# Patient Record
Sex: MALE | Race: WHITE | Employment: UNEMPLOYED | URBAN - METROPOLITAN AREA
[De-identification: names, ages, dates, MRNs, and addresses within clinical notes are randomized per-mention and may not be internally consistent; named-entity substitution may affect disease eponyms.]

---

## 2018-09-07 ENCOUNTER — OFFICE VISIT (OUTPATIENT)
Dept: FAMILY MEDICINE CLINIC | Age: 48
End: 2018-09-07

## 2018-09-07 VITALS
BODY MASS INDEX: 28.23 KG/M2 | SYSTOLIC BLOOD PRESSURE: 132 MMHG | WEIGHT: 208.4 LBS | RESPIRATION RATE: 16 BRPM | OXYGEN SATURATION: 98 % | HEIGHT: 72 IN | HEART RATE: 75 BPM | DIASTOLIC BLOOD PRESSURE: 82 MMHG

## 2018-09-07 DIAGNOSIS — F41.9 ANXIETY: ICD-10-CM

## 2018-09-07 DIAGNOSIS — J34.1 MUCOUS RETENTION CYST OF MAXILLARY SINUS: ICD-10-CM

## 2018-09-07 DIAGNOSIS — J32.0 CHRONIC MAXILLARY SINUSITIS: ICD-10-CM

## 2018-09-07 DIAGNOSIS — I10 ESSENTIAL HYPERTENSION: Primary | ICD-10-CM

## 2018-09-07 PROCEDURE — 99204 OFFICE O/P NEW MOD 45 MIN: CPT | Performed by: NURSE PRACTITIONER

## 2018-09-07 RX ORDER — LORAZEPAM 0.5 MG/1
0.5 TABLET ORAL 2 TIMES DAILY PRN
Qty: 30 TABLET | Refills: 0 | Status: SHIPPED | OUTPATIENT
Start: 2018-09-07 | End: 2018-10-03 | Stop reason: SDUPTHER

## 2018-09-07 RX ORDER — FLUTICASONE PROPIONATE 50 MCG
2 SPRAY, SUSPENSION (ML) NASAL DAILY
Qty: 1 BOTTLE | Refills: 3 | Status: SHIPPED | OUTPATIENT
Start: 2018-09-07 | End: 2019-09-09 | Stop reason: ALTCHOICE

## 2018-09-07 RX ORDER — PREDNISONE 10 MG/1
TABLET ORAL
Qty: 20 TABLET | Refills: 0 | Status: SHIPPED | OUTPATIENT
Start: 2018-09-07 | End: 2018-10-03 | Stop reason: ALTCHOICE

## 2018-09-07 RX ORDER — LISINOPRIL 20 MG/1
20 TABLET ORAL DAILY
Qty: 30 TABLET | Refills: 3 | Status: SHIPPED | OUTPATIENT
Start: 2018-09-07 | End: 2019-01-07 | Stop reason: SDUPTHER

## 2018-09-07 RX ORDER — CLARITHROMYCIN 500 MG/1
500 TABLET, COATED ORAL 2 TIMES DAILY
Qty: 28 TABLET | Refills: 0 | Status: SHIPPED | OUTPATIENT
Start: 2018-09-07 | End: 2018-09-21

## 2018-09-07 ASSESSMENT — ENCOUNTER SYMPTOMS
NAUSEA: 0
COUGH: 1
SPUTUM PRODUCTION: 1
SORE THROAT: 1
EYE PAIN: 0
SINUS PAIN: 1
BLURRED VISION: 0
WHEEZING: 0
EYE DISCHARGE: 0
ABDOMINAL PAIN: 0
SHORTNESS OF BREATH: 0
DIARRHEA: 0

## 2018-09-07 ASSESSMENT — PATIENT HEALTH QUESTIONNAIRE - PHQ9
SUM OF ALL RESPONSES TO PHQ QUESTIONS 1-9: 0
SUM OF ALL RESPONSES TO PHQ QUESTIONS 1-9: 0
1. LITTLE INTEREST OR PLEASURE IN DOING THINGS: 0
SUM OF ALL RESPONSES TO PHQ9 QUESTIONS 1 & 2: 0
2. FEELING DOWN, DEPRESSED OR HOPELESS: 0

## 2018-09-07 NOTE — PROGRESS NOTES
Xander Mancilla  56 y.o. male    1970      CC: ER follow up     Chief Complaint   Patient presents with    Follow-Up from [address] PALPITATIONS/ PANIC ATTACKS AND THIS CAUSES HIGH BP. TOLD HIM AT ER COULD NOT GIVE HIM ANYTHING AND NEEDED A PCP. HPI     HAS DEEP ROOTED SINUS ISSUES THAT \"MESSES WITH HIS HEAD\"  Has had 3 anxiety attacks this summer. Had a shot of ativan in the ER and that helped. He was not given anything to go home on except for antibiotics for the sinus infection. Had an attack in June - Had blood pressure medication and vertigo med in June. Then head started same thing, and then got 10 day antibiotic. Still has head pressure - shifted to the side. Thinks has chronic sinus infection. He hates going to the doctor. He was forced to go to the doctor with these episodes. This started with being turned down from physical for BP. That was the start of the first one in June. BP was 170/108 and was failed. Could not get out of his own head. Then had feeling like heart was coming out of his chest.  Takes 2 meds for BP - Has been taking the BP meds and does well other than when has these attacks. Was deer hunting when the last one came on. Was feeling hot and heart pounded and turned air on and that did not help. Then calmed down for a little while and still did not help. Then when he got there - relaxed and felt better. They gave shot of ativan, and calms him quickly. He is a smoker   Has given up pop  Has been limiting coffee to 1 cup daily  Cigarettes - Has been reducing some. Was 1 ppd and quit for 6 years and restart in 2014 and now 1/2 ppd. Would like to quit. Allergies - uses claritin and claritin D. Sudafed. Flonase. Review of Systems   Constitutional: Negative. HENT: Positive for congestion, sinus pain and sore throat. Eyes: Negative for blurred vision, pain and discharge.    Respiratory: reviewed. Vitals:    09/07/18 0936   BP: 132/82   Site: Right Upper Arm   Position: Sitting   Cuff Size: Large Adult   Pulse: 75   Resp: 16   SpO2: 98%   Weight: 208 lb 6.4 oz (94.5 kg)   Height: 6' (1.829 m)     Body mass index is 28.26 kg/m². Wt Readings from Last 3 Encounters:   09/07/18 208 lb 6.4 oz (94.5 kg)   09/02/18 207 lb 14.3 oz (94.3 kg)   06/13/18 211 lb 6.7 oz (95.9 kg)     BP Readings from Last 3 Encounters:   09/07/18 132/82   09/03/18 127/72   08/25/18 127/76        No results found for this visit on 09/07/18. Assessment     Diagnosis Orders   1. Essential hypertension  metoprolol tartrate (LOPRESSOR) 25 MG tablet    lisinopril (PRINIVIL) 20 MG tablet   2. Chronic maxillary sinusitis  fluticasone (FLONASE) 50 MCG/ACT nasal spray    clarithromycin (BIAXIN) 500 MG tablet   3. Anxiety  LORazepam (ATIVAN) 0.5 MG tablet   4. Mucous retention cyst of maxillary sinus           Plan    Refilled metoprolol and lisinopril for blood pressure  Given information on DASH diet  Has chronic maxillary sinusitis with a mucous retention cyst that sparingly large on CT  Given Flonase, Biaxin, and prednisone in an attempt to treat  May need ENT follow-up  Given Ativan for the short run for anxiety - this is acute anxiety  Discussed if becomes more chronic may need a controller medication. Controlled Substances Monitoring:     RX Monitoring 9/7/2018   Attestation The Prescription Monitoring Report for this patient was reviewed today. Documentation No signs of potential drug abuse or diversion identified. Return in about 3 months (around 12/7/2018) for Anxiety, Hypertension. Patient Instructions     Patient Education        DASH Diet: Care Instructions  Your Care Instructions    The DASH diet is an eating plan that can help lower your blood pressure. DASH stands for Dietary Approaches to Stop Hypertension. Hypertension is high blood pressure.   The DASH diet focuses on eating foods that are high in calcium, potassium, and magnesium. These nutrients can lower blood pressure. The foods that are highest in these nutrients are fruits, vegetables, low-fat dairy products, nuts, seeds, and legumes. But taking calcium, potassium, and magnesium supplements instead of eating foods that are high in those nutrients does not have the same effect. The DASH diet also includes whole grains, fish, and poultry. The DASH diet is one of several lifestyle changes your doctor may recommend to lower your high blood pressure. Your doctor may also want you to decrease the amount of sodium in your diet. Lowering sodium while following the DASH diet can lower blood pressure even further than just the DASH diet alone. Follow-up care is a key part of your treatment and safety. Be sure to make and go to all appointments, and call your doctor if you are having problems. It's also a good idea to know your test results and keep a list of the medicines you take. How can you care for yourself at home? Following the DASH diet  · Eat 4 to 5 servings of fruit each day. A serving is 1 medium-sized piece of fruit, ½ cup chopped or canned fruit, 1/4 cup dried fruit, or 4 ounces (½ cup) of fruit juice. Choose fruit more often than fruit juice. · Eat 4 to 5 servings of vegetables each day. A serving is 1 cup of lettuce or raw leafy vegetables, ½ cup of chopped or cooked vegetables, or 4 ounces (½ cup) of vegetable juice. Choose vegetables more often than vegetable juice. · Get 2 to 3 servings of low-fat and fat-free dairy each day. A serving is 8 ounces of milk, 1 cup of yogurt, or 1 ½ ounces of cheese. · Eat 6 to 8 servings of grains each day. A serving is 1 slice of bread, 1 ounce of dry cereal, or ½ cup of cooked rice, pasta, or cooked cereal. Try to choose whole-grain products as much as possible. · Limit lean meat, poultry, and fish to 2 servings each day. A serving is 3 ounces, about the size of a deck of cards.   · Eat 4 to 5 servings about \"DASH Diet: Care Instructions. \"     If you do not have an account, please click on the \"Sign Up Now\" link. Current as of: December 6, 2017  Content Version: 11.7  © 6120-3660 [employer]. Care instructions adapted under license by [facility] ([facility]). If you have questions about a medical condition or this instruction, always ask your healthcare professional. [address] any warranty or liability for your use of this information.

## 2018-10-03 ENCOUNTER — OFFICE VISIT (OUTPATIENT)
Dept: FAMILY MEDICINE CLINIC | Age: 48
End: 2018-10-03

## 2018-10-03 VITALS
DIASTOLIC BLOOD PRESSURE: 88 MMHG | RESPIRATION RATE: 16 BRPM | SYSTOLIC BLOOD PRESSURE: 132 MMHG | HEIGHT: 72 IN | WEIGHT: 212 LBS | BODY MASS INDEX: 28.71 KG/M2 | HEART RATE: 85 BPM | OXYGEN SATURATION: 98 %

## 2018-10-03 DIAGNOSIS — F41.9 ANXIETY: ICD-10-CM

## 2018-10-03 DIAGNOSIS — H65.23 BILATERAL CHRONIC SEROUS OTITIS MEDIA: Primary | ICD-10-CM

## 2018-10-03 DIAGNOSIS — H83.03 ACUTE LABYRINTHITIS, BILATERAL: ICD-10-CM

## 2018-10-03 PROCEDURE — 99214 OFFICE O/P EST MOD 30 MIN: CPT | Performed by: NURSE PRACTITIONER

## 2018-10-03 RX ORDER — PREDNISONE 10 MG/1
TABLET ORAL
Qty: 34 TABLET | Refills: 0 | Status: SHIPPED | OUTPATIENT
Start: 2018-10-03 | End: 2019-02-20 | Stop reason: ALTCHOICE

## 2018-10-03 RX ORDER — MECLIZINE HYDROCHLORIDE 25 MG/1
25 TABLET ORAL 3 TIMES DAILY PRN
Qty: 30 TABLET | Refills: 1 | Status: SHIPPED | OUTPATIENT
Start: 2018-10-03 | End: 2018-10-13

## 2018-10-03 RX ORDER — LORAZEPAM 0.5 MG/1
0.5 TABLET ORAL 2 TIMES DAILY PRN
Qty: 30 TABLET | Refills: 1 | Status: SHIPPED | OUTPATIENT
Start: 2018-10-03 | End: 2018-11-02

## 2018-10-03 NOTE — PROGRESS NOTES
(DELTASONE) 10 MG tablet    meclizine (ANTIVERT) 25 MG tablet    Aleks Mckay MD   3. Anxiety  LORazepam (ATIVAN) 0.5 MG tablet         Plan    High dose pred taper to try and treat  antivert to treat  Epley maneuvers given  Refer to Dr Christy Campbell if not improving. Ativan for anxiety. Refilled    Return if symptoms worsen or fail to improve. There are no Patient Instructions on file for this visit. Controlled Substances Monitoring:     RX Monitoring 10/3/2018   Attestation The Prescription Monitoring Report for this patient was reviewed today. Documentation No signs of potential drug abuse or diversion identified.

## 2019-01-07 DIAGNOSIS — I10 ESSENTIAL HYPERTENSION: ICD-10-CM

## 2019-01-07 RX ORDER — LISINOPRIL 20 MG/1
TABLET ORAL
Qty: 30 TABLET | Refills: 0 | Status: SHIPPED | OUTPATIENT
Start: 2019-01-07 | End: 2019-03-13 | Stop reason: SDUPTHER

## 2019-02-20 ENCOUNTER — OFFICE VISIT (OUTPATIENT)
Dept: FAMILY MEDICINE CLINIC | Age: 49
End: 2019-02-20

## 2019-02-20 VITALS
HEIGHT: 72 IN | SYSTOLIC BLOOD PRESSURE: 132 MMHG | TEMPERATURE: 98.8 F | OXYGEN SATURATION: 98 % | BODY MASS INDEX: 29.42 KG/M2 | HEART RATE: 82 BPM | DIASTOLIC BLOOD PRESSURE: 66 MMHG | WEIGHT: 217.2 LBS

## 2019-02-20 DIAGNOSIS — J32.9 CHRONIC SINUSITIS, UNSPECIFIED LOCATION: Primary | ICD-10-CM

## 2019-02-20 DIAGNOSIS — R42 DIZZINESS: ICD-10-CM

## 2019-02-20 PROCEDURE — 99213 OFFICE O/P EST LOW 20 MIN: CPT | Performed by: FAMILY MEDICINE

## 2019-02-20 RX ORDER — CEFDINIR 300 MG/1
300 CAPSULE ORAL 2 TIMES DAILY
Qty: 20 CAPSULE | Refills: 0 | Status: SHIPPED | OUTPATIENT
Start: 2019-02-20 | End: 2019-03-02

## 2019-02-20 RX ORDER — PREDNISONE 20 MG/1
TABLET ORAL
Qty: 24 TABLET | Refills: 0 | Status: SHIPPED | OUTPATIENT
Start: 2019-02-20 | End: 2019-03-04 | Stop reason: ALTCHOICE

## 2019-02-20 ASSESSMENT — ENCOUNTER SYMPTOMS
EYE REDNESS: 0
COUGH: 0
SINUS PRESSURE: 1
ABDOMINAL PAIN: 0
RHINORRHEA: 0
DIARRHEA: 0
SORE THROAT: 0
SHORTNESS OF BREATH: 0
EYE PAIN: 0
VOMITING: 0
WHEEZING: 0

## 2019-03-04 ENCOUNTER — OFFICE VISIT (OUTPATIENT)
Dept: ENT CLINIC | Age: 49
End: 2019-03-04

## 2019-03-04 VITALS — HEART RATE: 69 BPM | OXYGEN SATURATION: 98 % | SYSTOLIC BLOOD PRESSURE: 132 MMHG | DIASTOLIC BLOOD PRESSURE: 80 MMHG

## 2019-03-04 DIAGNOSIS — R42 DIZZY: Primary | ICD-10-CM

## 2019-03-04 PROCEDURE — 99203 OFFICE O/P NEW LOW 30 MIN: CPT | Performed by: OTOLARYNGOLOGY

## 2019-03-04 ASSESSMENT — ENCOUNTER SYMPTOMS
SINUS PAIN: 0
FACIAL SWELLING: 0
TROUBLE SWALLOWING: 0
RHINORRHEA: 0
ALLERGIC/IMMUNOLOGIC NEGATIVE: 1
SORE THROAT: 0
SINUS PRESSURE: 0
RESPIRATORY NEGATIVE: 1
EYES NEGATIVE: 1
VOICE CHANGE: 0

## 2019-03-13 DIAGNOSIS — I10 ESSENTIAL HYPERTENSION: ICD-10-CM

## 2019-03-13 RX ORDER — LISINOPRIL 20 MG/1
20 TABLET ORAL DAILY
Qty: 30 TABLET | Refills: 2 | Status: SHIPPED | OUTPATIENT
Start: 2019-03-13 | End: 2019-06-10 | Stop reason: DRUGHIGH

## 2019-03-19 ENCOUNTER — PROCEDURE VISIT (OUTPATIENT)
Dept: AUDIOLOGY | Age: 49
End: 2019-03-19

## 2019-03-19 DIAGNOSIS — H83.2X3 VESTIBULAR DYSFUNCTION, BILATERAL: Primary | ICD-10-CM

## 2019-03-19 DIAGNOSIS — H93.13 TINNITUS OF BOTH EARS: Primary | ICD-10-CM

## 2019-03-19 PROCEDURE — 92550 TYMPANOMETRY & REFLEX THRESH: CPT | Performed by: AUDIOLOGIST

## 2019-03-19 PROCEDURE — 92537 CALORIC VSTBLR TEST W/REC: CPT | Performed by: AUDIOLOGIST

## 2019-03-19 PROCEDURE — 92540 BASIC VESTIBULAR EVALUATION: CPT | Performed by: AUDIOLOGIST

## 2019-03-19 PROCEDURE — 92557 COMPREHENSIVE HEARING TEST: CPT | Performed by: AUDIOLOGIST

## 2019-03-22 ENCOUNTER — TELEPHONE (OUTPATIENT)
Dept: ENT CLINIC | Age: 49
End: 2019-03-22

## 2019-03-22 DIAGNOSIS — N28.9 KIDNEY DISEASE: ICD-10-CM

## 2019-03-22 DIAGNOSIS — H90.5 HEARING LOSS, SENSORINEURAL, UNILATERAL: Primary | ICD-10-CM

## 2019-03-25 ENCOUNTER — TELEPHONE (OUTPATIENT)
Dept: FAMILY MEDICINE CLINIC | Age: 49
End: 2019-03-25

## 2019-03-25 DIAGNOSIS — F41.9 ANXIETY: ICD-10-CM

## 2019-03-25 NOTE — TELEPHONE ENCOUNTER
LAST VISIT 10/03/2018 WITH JOSEFINA, NEXT VISIT TOMORROW WITH DR Jodee Rojas. DR ROPER IS OUT OF THE OFFICE. OARRS IN MEDIA.   [facility]

## 2019-03-25 NOTE — TELEPHONE ENCOUNTER
Pt is calling to get a refill on his Lorazapam .5 mg he had #30. Myna Pellet is who filled it at that time but he now sees Dr Malena Corado. Pt is out of his meds.

## 2019-03-26 ENCOUNTER — OFFICE VISIT (OUTPATIENT)
Dept: FAMILY MEDICINE CLINIC | Age: 49
End: 2019-03-26

## 2019-03-26 VITALS
HEIGHT: 72 IN | RESPIRATION RATE: 20 BRPM | HEART RATE: 95 BPM | OXYGEN SATURATION: 97 % | WEIGHT: 224.2 LBS | DIASTOLIC BLOOD PRESSURE: 84 MMHG | SYSTOLIC BLOOD PRESSURE: 146 MMHG | BODY MASS INDEX: 30.37 KG/M2

## 2019-03-26 DIAGNOSIS — Z72.0 TOBACCO ABUSE: ICD-10-CM

## 2019-03-26 DIAGNOSIS — F41.9 ANXIETY: Primary | ICD-10-CM

## 2019-03-26 DIAGNOSIS — I10 ESSENTIAL HYPERTENSION: Chronic | ICD-10-CM

## 2019-03-26 LAB
AMPHETAMINE SCREEN, URINE: NEGATIVE
BARBITURATE SCREEN, URINE: NEGATIVE
BENZODIAZEPINE SCREEN, URINE: NEGATIVE
BUPRENORPHINE URINE: NEGATIVE
COCAINE METABOLITE SCREEN URINE: NEGATIVE
GABAPENTIN SCREEN, URINE: NEGATIVE
MDMA URINE: NEGATIVE
METHADONE SCREEN, URINE: NEGATIVE
METHAMPHETAMINE, URINE: NEGATIVE
OPIATE SCREEN URINE: NEGATIVE
OXYCODONE SCREEN URINE: NEGATIVE
PHENCYCLIDINE SCREEN URINE: NEGATIVE
PROPOXYPHENE SCREEN, URINE: NEGATIVE
THC SCREEN, URINE: NEGATIVE
TRICYCLIC ANTIDEPRESSANTS, UR: NEGATIVE

## 2019-03-26 PROCEDURE — 99212 OFFICE O/P EST SF 10 MIN: CPT | Performed by: FAMILY MEDICINE

## 2019-03-26 PROCEDURE — 80305 DRUG TEST PRSMV DIR OPT OBS: CPT | Performed by: FAMILY MEDICINE

## 2019-03-26 RX ORDER — LORAZEPAM 0.5 MG/1
0.5 TABLET ORAL 2 TIMES DAILY PRN
Qty: 30 TABLET | Refills: 0 | OUTPATIENT
Start: 2019-03-26 | End: 2019-04-25

## 2019-03-26 RX ORDER — LORAZEPAM 0.5 MG/1
0.5 TABLET ORAL 2 TIMES DAILY PRN
COMMUNITY
End: 2019-03-26 | Stop reason: SDUPTHER

## 2019-03-26 RX ORDER — LORAZEPAM 0.5 MG/1
0.5 TABLET ORAL 2 TIMES DAILY PRN
Qty: 30 TABLET | Refills: 2 | Status: SHIPPED | OUTPATIENT
Start: 2019-03-26 | End: 2019-06-23 | Stop reason: SDUPTHER

## 2019-03-26 RX ORDER — CITALOPRAM 10 MG/1
10 TABLET ORAL DAILY
Qty: 90 TABLET | Refills: 1 | Status: SHIPPED | OUTPATIENT
Start: 2019-03-26 | End: 2019-09-09 | Stop reason: ALTCHOICE

## 2019-03-26 SDOH — ECONOMIC STABILITY: TRANSPORTATION INSECURITY
IN THE PAST 12 MONTHS, HAS LACK OF TRANSPORTATION KEPT YOU FROM MEETINGS, WORK, OR FROM GETTING THINGS NEEDED FOR DAILY LIVING?: NO

## 2019-03-26 SDOH — ECONOMIC STABILITY: TRANSPORTATION INSECURITY
IN THE PAST 12 MONTHS, HAS THE LACK OF TRANSPORTATION KEPT YOU FROM MEDICAL APPOINTMENTS OR FROM GETTING MEDICATIONS?: NO

## 2019-03-26 ASSESSMENT — PATIENT HEALTH QUESTIONNAIRE - PHQ9
1. LITTLE INTEREST OR PLEASURE IN DOING THINGS: 0
SUM OF ALL RESPONSES TO PHQ9 QUESTIONS 1 & 2: 0
2. FEELING DOWN, DEPRESSED OR HOPELESS: 0
SUM OF ALL RESPONSES TO PHQ QUESTIONS 1-9: 0
SUM OF ALL RESPONSES TO PHQ QUESTIONS 1-9: 0

## 2019-03-26 NOTE — PATIENT INSTRUCTIONS
Dante Maddox was seen today for anxiety. Diagnoses and all orders for this visit:    Anxiety  -     POCT Rapid Drug Screen  -     LORazepam (ATIVAN) 0.5 MG tablet; Take 1 tablet by mouth 2 times daily as needed for Anxiety for up to 90 days. -     citalopram (CELEXA) 10 MG tablet; Take 1 tablet by mouth daily  Ask mother if having pauses in breathing. Essential hypertension  -     Good control.  -     Continue meds and lifestyle control.      Tobacco abuse  Taper     Light headed

## 2019-03-26 NOTE — PROGRESS NOTES
Subjective:      Patient ID: Rose Hummel is a 53 y.o. male. Chief Complaint   Patient presents with    Anxiety     PT HERE FOR ROUTINE FOLLOW UP ON ANXIETY AND NEEDS REFILLS ON ATIVAN. LAST DOSE OF ATIVAN WAS 3/25/19. PT FEELS LIKE THE DOSE OF MEDICATION IS NOT WORKING FOR HIM, AND HE WANTS TO TALK ABOUT POSSIBLE DOSAGE CHANGE. OARRS IN MEDIA, [facility] AND UDS DUE TODAY   224  HPI     Anxiety  Thinks due to light headedness. Feels something in his head. Started in 6/2018. Had anxiety attack and given vertigo med. Had 2 2 large cyst in frontal sinus cavities. Had 3 tx with antibiotics x 3 and steroids x 2(back to back). Was fine mid Oct to Dec. Then seen in Feb. Seen by ENT. Tried a tea. He does snore. HTN  BP up at times. He is not taking his BP though has a home cuff. Is not adding salt. Is not eating salty snacks. TOBACCO ABUSE  1 PPD. 3 rx of #30 (9/7/18)    Current Outpatient Medications   Medication Sig Dispense Refill    LORazepam (ATIVAN) 0.5 MG tablet Take 0.5 mg by mouth 2 times daily as needed for Anxiety.  lisinopril (PRINIVIL;ZESTRIL) 20 MG tablet Take 1 tablet by mouth daily 30 tablet 2    aspirin 81 MG tablet Take 81 mg by mouth daily      metoprolol tartrate (LOPRESSOR) 25 MG tablet TAKE 1 TABLET BY MOUTH TWICE DAILY 180 tablet 0    fluticasone (FLONASE) 50 MCG/ACT nasal spray 2 sprays by Each Nare route daily 1 Bottle 3     No current facility-administered medications for this visit. Review of Systems    Objective:   Physical Exam   Constitutional: He appears well-developed. No distress. Eyes: Conjunctivae are normal. Right eye exhibits no discharge. Left eye exhibits no discharge. No scleral icterus. Neck: Trachea normal and phonation normal. Neck supple. No JVD present. No tracheal tenderness present. Carotid bruit is not present. No tracheal deviation present. No thyroid mass and no thyromegaly present.    Cardiovascular: Normal rate, regular rhythm, S1 normal, S2

## 2019-04-03 ENCOUNTER — TELEPHONE (OUTPATIENT)
Dept: ENT CLINIC | Age: 49
End: 2019-04-03

## 2019-04-03 ENCOUNTER — HOSPITAL ENCOUNTER (OUTPATIENT)
Dept: MRI IMAGING | Age: 49
Discharge: HOME | End: 2019-04-03
Payer: MEDICARE

## 2019-04-03 DIAGNOSIS — H90.5 HEARING LOSS, SENSORINEURAL, UNILATERAL: ICD-10-CM

## 2019-04-03 DIAGNOSIS — H65.01 RIGHT ACUTE SEROUS OTITIS MEDIA, RECURRENCE NOT SPECIFIED: Primary | ICD-10-CM

## 2019-04-03 LAB
ALBUMIN SERPL-MCNC: 4.8 G/DL (ref 3.4–5)
ANION GAP SERPL CALCULATED.3IONS-SCNC: 12 MMOL/L (ref 3–16)
BUN BLDV-MCNC: 16 MG/DL (ref 7–20)
CALCIUM SERPL-MCNC: 9.6 MG/DL (ref 8.3–10.6)
CHLORIDE BLD-SCNC: 104 MMOL/L (ref 99–110)
CO2: 24 MMOL/L (ref 21–32)
CREAT SERPL-MCNC: 0.9 MG/DL (ref 0.9–1.3)
GFR AFRICAN AMERICAN: >60
GFR NON-AFRICAN AMERICAN: >60
GLUCOSE BLD-MCNC: 128 MG/DL (ref 70–99)
PHOSPHORUS: 2.8 MG/DL (ref 2.5–4.9)
POTASSIUM SERPL-SCNC: 4.2 MMOL/L (ref 3.5–5.1)
SODIUM BLD-SCNC: 140 MMOL/L (ref 136–145)

## 2019-04-03 PROCEDURE — 36415 COLL VENOUS BLD VENIPUNCTURE: CPT

## 2019-04-03 PROCEDURE — A9579 GAD-BASE MR CONTRAST NOS,1ML: HCPCS | Performed by: OTOLARYNGOLOGY

## 2019-04-03 PROCEDURE — 70553 MRI BRAIN STEM W/O & W/DYE: CPT

## 2019-04-03 PROCEDURE — 6360000004 HC RX CONTRAST MEDICATION: Performed by: OTOLARYNGOLOGY

## 2019-04-03 PROCEDURE — 2580000003 HC RX 258: Performed by: OTOLARYNGOLOGY

## 2019-04-03 PROCEDURE — 80069 RENAL FUNCTION PANEL: CPT

## 2019-04-03 RX ORDER — SODIUM CHLORIDE 0.9 % (FLUSH) 0.9 %
10 SYRINGE (ML) INJECTION ONCE
Status: COMPLETED | OUTPATIENT
Start: 2019-04-03 | End: 2019-04-03

## 2019-04-03 RX ORDER — PREDNISONE 10 MG/1
TABLET ORAL
Qty: 25 TABLET | Refills: 0 | Status: SHIPPED | OUTPATIENT
Start: 2019-04-03 | End: 2019-06-10

## 2019-04-03 RX ADMIN — Medication 10 ML: at 09:48

## 2019-04-03 RX ADMIN — GADOTERIDOL 10 MMOL: 279.3 INJECTION, SOLUTION INTRAVENOUS at 09:45

## 2019-04-03 NOTE — TELEPHONE ENCOUNTER
MRI scan is negative for eighth nerve tumor but there does appear to be some fluid in the mastoid possibly related to some serous otitis.   Will start him on some medication and reevaluate him in the office

## 2019-04-08 ENCOUNTER — TELEPHONE (OUTPATIENT)
Dept: ENT CLINIC | Age: 49
End: 2019-04-08

## 2019-04-08 NOTE — TELEPHONE ENCOUNTER
Pt had MRI, he got an rx for prednisone, he says the rx directions doesn't make any sense, he will be on them for 21 days , is that correct?

## 2019-04-16 DIAGNOSIS — F41.9 ANXIETY: ICD-10-CM

## 2019-04-16 RX ORDER — LORAZEPAM 0.5 MG/1
TABLET ORAL
Qty: 30 TABLET | OUTPATIENT
Start: 2019-04-16

## 2019-04-16 NOTE — TELEPHONE ENCOUNTER
LAST VISIT 03/26/2019 WITH DR Raghav Longoria, NEXT VISIT NONE. OARRS IN MEDIA.  [address] [facility]

## 2019-04-17 ENCOUNTER — TELEPHONE (OUTPATIENT)
Dept: ENT CLINIC | Age: 49
End: 2019-04-17

## 2019-04-17 NOTE — TELEPHONE ENCOUNTER
Patient called, today is the last day of taking  His  medication , and he says that he is slightly better, but his dizziness is still there?  Please advise

## 2019-04-24 ENCOUNTER — OFFICE VISIT (OUTPATIENT)
Dept: ENT CLINIC | Age: 49
End: 2019-04-24
Payer: MEDICARE

## 2019-04-24 VITALS — DIASTOLIC BLOOD PRESSURE: 70 MMHG | OXYGEN SATURATION: 99 % | SYSTOLIC BLOOD PRESSURE: 122 MMHG | HEART RATE: 91 BPM

## 2019-04-24 DIAGNOSIS — H81.02 MENIERE'S DISEASE OF LEFT EAR: Primary | ICD-10-CM

## 2019-04-24 PROCEDURE — 4004F PT TOBACCO SCREEN RCVD TLK: CPT | Performed by: OTOLARYNGOLOGY

## 2019-04-24 PROCEDURE — 99213 OFFICE O/P EST LOW 20 MIN: CPT | Performed by: OTOLARYNGOLOGY

## 2019-04-24 PROCEDURE — G8427 DOCREV CUR MEDS BY ELIG CLIN: HCPCS | Performed by: OTOLARYNGOLOGY

## 2019-04-24 PROCEDURE — G8417 CALC BMI ABV UP PARAM F/U: HCPCS | Performed by: OTOLARYNGOLOGY

## 2019-04-24 RX ORDER — TRIAMTERENE AND HYDROCHLOROTHIAZIDE 37.5; 25 MG/1; MG/1
1 CAPSULE ORAL EVERY MORNING
Qty: 30 CAPSULE | Refills: 1 | Status: SHIPPED | OUTPATIENT
Start: 2019-04-24 | End: 2019-06-25 | Stop reason: SDUPTHER

## 2019-04-24 RX ORDER — PREDNISONE 10 MG/1
TABLET ORAL
Qty: 25 TABLET | Refills: 0 | Status: SHIPPED | OUTPATIENT
Start: 2019-04-24 | End: 2019-06-10

## 2019-05-02 ENCOUNTER — TELEPHONE (OUTPATIENT)
Dept: ENT CLINIC | Age: 49
End: 2019-05-02

## 2019-05-02 DIAGNOSIS — J32.9 RECURRENT SINUSITIS: Primary | ICD-10-CM

## 2019-05-02 RX ORDER — DOXYCYCLINE 100 MG/1
100 CAPSULE ORAL 2 TIMES DAILY
Qty: 20 CAPSULE | Refills: 0 | Status: SHIPPED | OUTPATIENT
Start: 2019-05-02 | End: 2019-06-10

## 2019-05-02 NOTE — TELEPHONE ENCOUNTER
Pt called with update, is doing better but he can still feel like he has the infection, has improved some.    Is still taking meds you gave him

## 2019-05-02 NOTE — TELEPHONE ENCOUNTER
[phone] ([place])   Call to PT, new medication at pharmacy. PT verbalized understanding of new med, will cont.  Current meds and will f/u should meds be ineffective

## 2019-05-12 DIAGNOSIS — I10 ESSENTIAL HYPERTENSION: ICD-10-CM

## 2019-06-10 ENCOUNTER — TELEPHONE (OUTPATIENT)
Dept: FAMILY MEDICINE CLINIC | Age: 49
End: 2019-06-10

## 2019-06-10 ENCOUNTER — OFFICE VISIT (OUTPATIENT)
Dept: FAMILY MEDICINE CLINIC | Age: 49
End: 2019-06-10
Payer: MEDICARE

## 2019-06-10 ENCOUNTER — HOSPITAL ENCOUNTER (INPATIENT)
Age: 49
LOS: 3 days | Discharge: HOME | DRG: 241 | End: 2019-06-13
Attending: INTERNAL MEDICINE | Admitting: INTERNAL MEDICINE
Payer: MEDICARE

## 2019-06-10 VITALS
BODY MASS INDEX: 29.12 KG/M2 | HEART RATE: 100 BPM | DIASTOLIC BLOOD PRESSURE: 58 MMHG | SYSTOLIC BLOOD PRESSURE: 94 MMHG | RESPIRATION RATE: 16 BRPM | HEIGHT: 72 IN | OXYGEN SATURATION: 97 % | WEIGHT: 215 LBS

## 2019-06-10 DIAGNOSIS — N17.9 AKI (ACUTE KIDNEY INJURY) (HCC): ICD-10-CM

## 2019-06-10 DIAGNOSIS — K27.9 PEPTIC ULCER DISEASE: Primary | ICD-10-CM

## 2019-06-10 DIAGNOSIS — R53.83 FATIGUE, UNSPECIFIED TYPE: ICD-10-CM

## 2019-06-10 DIAGNOSIS — K92.2 UPPER GI BLEED: Primary | ICD-10-CM

## 2019-06-10 DIAGNOSIS — D64.9 ANEMIA, UNSPECIFIED TYPE: ICD-10-CM

## 2019-06-10 DIAGNOSIS — K92.1 MELENA: ICD-10-CM

## 2019-06-10 DIAGNOSIS — I10 ESSENTIAL HYPERTENSION: ICD-10-CM

## 2019-06-10 LAB
A/G RATIO: 1.6 (ref 1.1–2.2)
ABO/RH: NORMAL
ALBUMIN SERPL-MCNC: 3.9 G/DL (ref 3.4–5)
ALP BLD-CCNC: 62 U/L (ref 40–129)
ALT SERPL-CCNC: 17 U/L (ref 10–40)
ANION GAP SERPL CALCULATED.3IONS-SCNC: 13 MMOL/L (ref 3–16)
ANTIBODY SCREEN: NORMAL
AST SERPL-CCNC: 12 U/L (ref 15–37)
BASOPHILS ABSOLUTE: 0.1 K/UL (ref 0–0.2)
BASOPHILS ABSOLUTE: 0.1 K/UL (ref 0–0.2)
BASOPHILS RELATIVE PERCENT: 0.4 %
BASOPHILS RELATIVE PERCENT: 0.6 %
BILIRUB SERPL-MCNC: <0.2 MG/DL (ref 0–1)
BUN BLDV-MCNC: 31 MG/DL (ref 7–20)
CALCIUM SERPL-MCNC: 8.6 MG/DL (ref 8.3–10.6)
CHLORIDE BLD-SCNC: 99 MMOL/L (ref 99–110)
CO2: 24 MMOL/L (ref 21–32)
CREAT SERPL-MCNC: 2.1 MG/DL (ref 0.9–1.3)
EOSINOPHILS ABSOLUTE: 0.2 K/UL (ref 0–0.6)
EOSINOPHILS ABSOLUTE: 0.2 K/UL (ref 0–0.6)
EOSINOPHILS RELATIVE PERCENT: 1.2 %
EOSINOPHILS RELATIVE PERCENT: 1.3 %
FERRITIN: 10.4 NG/ML (ref 30–400)
GFR AFRICAN AMERICAN: 41
GFR NON-AFRICAN AMERICAN: 34
GLOBULIN: 2.4 G/DL
GLUCOSE BLD-MCNC: 121 MG/DL (ref 70–99)
HCT VFR BLD CALC: 18.7 % (ref 40.5–52.5)
HCT VFR BLD CALC: 21.4 % (ref 40.5–52.5)
HEMOGLOBIN: 6 G/DL (ref 13.5–17.5)
HEMOGLOBIN: 6.8 G/DL (ref 13.5–17.5)
IRON SATURATION: 9 % (ref 20–50)
IRON: 32 UG/DL (ref 59–158)
LYMPHOCYTES ABSOLUTE: 2.4 K/UL (ref 1–5.1)
LYMPHOCYTES ABSOLUTE: 3.3 K/UL (ref 1–5.1)
LYMPHOCYTES RELATIVE PERCENT: 15.3 %
LYMPHOCYTES RELATIVE PERCENT: 18.7 %
MCH RBC QN AUTO: 26.6 PG (ref 26–34)
MCH RBC QN AUTO: 26.6 PG (ref 26–34)
MCHC RBC AUTO-ENTMCNC: 31.8 G/DL (ref 31–36)
MCHC RBC AUTO-ENTMCNC: 31.9 G/DL (ref 31–36)
MCV RBC AUTO: 83.5 FL (ref 80–100)
MCV RBC AUTO: 83.7 FL (ref 80–100)
MONOCYTES ABSOLUTE: 0.8 K/UL (ref 0–1.3)
MONOCYTES ABSOLUTE: 1 K/UL (ref 0–1.3)
MONOCYTES RELATIVE PERCENT: 5.4 %
MONOCYTES RELATIVE PERCENT: 5.8 %
NEUTROPHILS ABSOLUTE: 12 K/UL (ref 1.7–7.7)
NEUTROPHILS ABSOLUTE: 13.2 K/UL (ref 1.7–7.7)
NEUTROPHILS RELATIVE PERCENT: 73.9 %
NEUTROPHILS RELATIVE PERCENT: 77.4 %
OCCULT BLOOD DIAGNOSTIC: ABNORMAL
PDW BLD-RTO: 16.4 % (ref 12.4–15.4)
PDW BLD-RTO: 16.5 % (ref 12.4–15.4)
PLATELET # BLD: 411 K/UL (ref 135–450)
PLATELET # BLD: 428 K/UL (ref 135–450)
PMV BLD AUTO: 8.4 FL (ref 5–10.5)
PMV BLD AUTO: 8.9 FL (ref 5–10.5)
POTASSIUM SERPL-SCNC: 4 MMOL/L (ref 3.5–5.1)
RBC # BLD: 2.25 M/UL (ref 4.2–5.9)
RBC # BLD: 2.56 M/UL (ref 4.2–5.9)
SODIUM BLD-SCNC: 136 MMOL/L (ref 136–145)
TOTAL IRON BINDING CAPACITY: 337 UG/DL (ref 260–445)
TOTAL PROTEIN: 6.3 G/DL (ref 6.4–8.2)
WBC # BLD: 15.4 K/UL (ref 4–11)
WBC # BLD: 17.8 K/UL (ref 4–11)

## 2019-06-10 PROCEDURE — 80053 COMPREHEN METABOLIC PANEL: CPT

## 2019-06-10 PROCEDURE — 86901 BLOOD TYPING SEROLOGIC RH(D): CPT

## 2019-06-10 PROCEDURE — 4004F PT TOBACCO SCREEN RCVD TLK: CPT | Performed by: FAMILY MEDICINE

## 2019-06-10 PROCEDURE — 85025 COMPLETE CBC W/AUTO DIFF WBC: CPT

## 2019-06-10 PROCEDURE — 36415 COLL VENOUS BLD VENIPUNCTURE: CPT | Performed by: FAMILY MEDICINE

## 2019-06-10 PROCEDURE — 86900 BLOOD TYPING SEROLOGIC ABO: CPT

## 2019-06-10 PROCEDURE — 99285 EMERGENCY DEPT VISIT HI MDM: CPT

## 2019-06-10 PROCEDURE — G8417 CALC BMI ABV UP PARAM F/U: HCPCS | Performed by: FAMILY MEDICINE

## 2019-06-10 PROCEDURE — 99214 OFFICE O/P EST MOD 30 MIN: CPT | Performed by: FAMILY MEDICINE

## 2019-06-10 PROCEDURE — 2060000000 HC ICU INTERMEDIATE R&B

## 2019-06-10 PROCEDURE — 36430 TRANSFUSION BLD/BLD COMPNT: CPT

## 2019-06-10 PROCEDURE — 86923 COMPATIBILITY TEST ELECTRIC: CPT

## 2019-06-10 PROCEDURE — G8427 DOCREV CUR MEDS BY ELIG CLIN: HCPCS | Performed by: FAMILY MEDICINE

## 2019-06-10 PROCEDURE — G0328 FECAL BLOOD SCRN IMMUNOASSAY: HCPCS

## 2019-06-10 PROCEDURE — 86850 RBC ANTIBODY SCREEN: CPT

## 2019-06-10 RX ORDER — LISINOPRIL 20 MG/1
10 TABLET ORAL DAILY
Qty: 30 TABLET | Refills: 2 | Status: SHIPPED | OUTPATIENT
Start: 2019-06-10 | End: 2019-10-30 | Stop reason: SDUPTHER

## 2019-06-10 RX ORDER — SUCRALFATE ORAL 1 G/10ML
1 SUSPENSION ORAL 4 TIMES DAILY
Qty: 1200 ML | Refills: 0 | Status: SHIPPED | OUTPATIENT
Start: 2019-06-10 | End: 2019-06-10

## 2019-06-10 RX ORDER — 0.9 % SODIUM CHLORIDE 0.9 %
1000 INTRAVENOUS SOLUTION INTRAVENOUS ONCE
Status: COMPLETED | OUTPATIENT
Start: 2019-06-10 | End: 2019-06-11

## 2019-06-10 RX ORDER — LISINOPRIL 20 MG/1
10 TABLET ORAL DAILY
Qty: 30 TABLET | Refills: 2 | Status: SHIPPED
Start: 2019-06-10 | End: 2019-06-10 | Stop reason: SDUPTHER

## 2019-06-10 RX ORDER — 0.9 % SODIUM CHLORIDE 0.9 %
250 INTRAVENOUS SOLUTION INTRAVENOUS ONCE
Status: COMPLETED | OUTPATIENT
Start: 2019-06-10 | End: 2019-06-11

## 2019-06-10 RX ORDER — OMEPRAZOLE 40 MG/1
40 CAPSULE, DELAYED RELEASE ORAL
Qty: 30 CAPSULE | Refills: 0 | Status: ON HOLD | OUTPATIENT
Start: 2019-06-10 | End: 2019-06-13 | Stop reason: HOSPADM

## 2019-06-10 RX ORDER — SUCRALFATE 1 G/1
1 TABLET ORAL 4 TIMES DAILY
Qty: 120 TABLET | Refills: 3 | Status: SHIPPED | OUTPATIENT
Start: 2019-06-10 | End: 2019-09-20

## 2019-06-10 ASSESSMENT — ENCOUNTER SYMPTOMS
ABDOMINAL PAIN: 1
WHEEZING: 0
NAUSEA: 1
VOMITING: 0
BLOOD IN STOOL: 1
COUGH: 0
SHORTNESS OF BREATH: 0
COLOR CHANGE: 1

## 2019-06-10 ASSESSMENT — PAIN SCALES - GENERAL
PAINLEVEL_OUTOF10: 0
PAINLEVEL_OUTOF10: 0

## 2019-06-10 NOTE — PATIENT INSTRUCTIONS
Patient Education        Peptic Ulcer Disease: Care Instructions  Your Care Instructions    Peptic ulcers are sores on the inside of the stomach or the small intestine (such as a duodenal ulcer). They are most often caused by an infection with bacteria or from use of nonsteroidal anti-inflammatory drugs (NSAIDs). NSAIDs include aspirin, ibuprofen (Advil), and naproxen (Aleve). Your doctor may have prescribed medicine to reduce stomach acid. You also may need to take antibiotics if your peptic ulcers are caused by an infection. You can help yourself heal and keep ulcers from coming back by making some changes in your lifestyle. Quit smoking. Limit alcohol. Follow-up care is a key part of your treatment and safety. Be sure to make and go to all appointments, and call your doctor if you are having problems. It's also a good idea to know your test results and keep a list of the medicines you take. How can you care for yourself at home? · Be safe with medicines. Take your medicines exactly as prescribed. Call your doctor if you think you are having a problem with your medicine. · Do not take aspirin or other NSAIDs such as ibuprofen (Advil or Motrin) or naproxen (Aleve). Ask your doctor what you can take for pain. · Do not smoke. Smoking can make ulcers worse. If you need help quitting, talk to your doctor about stop-smoking programs and medicines. These can increase your chances of quitting for good. · Drink in moderation or avoid drinking alcohol. · Eat a balanced diet of small, frequent meals. See a dietitian if you need help planning your meals. When should you call for help? [id number] anytime you think you may need emergency care.  For example, call if:    · You vomit blood or what looks like coffee grounds.     · You pass maroon or very bloody stools.    Call your doctor now or seek immediate medical care if:    · You have new or worse belly pain.     · Your stools are black and look like tar, or they have yourself at home? · Be safe with medicines. Take your medicines exactly as prescribed. Call your doctor if you think you are having a problem with your medicine. You will get more details on the specific medicines your doctor prescribes. · Do not take aspirin or other anti-inflammatory medicines, such as naproxen (Aleve) or ibuprofen (Advil, Motrin), without talking to your doctor first. Ask your doctor if it is okay to use acetaminophen (Tylenol). · Do not drink alcohol. · The bleeding may make you lose iron. So it's important to eat foods that have a lot of iron. These include red meat, shellfish, poultry, and eggs. They also include beans, raisins, whole-grain breads, and leafy green vegetables. If you want help planning meals, you can meet with a dietitian. When should you call for help? Call 911 anytime you think you may need emergency care. For example, call if:    · You have sudden, severe belly pain.     · You vomit blood or what looks like coffee grounds.     · You passed out (lost consciousness).     · Your stools are maroon or very bloody.    Call your doctor now or seek immediate medical care if:    · You are dizzy or lightheaded, or you feel like you may faint.     · Your stools are black and look like tar.     · You have belly pain.     · You vomit or have nausea.     · You have trouble swallowing, or it hurts when you swallow.    Watch closely for changes in your health, and be sure to contact your doctor if you do not get better as expected. Where can you learn more? Go to https://[email or web address].Teamly. org and sign in to your [employer] account. Enter G682 in the WineNice box to learn more about \"Upper Gastrointestinal Bleeding: Care Instructions. \"     If you do not have an account, please click on the \"Sign Up Now\" link. Current as of: September 23, 2018  Content Version: 12.0  © 4264-0091 [employer], Incorporated. Care instructions adapted under license by [facility] ([facility]).

## 2019-06-10 NOTE — TELEPHONE ENCOUNTER
Pt is calling back to day his ins will not cover his Sucralfate 10 MLS  ? It will cost him 1200.00. There is a pill form that pt can afford known as Carafate. Can we change this? He is also having problem getting his Ativan 0.05 mg. Filled. Pt only has one left. He can take up to two a day.     Julio Cesar Fitzgerald

## 2019-06-10 NOTE — TELEPHONE ENCOUNTER
Changed Carafate from liquid to pills  New referral to GI - call to schedule Dr. Andrew Adan [phone]  I have not filled the Ativan for him before, Dr. Chantal Noonan has done the last couple, will forward for review. I am happy to refer him to psychiatry if he is interested in this option as well.

## 2019-06-11 ENCOUNTER — ANESTHESIA (OUTPATIENT)
Dept: ENDOSCOPY | Age: 49
DRG: 241 | End: 2019-06-11
Payer: MEDICARE

## 2019-06-11 ENCOUNTER — ANESTHESIA EVENT (OUTPATIENT)
Dept: ENDOSCOPY | Age: 49
DRG: 241 | End: 2019-06-11
Payer: MEDICARE

## 2019-06-11 VITALS
SYSTOLIC BLOOD PRESSURE: 110 MMHG | DIASTOLIC BLOOD PRESSURE: 61 MMHG | OXYGEN SATURATION: 98 % | RESPIRATION RATE: 16 BRPM

## 2019-06-11 LAB
BASOPHILS ABSOLUTE: 0.1 K/UL (ref 0–0.2)
BASOPHILS RELATIVE PERCENT: 0.6 %
BLOOD BANK DISPENSE STATUS: NORMAL
BLOOD BANK DISPENSE STATUS: NORMAL
BLOOD BANK PRODUCT CODE: NORMAL
BLOOD BANK PRODUCT CODE: NORMAL
BPU ID: NORMAL
BPU ID: NORMAL
DESCRIPTION BLOOD BANK: NORMAL
DESCRIPTION BLOOD BANK: NORMAL
EOSINOPHILS ABSOLUTE: 0.1 K/UL (ref 0–0.6)
EOSINOPHILS RELATIVE PERCENT: 1 %
ESTIMATED AVERAGE GLUCOSE: 88.2 MG/DL
HBA1C MFR BLD: 4.7 %
HCT VFR BLD CALC: 23.2 % (ref 40.5–52.5)
HEMOGLOBIN: 7.5 G/DL (ref 13.5–17.5)
LYMPHOCYTES ABSOLUTE: 2.3 K/UL (ref 1–5.1)
LYMPHOCYTES RELATIVE PERCENT: 17.7 %
MCH RBC QN AUTO: 27.4 PG (ref 26–34)
MCHC RBC AUTO-ENTMCNC: 32.2 G/DL (ref 31–36)
MCV RBC AUTO: 84.9 FL (ref 80–100)
MONOCYTES ABSOLUTE: 0.8 K/UL (ref 0–1.3)
MONOCYTES RELATIVE PERCENT: 5.8 %
NEUTROPHILS ABSOLUTE: 9.9 K/UL (ref 1.7–7.7)
NEUTROPHILS RELATIVE PERCENT: 74.9 %
PDW BLD-RTO: 16 % (ref 12.4–15.4)
PLATELET # BLD: 341 K/UL (ref 135–450)
PMV BLD AUTO: 8.7 FL (ref 5–10.5)
RBC # BLD: 2.74 M/UL (ref 4.2–5.9)
WBC # BLD: 13.2 K/UL (ref 4–11)

## 2019-06-11 PROCEDURE — P9016 RBC LEUKOCYTES REDUCED: HCPCS

## 2019-06-11 PROCEDURE — 94760 N-INVAS EAR/PLS OXIMETRY 1: CPT

## 2019-06-11 PROCEDURE — 3609012800 HC EGD DIAGNOSTIC ONLY: Performed by: INTERNAL MEDICINE

## 2019-06-11 PROCEDURE — 2580000003 HC RX 258: Performed by: INTERNAL MEDICINE

## 2019-06-11 PROCEDURE — 6370000000 HC RX 637 (ALT 250 FOR IP): Performed by: INTERNAL MEDICINE

## 2019-06-11 PROCEDURE — 2709999900 HC NON-CHARGEABLE SUPPLY: Performed by: INTERNAL MEDICINE

## 2019-06-11 PROCEDURE — 7100000001 HC PACU RECOVERY - ADDTL 15 MIN: Performed by: INTERNAL MEDICINE

## 2019-06-11 PROCEDURE — 3700000000 HC ANESTHESIA ATTENDED CARE: Performed by: INTERNAL MEDICINE

## 2019-06-11 PROCEDURE — 3700000001 HC ADD 15 MINUTES (ANESTHESIA): Performed by: INTERNAL MEDICINE

## 2019-06-11 PROCEDURE — 6360000002 HC RX W HCPCS: Performed by: INTERNAL MEDICINE

## 2019-06-11 PROCEDURE — 85025 COMPLETE CBC W/AUTO DIFF WBC: CPT

## 2019-06-11 PROCEDURE — 2580000003 HC RX 258: Performed by: NURSE ANESTHETIST, CERTIFIED REGISTERED

## 2019-06-11 PROCEDURE — 6360000002 HC RX W HCPCS: Performed by: PHYSICIAN ASSISTANT

## 2019-06-11 PROCEDURE — 82941 ASSAY OF GASTRIN: CPT

## 2019-06-11 PROCEDURE — 0DJ08ZZ INSPECTION OF UPPER INTESTINAL TRACT, VIA NATURAL OR ARTIFICIAL OPENING ENDOSCOPIC: ICD-10-PCS | Performed by: INTERNAL MEDICINE

## 2019-06-11 PROCEDURE — C9113 INJ PANTOPRAZOLE SODIUM, VIA: HCPCS | Performed by: INTERNAL MEDICINE

## 2019-06-11 PROCEDURE — C9113 INJ PANTOPRAZOLE SODIUM, VIA: HCPCS | Performed by: PHYSICIAN ASSISTANT

## 2019-06-11 PROCEDURE — 1200000000 HC SEMI PRIVATE

## 2019-06-11 PROCEDURE — 2580000003 HC RX 258: Performed by: PHYSICIAN ASSISTANT

## 2019-06-11 PROCEDURE — 7100000000 HC PACU RECOVERY - FIRST 15 MIN: Performed by: INTERNAL MEDICINE

## 2019-06-11 PROCEDURE — 36415 COLL VENOUS BLD VENIPUNCTURE: CPT

## 2019-06-11 PROCEDURE — 2500000003 HC RX 250 WO HCPCS: Performed by: NURSE ANESTHETIST, CERTIFIED REGISTERED

## 2019-06-11 PROCEDURE — 96365 THER/PROPH/DIAG IV INF INIT: CPT

## 2019-06-11 PROCEDURE — 6360000002 HC RX W HCPCS: Performed by: NURSE ANESTHETIST, CERTIFIED REGISTERED

## 2019-06-11 RX ORDER — PROPOFOL 10 MG/ML
INJECTION, EMULSION INTRAVENOUS PRN
Status: DISCONTINUED | OUTPATIENT
Start: 2019-06-11 | End: 2019-06-11 | Stop reason: SDUPTHER

## 2019-06-11 RX ORDER — SODIUM CHLORIDE 0.9 % (FLUSH) 0.9 %
10 SYRINGE (ML) INJECTION EVERY 12 HOURS SCHEDULED
Status: DISCONTINUED | OUTPATIENT
Start: 2019-06-11 | End: 2019-06-13 | Stop reason: HOSPADM

## 2019-06-11 RX ORDER — LIDOCAINE HYDROCHLORIDE 20 MG/ML
INJECTION, SOLUTION EPIDURAL; INFILTRATION; INTRACAUDAL; PERINEURAL PRN
Status: DISCONTINUED | OUTPATIENT
Start: 2019-06-11 | End: 2019-06-11 | Stop reason: SDUPTHER

## 2019-06-11 RX ORDER — SUCRALFATE 1 G/1
1 TABLET ORAL EVERY 6 HOURS SCHEDULED
Status: DISCONTINUED | OUTPATIENT
Start: 2019-06-11 | End: 2019-06-13 | Stop reason: HOSPADM

## 2019-06-11 RX ORDER — SODIUM CHLORIDE 9 MG/ML
INJECTION, SOLUTION INTRAVENOUS CONTINUOUS
Status: DISCONTINUED | OUTPATIENT
Start: 2019-06-11 | End: 2019-06-13 | Stop reason: HOSPADM

## 2019-06-11 RX ORDER — CITALOPRAM 10 MG/1
10 TABLET ORAL DAILY
Status: DISCONTINUED | OUTPATIENT
Start: 2019-06-11 | End: 2019-06-13 | Stop reason: HOSPADM

## 2019-06-11 RX ORDER — SODIUM CHLORIDE 9 MG/ML
INJECTION, SOLUTION INTRAVENOUS CONTINUOUS PRN
Status: DISCONTINUED | OUTPATIENT
Start: 2019-06-11 | End: 2019-06-11 | Stop reason: SDUPTHER

## 2019-06-11 RX ORDER — SODIUM CHLORIDE 0.9 % (FLUSH) 0.9 %
10 SYRINGE (ML) INJECTION PRN
Status: DISCONTINUED | OUTPATIENT
Start: 2019-06-11 | End: 2019-06-13 | Stop reason: HOSPADM

## 2019-06-11 RX ORDER — PROPOFOL 10 MG/ML
INJECTION, EMULSION INTRAVENOUS CONTINUOUS PRN
Status: DISCONTINUED | OUTPATIENT
Start: 2019-06-11 | End: 2019-06-11 | Stop reason: SDUPTHER

## 2019-06-11 RX ORDER — ONDANSETRON 2 MG/ML
4 INJECTION INTRAMUSCULAR; INTRAVENOUS EVERY 6 HOURS PRN
Status: DISCONTINUED | OUTPATIENT
Start: 2019-06-11 | End: 2019-06-13 | Stop reason: HOSPADM

## 2019-06-11 RX ORDER — SUCRALFATE 1 G/1
1 TABLET ORAL 4 TIMES DAILY
Status: DISCONTINUED | OUTPATIENT
Start: 2019-06-11 | End: 2019-06-11

## 2019-06-11 RX ORDER — ACETAMINOPHEN 325 MG/1
650 TABLET ORAL EVERY 4 HOURS PRN
Status: DISCONTINUED | OUTPATIENT
Start: 2019-06-11 | End: 2019-06-13 | Stop reason: HOSPADM

## 2019-06-11 RX ORDER — MIDAZOLAM HYDROCHLORIDE 1 MG/ML
INJECTION INTRAMUSCULAR; INTRAVENOUS PRN
Status: DISCONTINUED | OUTPATIENT
Start: 2019-06-11 | End: 2019-06-11 | Stop reason: SDUPTHER

## 2019-06-11 RX ADMIN — PANTOPRAZOLE SODIUM 8 MG/HR: 40 INJECTION, POWDER, FOR SOLUTION INTRAVENOUS at 11:34

## 2019-06-11 RX ADMIN — PANTOPRAZOLE SODIUM 8 MG/HR: 40 INJECTION, POWDER, FOR SOLUTION INTRAVENOUS at 02:38

## 2019-06-11 RX ADMIN — SODIUM CHLORIDE 1000 ML: 9 INJECTION, SOLUTION INTRAVENOUS at 00:09

## 2019-06-11 RX ADMIN — SODIUM CHLORIDE: 9 INJECTION, SOLUTION INTRAVENOUS at 05:55

## 2019-06-11 RX ADMIN — MIDAZOLAM 2 MG: 1 INJECTION INTRAMUSCULAR; INTRAVENOUS at 13:48

## 2019-06-11 RX ADMIN — LIDOCAINE HYDROCHLORIDE 50 MG: 20 INJECTION, SOLUTION EPIDURAL; INFILTRATION; INTRACAUDAL; PERINEURAL at 13:48

## 2019-06-11 RX ADMIN — SODIUM CHLORIDE 250 ML: 9 INJECTION, SOLUTION INTRAVENOUS at 00:35

## 2019-06-11 RX ADMIN — PROPOFOL 100 MG: 10 INJECTION, EMULSION INTRAVENOUS at 13:45

## 2019-06-11 RX ADMIN — SUCRALFATE 1 G: 1 TABLET ORAL at 09:15

## 2019-06-11 RX ADMIN — PROPOFOL 140 MCG/KG/MIN: 10 INJECTION, EMULSION INTRAVENOUS at 13:44

## 2019-06-11 RX ADMIN — SODIUM CHLORIDE 80 MG: 9 INJECTION, SOLUTION INTRAVENOUS at 00:09

## 2019-06-11 RX ADMIN — SODIUM CHLORIDE: 9 INJECTION, SOLUTION INTRAVENOUS at 15:07

## 2019-06-11 RX ADMIN — SUCRALFATE 1 G: 1 TABLET ORAL at 16:02

## 2019-06-11 RX ADMIN — CITALOPRAM HYDROBROMIDE 10 MG: 10 TABLET ORAL at 09:15

## 2019-06-11 RX ADMIN — PANTOPRAZOLE SODIUM 8 MG/HR: 40 INJECTION, POWDER, FOR SOLUTION INTRAVENOUS at 20:43

## 2019-06-11 RX ADMIN — SODIUM CHLORIDE 8 MG/HR: 9 INJECTION, SOLUTION INTRAVENOUS at 00:48

## 2019-06-11 RX ADMIN — LIDOCAINE HYDROCHLORIDE 80 MG: 20 INJECTION, SOLUTION EPIDURAL; INFILTRATION; INTRACAUDAL; PERINEURAL at 13:45

## 2019-06-11 RX ADMIN — SUCRALFATE 1 G: 1 TABLET ORAL at 19:10

## 2019-06-11 RX ADMIN — SODIUM CHLORIDE: 9 INJECTION, SOLUTION INTRAVENOUS at 13:23

## 2019-06-11 ASSESSMENT — PAIN - FUNCTIONAL ASSESSMENT: PAIN_FUNCTIONAL_ASSESSMENT: 0-10

## 2019-06-11 ASSESSMENT — ENCOUNTER SYMPTOMS
NAUSEA: 0
COLOR CHANGE: 0
VOMITING: 0
ABDOMINAL PAIN: 0
SHORTNESS OF BREATH: 0
BLOOD IN STOOL: 1

## 2019-06-11 ASSESSMENT — PULMONARY FUNCTION TESTS
PIF_VALUE: 0
PIF_VALUE: 1
PIF_VALUE: 0

## 2019-06-11 ASSESSMENT — PAIN SCALES - GENERAL
PAINLEVEL_OUTOF10: 0

## 2019-06-11 ASSESSMENT — PAIN SCALES - WONG BAKER
WONGBAKER_NUMERICALRESPONSE: 0

## 2019-06-11 ASSESSMENT — LIFESTYLE VARIABLES: SMOKING_STATUS: 1

## 2019-06-11 NOTE — PROGRESS NOTES
4 Eyes Skin Assessment     The patient is being assess for  Admission    I agree that 2 RN's have performed a thorough Head to Toe Skin Assessment on the patient. ALL assessment sites listed below have been assessed. Areas assessed by both nurses:   Nadya Magaña  [x]   Head, Face, and Ears   [x]   Shoulders, Back, and Chest  [x]   Arms, Elbows, and Hands   [x]   Coccyx, Sacrum, and IschIum  [x]   Legs, Feet, and Heels        Does the Patient have Skin Breakdown?   No         Adryan Prevention initiated:  No   Wound Care Orders initiated:  No      [facility] nurse consulted for Pressure Injury (Stage 3,4, Unstageable, DTI, NWPT, and Complex wounds), New and Established Ostomies:  No      Nurse 1 eSignature: Electronically signed by Nadya Magaña RN on 6/11/19 at 2:27 AM    **SHARE this note so that the co-signing nurse is able to place an eSignature**    Nurse 2 eSignature: {Esignature:[id number]}

## 2019-06-11 NOTE — CARE COORDINATION
Sw met with patient, introduced self and SW role. Patient lives at home with his mother. He is independent with his care. He has PCP. He is able to afford all medications. He drives, his mother will take him home from hospital. He is not active with any services at home. He expressed no needs at this time.      Ana María Finley, [address] [facility]  [phone]  6/11/19 at 9:43 AM

## 2019-06-11 NOTE — PROGRESS NOTES
Pt returned from EGD. Pt is awake and alert and oriented able to make needs known. Pt is asking to eat. Dr. Bernard gave orders for a diet. Pt aware.

## 2019-06-11 NOTE — CONSULTS
GASTROENTEROLOGY INPATIENT CONSULTATION      IDENTIFYING DATA/REASON FOR CONSULTATION   PATIENT:  Inge Hatch  MRN:  [id number]  ADMIT DATE: 6/10/2019  TIME OF EVALUATION: 6/11/2019 12:02 PM  HOSPITAL STAY:   LOS: 1 day     REASON FOR CONSULTATION:  GI bleed    HISTORY OF PRESENT ILLNESS   Inge Hatch is a 53 y.o. male with a PMH of HTN, peptic ulcer and tobacco use who presented on 6/10/2019 with black tarry stools, fatigue and lightheadedness. He initially saw his PCP yesterday and blood work showed anemia so he was sent to the hospital for further work up/managment. Blood work here showed hgb of 6.0. This past Sept his hgb was 15. Renal panel showed BUN 31, creatinine 2.1. Pt reports he had a ~1 week bout of passing black stools in early May and again this past weekend. He denies abdominal pain, nausea or vomiting. He does suffer from frequent heartburn and takes TUMs as needed. He had a prior EGD and colonoscopy in 2015 that showed gastric and esophageal ulcers and 1 colon polyp. He denies NSAID use. He usually smokes a pack a cigarettes per day but cut back recently. He denies any unintentional weight loss.   No dysphagia           Prior Endoscopic Evaluations:    EGD/colonoscopy 2015    PAST MEDICAL, SURGICAL, FAMILY, and SOCIAL HISTORY     Past Medical History:   Diagnosis Date    Anxiety     Fracture of finger of right hand     And knuckle    History of blood transfusion     new this admission 6/10/19    Hypertension     Peptic ulcer 2015    Tobacco abuse 10/30/1988     Past Surgical History:   Procedure Laterality Date    COLONOSCOPY  03/2015    1 benign polyp    ENDOSCOPY, COLON, DIAGNOSTIC      UPPER GASTROINTESTINAL ENDOSCOPY  3/2915    3 ulcers - peptic and esophageal    WISDOM TOOTH EXTRACTION       Family History   Problem Relation Age of Onset    Other Mother         gall bladder    Heart Attack Father 52        x3-4    Other Brother         congenital calcium deposits n bones    Cancer Maternal Aunt         brain    Cancer Maternal Grandmother     Cancer Maternal Grandfather     Asthma Paternal Grandfather     Other Maternal Aunt         lung issues    Hearing Loss Brother         congenital     Social History     Socioeconomic History    Marital status: Single     Spouse name: None    Number of children: None    Years of education: None    Highest education level: None   Occupational History    Occupation: FACTORY IN PAST   Social Needs    Financial resource strain: None    Food insecurity:     Worry: None     Inability: None    Transportation needs:     Medical: No     Non-medical: No   Tobacco Use    Smoking status: Current Every Day Smoker     Packs/day: 1.00     Years: 30.00     Pack years: 30.00     Types: Cigarettes     Start date: 1/1/1988    Smokeless tobacco: Never Used    Tobacco comment: 1 PPD (QUIT 8020-3173)   Substance and Sexual Activity    Alcohol use: No    Drug use: Yes     Types: Marijuana    Sexual activity: Yes   Lifestyle    Physical activity:     Days per week: None     Minutes per session: None    Stress: None   Relationships    Social connections:     Talks on phone: None     Gets together: None     Attends Restorationist service: None     Active member of club or organization: None     Attends meetings of clubs or organizations: None     Relationship status: None    Intimate partner violence:     Fear of current or ex partner: None     Emotionally abused: None     Physically abused: None     Forced sexual activity: None   Other Topics Concern    None   Social History Narrative    Exercise - too light headed.         MEDICATIONS   SCHEDULED:    citalopram 10 mg Daily   sucralfate 1 g 4x Daily   sodium chloride flush 10 mL 2 times per day     FLUIDS/DRIPS:     sodium chloride 100 mL/hr at 06/11/19 0555    pantoprozole (PROTONIX) infusion 8 mg/hr (06/11/19 1134)     PRNs:   sodium chloride flush 10 mL PRN   acetaminophen 650 mg Q4H PRN   ondansetron 4 mg Q6H PRN     ALLERGIES:  He No Known Allergies    REVIEW OF SYSTEMS   Pertinent ROS noted in HPI    PHYSICAL EXAM   [unfilled]   I/O last 3 completed shifts: In: 511.7 [Blood:511.7]  Out: -       Physical Exam:  General appearance: alert, cooperative, no distress, appears stated age  Eyes: Anicteric  Head: Normocephalic, without obvious abnormality  Lungs: clear to auscultation bilaterally, Normal Effort  Heart: regular rate and rhythm, normal S1 and S2, no murmurs or rubs  Abdomen: soft, non-distended, non-tender. Bowel sounds normal. No masses,  no organomegaly. Extremities: atraumatic, no cyanosis or edema  Skin: warm and dry, no jaundice  Neuro: Grossly intact, A&OX3      LABS AND IMAGING   Laboratory   Recent Labs     06/10/19  1040 06/10/19  2215 06/11/19  0935   WBC 15.4* 17.8* 13.2*   HGB 6.8* 6.0* 7.5*   HCT 21.4* 18.7* 23.2*   MCV 83.7 83.5 84.9    411 341     Recent Labs     06/10/19  2215      K 4.0   CL 99   CO2 24   BUN 31*   CREATININE 2.1*     Recent Labs     06/10/19  2215   AST 12*   ALT 17   BILITOT <0.2   ALKPHOS 62     No results for input(s): LIPASE, AMYLASE in the last 72 hours. No results for input(s): PROTIME, INR in the last 72 hours. Imaging  No orders to display         ASSESSMENT AND RECOMMENDATIONS   53 y.o. male with a PMH of HTN, peptic ulcer and tobacco use who presented on 6/10/2019 with black tarry stools, fatigue and lightheadedness. Found to have a hgb of 6.0 (from 15 in Sept 2018). Prior EGD 2015 with ulcers. No NSAID use.  +tobacco use. No weight loss. IMPRESSION:  1. Acute blood loss anemia with melena  2. Hx of peptic ulcer disease      RECOMMENDATIONS:    Reviewed  Case with Dr. Obdulio Ovalles. Will proceed with EGD today. Keep NPO.   Continue PPI gtt       If you have any questions or need any further information, please feel free to contact our consult team.  Thank you for allowing us to participate in the care of Lowell Celestin Tenzin Bauer.     Neno Birmingham PA-C

## 2019-06-11 NOTE — PROGRESS NOTES
Pt getting upset because he wants to eat. He said \"I cannot sit around here waiting for them to make up their minds. \" nurse explained that even if the doctor decides not to do a scope he still will need to NPO to rest his stomach to prevent acid so if he has an ulcer it can heal. Pt then said \"stress causes acid too and I am stressed because I haven't had anything to eat. \"  Nurse called [facility] office to clarify if they are planning any procedures. Waiting for return call.

## 2019-06-11 NOTE — PROGRESS NOTES
Alina Pardo from [address] returned call and nurse updated her on the confusion of who should be seeing the pt. Explained that Dr. Dima Uriostegui had called earlier and said the pt had been seen by Dr. Sanjay Khalil in the past and that Dr. Dima Uriostegui was going to do some investigating in the chart and call nurse back if needed. This nurse has not heard back from Dr. Dima Uriostegui. Alina Pardo said she would look into it and call nurse back in about 5 minutes. Nurse is waiting on return call from Alina Pardo.

## 2019-06-11 NOTE — ANESTHESIA PRE PROCEDURE
19 0111  Max: 24   Max taken time: 19 0121  BP  Min: 80/64   Min taken time: 06/10/19 1002  Max: 152/71   Max taken time: 19 0207  SpO2  Av.5 %  Min: 97 %   Min taken time: 06/10/19 1002  Max: 100 %   Max taken time: 19 1319  BP Readings from Last 3 Encounters:   19 (!) 142/79   06/10/19 (!) 94/58   19 122/70       BMI  Body mass index is 29.45 kg/m². Estimated body mass index is 29.45 kg/m² as calculated from the following:    Height as of this encounter: 6' (1.829 m). Weight as of this encounter: 217 lb 2.5 oz (98.5 kg).     CBC   Lab Results   Component Value Date    WBC 13.2 2019    RBC 2.74 2019    HGB 7.5 2019    HCT 23.2 2019    MCV 84.9 2019    RDW 16.0 2019     2019     CMP    Lab Results   Component Value Date     06/10/2019    K 4.0 06/10/2019    K 4.2 2018    CL 99 06/10/2019    CO2 24 06/10/2019    BUN 31 06/10/2019    CREATININE 2.1 06/10/2019    GFRAA 41 06/10/2019    AGRATIO 1.6 06/10/2019    LABGLOM 34 06/10/2019    GLUCOSE 121 06/10/2019    PROT 6.3 06/10/2019    CALCIUM 8.6 06/10/2019    BILITOT <0.2 06/10/2019    ALKPHOS 62 06/10/2019    AST 12 06/10/2019    ALT 17 06/10/2019     BMP    Lab Results   Component Value Date     06/10/2019    K 4.0 06/10/2019    K 4.2 2018    CL 99 06/10/2019    CO2 24 06/10/2019    BUN 31 06/10/2019    CREATININE 2.1 06/10/2019    CALCIUM 8.6 06/10/2019    GFRAA 41 06/10/2019    LABGLOM 34 06/10/2019    GLUCOSE 121 06/10/2019     POCGlucose  Recent Labs     06/10/19  2215   GLUCOSE 121*      Coags    Lab Results   Component Value Date    PROTIME 11.1 2015    INR 1.03 2015    APTT 29.4      HCG (If Applicable) No results found for: PREGTESTUR, PREGSERUM, HCG, HCGQUANT   ABGs No results found for: PHART, PO2ART, BRA8LNS, CFI6IVG, BEART, H4QHUDRK   Type & Screen (If Applicable)  No results found for: Marina Dillard BMI: Wt Readings from Last 3 Encounters:       NPO Status:   Date of last liquid consumption: 06/11/19   Time of last liquid consumption: 0900(to take medications)   Date of last solid food consumption: 06/10/19      Time of last solid consumption: 1700       Anesthesia Evaluation  Patient summary reviewed no history of anesthetic complications:   Airway: Mallampati: II  TM distance: >3 FB     Mouth opening: > = 3 FB Dental:          Pulmonary:   (+) wheezes,  current smoker    (-) COPD and asthma                           Cardiovascular:  Exercise tolerance: good (>4 METS),   (+) hypertension:,     (-) past MI and CABG/stent      Rhythm: regular  Rate: normal                    Neuro/Psych:      (-) seizures, TIA and CVA           GI/Hepatic/Renal:   (+) GERD:, PUD,      (-) liver disease, no renal disease and no morbid obesity       Endo/Other:    (+) blood dyscrasia: anemia:., .    (-) diabetes mellitus, hypothyroidism               Abdominal:   (+) obese,         Vascular:     - DVT and PE. Anesthesia Plan      MAC and TIVA     ASA 3       Induction: intravenous. MIPS: Postoperative opioids intended and Prophylactic antiemetics administered. Anesthetic plan and risks discussed with patient. Plan discussed with CRNA. This pre-anesthesia assessment may be used as a history and physical.    DOS STAFF ADDENDUM:    Pt seen and examined, chart reviewed (including anesthesia, drug and allergy history). No interval changes to history and physical examination. Anesthetic plan, risks, benefits, alternatives, and personnel involved discussed with patient. Patient verbalized an understanding and agrees to proceed.       Serina Holter, MD  June 11, 2019  1:29 PM

## 2019-06-11 NOTE — PROGRESS NOTES
Dr. Tan Juárez called to get information on the consult. Updated him on pt's admitting reason. Dr. Tan Juárez was looking at the chart as nurse was talking and said that the pt has been seen by Dr. Lyndsey Martin in the past. Nurse explained that she was not aware of that. Dr. Tan Juárez said he was going to do some more investigating in the chart and call nurse back if needed.

## 2019-06-11 NOTE — ED NOTES
Report called to [facility]. Handoff of pts pain score.       Della Lozada RN  06/11/19 9525

## 2019-06-11 NOTE — PLAN OF CARE
Problem: Falls - Risk of:  Goal: Will remain free from falls  Description  Will remain free from falls  Outcome: Ongoing  Note:   Pt will remain free from falls. Problem: Bowel Function - Altered:  Goal: Bowel elimination is within specified parameters  Description  Bowel elimination is within specified parameters  Outcome: Ongoing  Note:   Pt will notify RN of abnormal BM's. BM appearance will improve.

## 2019-06-11 NOTE — PROGRESS NOTES
Upon initial assessment nurse noted that pt is not on a heart monitor. Nurse did not see any specific orders for monitor or to not have monitor. Since pt is a PCU status pt telemetry monitor placed on pt.  Box #37 verified with CMU, pt is in SR.

## 2019-06-11 NOTE — PROGRESS NOTES
Pt to Rm#[id number] via ED stretcher with all personal belongings. Oriented to room and role as RN. No telemonitor ordered. Pt transfered to bed, alert and oriented x4. Assessment of pt in progress. POC and education initiated per protocol. Call light within reach. Bed in lowest position and wheels locked. Room is free of clutter. Personal belongings within reach. No current complaints at this time. Pt denies pain, SOB, vomiting, nausea, diarrhea. Will continue to monitor.

## 2019-06-11 NOTE — PROGRESS NOTES
Second unit of blood finished infusing. Pt's vitals WNL and shows no sign of transfusion reaction. Pt now receiving NS at 100mL/hr and protonix at 10mL/hr. Lab came by while blood was still infusing round 0530. Lab will return about 0700 to redraw H/H. Will continue to monitor.      Electronically signed by James Gauthier RN on 6/11/2019 at 6:06 AM

## 2019-06-11 NOTE — ANESTHESIA POSTPROCEDURE EVALUATION
Department of Anesthesiology  Postprocedure Note    Patient: Cristóbal Jasso  MRN: [id number]  YOB: 1970  Date of evaluation: 6/11/2019  Time:  3:04 PM     Procedure Summary     Date:  06/11/19 Room / Location:  [place] [address] / [facility] ENDOSCOPY    Anesthesia Start:  1343 Anesthesia Stop:  2566    Procedure:  EGD DIAGNOSTIC ONLY (N/A ) Diagnosis:  (anemia/melana)    Surgeon:  Guillermo Brewer MD Responsible Provider:  Mita Christensen MD    Anesthesia Type:  MAC, TIVA ASA Status:  3          Anesthesia Type: MAC, TIVA    May Phase I: May Score: 10    May Phase II:      Last vitals: Reviewed and per EMR flowsheets.        Anesthesia Post Evaluation    Patient location during evaluation: PACU  Patient participation: complete - patient participated  Level of consciousness: awake and alert  Pain score: 2  Airway patency: patent  Nausea & Vomiting: no nausea and no vomiting  Complications: no  Cardiovascular status: blood pressure returned to baseline  Respiratory status: acceptable  Hydration status: euvolemic

## 2019-06-11 NOTE — ED PROVIDER NOTES
[facility]      Pt Name: Marco Etienne  MRN: [id number]  [place] 1970  Date of evaluation: 6/10/2019  Provider: MARVIN Irwin    This patient was not seen and evaluated by the attending physician Bridget Mary MD.    [address] [place] [address]       Chief Complaint   Patient presents with    Anemia     hgb = 6.8 at MD's office, GI bleed       CRITICAL CARE TIME   Total Critical Care time was 31 minutes, excluding separately reportable procedures. There was a high probability of clinically significant/life threatening deterioration in the patient's condition which required my urgent intervention. HISTORY OF PRESENT ILLNESS  (Location/Symptom, Timing/Onset, Context/Setting, Quality, Duration, Modifying Factors, Severity.)   Marco Etienne is a 53 y.o. male who presents to the emergency department after being instructed to come by his primary care doctor. Patient states that at the beginning of May he completed 2 rounds of steroids for \"infection. \"  After completing this he started having some upper abdominal pain and had dark stools for about a week. This resolved and then Friday he started having black stools again. Today he started to feel very weak and fatigued and lightheaded like he might pass out. He saw his primary care doctor and was told his hemoglobin was 6.8. He has a history of peptic ulcers in 2015. Takes a baby aspirin last month not currently. No other blood thinners. He denies bright red blood stools. He denies abdominal pain or vomiting or fevers currently. He states he really has not had much in appetite or been eating or drinking much until today when he forced himself to eat and drink. Nursing Notes were reviewed and I agree. REVIEW OF SYSTEMS    (2-9 systems for level 4, 10 or more for level 5)     Review of Systems   Constitutional: Positive for fatigue. Negative for fever.    Respiratory: Negative for shortness of breath. Cardiovascular: Negative for chest pain. Gastrointestinal: Positive for blood in stool (black stool). Negative for abdominal pain, nausea and vomiting. Skin: Positive for pallor. Negative for color change and wound. Neurological: Positive for weakness. Psychiatric/Behavioral: Negative for agitation, behavioral problems and confusion. Except as noted above the remainder of the review of systems was reviewed and negative. PAST MEDICAL HISTORY         Diagnosis Date    Anxiety     Fracture of finger of right hand     And knuckle    Hypertension     Peptic ulcer 2015    Tobacco abuse 10/30/1988       SURGICAL HISTORY           Procedure Laterality Date    COLONOSCOPY  03/2015    1 benign polyp    UPPER GASTROINTESTINAL ENDOSCOPY  3/2915    3 ulcers - peptic and esophageal    WISDOM TOOTH EXTRACTION         CURRENT MEDICATIONS       Previous Medications    CITALOPRAM (CELEXA) 10 MG TABLET    Take 1 tablet by mouth daily    FLUTICASONE (FLONASE) 50 MCG/ACT NASAL SPRAY    2 sprays by Each Nare route daily    LISINOPRIL (PRINIVIL;ZESTRIL) 20 MG TABLET    Take 0.5 tablets by mouth daily    LORAZEPAM (ATIVAN) 0.5 MG TABLET    Take 1 tablet by mouth 2 times daily as needed for Anxiety for up to 90 days. METOPROLOL TARTRATE (LOPRESSOR) 25 MG TABLET    TAKE 1 TABLET BY MOUTH TWICE DAILY    OMEPRAZOLE (PRILOSEC) 40 MG DELAYED RELEASE CAPSULE    Take 1 capsule by mouth every morning (before breakfast)    SUCRALFATE (CARAFATE) 1 GM TABLET    Take 1 tablet by mouth 4 times daily    TRIAMTERENE-HYDROCHLOROTHIAZIDE (DYAZIDE) 37.5-25 MG PER CAPSULE    Take 1 capsule by mouth every morning       ALLERGIES     Patient has no known allergies.     FAMILY HISTORY           Problem Relation Age of Onset    Other Mother         gall bladder    Heart Attack Father 52        x3-4    Other Brother         congenital calcium deposits n bones    Cancer Maternal Aunt RDW 16.5 (*)     Neutrophils # 13.2 (*)     All other components within normal limits    Narrative:     Brad Gutierrez tel. [phone],  Hematology results called to and read back by Tato Huitron, 06/10/2019 22:37,  by [facility]  Performed at:  [facility]  [address] [address] [address]   Phone [phone]   COMPREHENSIVE METABOLIC PANEL - Abnormal; Notable for the following components:    Glucose 121 (*)     BUN 31 (*)     CREATININE 2.1 (*)     GFR Non- 34 (*)     GFR  41 (*)     Total Protein 6.3 (*)     AST 12 (*)     All other components within normal limits    Narrative:     Performed at:  [facility]  [address] [facility] [address] [address]   Phone [phone]   BLOOD OCCULT STOOL DIAGNOSTIC - Abnormal; Notable for the following components:    Occult Blood Diagnostic   (*)     Value: Result: POSITIVE  Normal range: Negative      All other components within normal limits    Narrative:     ORDER#: [id number]                          ORDERED BY: VIKASH ALARCON  SOURCE: Stool Loose                        COLLECTED:  06/10/19 22:40  ANTIBIOTICS AT RAKESH.:                      RECEIVED :  06/10/19 22:42  Performed at:  [facility]  [address] [facility] [address] [address]   Phone [phone]   TYPE AND SCREEN    Narrative:     Performed at:  [facility]  [address] [facility] [address] [address]   Phone [phone]   PREPARE RBC (CROSSMATCH)       All other labs were within normal range or not returned as of this dictation.     EMERGENCY DEPARTMENT COURSE and DIFFERENTIAL DIAGNOSIS/MDM:   Vitals:    Vitals:    06/10/19 2354 06/10/19 2356 06/10/19 2359 06/11/19 0002   BP:    (!) 101/58   Pulse: 107 104 106 108   Resp: 18 19 17 13   Temp:    98.5 °F (36.9 °C)   TempSrc:    Oral   SpO2: 99% 100% 100% 100%   Weight:       Height:

## 2019-06-11 NOTE — OP NOTE
retroflexed views. A small sliding hiatal hernia was present. There was no old blood, active bleeding, or source of bleeding seen on exam of the stomach. Duodenum: Multiple 1-2 mm non-bleeding duodenal erosions were present in the duodenal bulb. The 2nd portion of the duodenum appeared normal with normal villous pattern    The scope was then withdrawn back into the stomach, it was decompressed, and the scope was completely withdrawn. The patient tolerated the procedure well and was taken to the post anesthesia care unit in good condition. Estimated Blood Loss: Minimal     Impression:   1) See post procedure diagnoses    Recommendations:   1) Clear liquid diet. 2) Continue PPI gtt x72 hours, and then PO bid thereafter. 3) Start carafate 10 ml qid  4) Will obtain an H. Pylor stool antigen, and a serum gastrin level  5) Monitor H&H, and transfuse to Hgb >7  6) Patient will need a repeat EGD in 8 weeks to ensure healing of the esophageal ulcers.     KRUNAL Moon [address] [facility]

## 2019-06-11 NOTE — H&P
Hospital Medicine History & Physical      PCP: Kyle Carreno DO    Date of Admission: 6/10/2019    Date of Service: Pt seen/examined on 6/11/2019 and Admitted to Inpatient with expected LOS greater than two midnights due to medical therapy  Chief Complaint:  Melena      History Of Present Illness:   53 y.o. male who presented to [facility] [place] with black tarry stools. This has been going on approximately 1 week. Patient saw primary care physician and had lab work done and was found to be anemic and was sent to the emergency room for care. He admits that he's had some pain underneath his lungs 4 several weeks which she thought might be a bruised rib or respiratory tract infection. He's recently received steroids and antibiotics for sinus infection. Patient denied coffee-ground emesis. He did admit to feeling lightheaded and weak. Patient does have a history of previous ulcers. Patient has been on aspirin and steroids but is not taking any NSAIDs    Past Medical History:          Diagnosis Date    Anxiety     Fracture of finger of right hand     And knuckle    Hypertension     Peptic ulcer 2015    Tobacco abuse 10/30/1988       Past Surgical History:          Procedure Laterality Date    COLONOSCOPY  03/2015    1 benign polyp    UPPER GASTROINTESTINAL ENDOSCOPY  3/2915    3 ulcers - peptic and esophageal    WISDOM TOOTH EXTRACTION         Medications Prior to Admission:      Prior to Admission medications    Medication Sig Start Date End Date Taking?  Authorizing Provider   lisinopril (PRINIVIL;ZESTRIL) 20 MG tablet Take 0.5 tablets by mouth daily 6/10/19 9/8/19 Yes Caleb Serna DO   metoprolol tartrate (LOPRESSOR) 25 MG tablet TAKE 1 TABLET BY MOUTH TWICE DAILY 6/10/19  Yes Caleb Serna DO   triamterene-hydrochlorothiazide (DYAZIDE) 37.5-25 MG per capsule Take 1 capsule by mouth every morning 4/24/19  Yes Quentin Joel MD   LORazepam (ATIVAN) 0.5 MG tablet Take 1 tablet by mouth 2 times daily as needed for Anxiety for up to 90 days. 3/26/19 6/24/19 Yes Vernestine Slocumb, DO   citalopram (CELEXA) 10 MG tablet Take 1 tablet by mouth daily 3/26/19  Yes Vernestine Slocumb, DO   omeprazole (PRILOSEC) 40 MG delayed release capsule Take 1 capsule by mouth every morning (before breakfast) 6/10/19   Christian Prom, DO   sucralfate (CARAFATE) 1 GM tablet Take 1 tablet by mouth 4 times daily 6/10/19   Christian Prom, DO   fluticasone [facility]) 50 MCG/ACT nasal spray 2 sprays by Each Nare route daily 9/7/18   ÓSCAR Lui CNP       Allergies:  Patient has no known allergies. Social History:      The patient currently lives with family    TOBACCO:   reports that he has been smoking cigarettes. He started smoking about 31 years ago. He has a 30.00 pack-year smoking history. He has never used smokeless tobacco.  ETOH:   reports that he does not drink alcohol. Family History:      Reviewed in detail and negative for DM, CAD, Cancer, CVA. Positive as follows:        Problem Relation Age of Onset    Other Mother         gall bladder    Heart Attack Father 52        x3-4    Other Brother         congenital calcium deposits n bones    Cancer Maternal Aunt         brain    Cancer Maternal Grandmother     Cancer Maternal Grandfather     Asthma Paternal Grandfather     Other Maternal Aunt         lung issues    Hearing Loss Brother         congenital       REVIEW OF SYSTEMS:   Pertinent positives as noted in the HPI. All other systems reviewed and negative. PHYSICAL EXAM PERFORMED:    /60   Pulse 100   Temp 99.1 °F (37.3 °C) (Oral)   Resp 16   Ht 6' (1.829 m)   Wt 217 lb 2.5 oz (98.5 kg)   SpO2 99%   BMI 29.45 kg/m²     General appearance:  No apparent distress, appears stated age and cooperative. HEENT:  Normal cephalic, atraumatic without obvious deformity. Pupils equal, round, and reactive to light. Extra ocular muscles intact.  Conjunctivae/corneas

## 2019-06-11 NOTE — PLAN OF CARE
Problem: Falls - Risk of:  Goal: Will remain free from falls  Description  Will remain free from falls  6/11/2019 1555 by Ra Preston RN  Outcome: Ongoing  Note:   Fall risk assessment completed every shift. All precautions in place. Pt has call light within reach at all times. Room clear of clutter. Pt aware to call for assistance when getting up. Problem: Falls - Risk of:  Goal: Absence of physical injury  Description  Absence of physical injury  Outcome: Ongoing  Note:   No signs of physical injury. Problem: Bowel Function - Altered:  Goal: Bowel elimination is within specified parameters  Description  Bowel elimination is within specified parameters  6/11/2019 1555 by Ra Preston RN  Outcome: Ongoing  Note:   Pt has not had a bowel movement today. He states \"I haven't eaten anything how can I go\" nurse explained if he was bleeding he will still produce something.

## 2019-06-12 PROCEDURE — 1200000000 HC SEMI PRIVATE

## 2019-06-12 PROCEDURE — C9113 INJ PANTOPRAZOLE SODIUM, VIA: HCPCS | Performed by: INTERNAL MEDICINE

## 2019-06-12 PROCEDURE — 2580000003 HC RX 258: Performed by: INTERNAL MEDICINE

## 2019-06-12 PROCEDURE — 6370000000 HC RX 637 (ALT 250 FOR IP): Performed by: HOSPITALIST

## 2019-06-12 PROCEDURE — 6370000000 HC RX 637 (ALT 250 FOR IP): Performed by: INTERNAL MEDICINE

## 2019-06-12 PROCEDURE — 87338 HPYLORI STOOL AG IA: CPT

## 2019-06-12 PROCEDURE — 6360000002 HC RX W HCPCS: Performed by: INTERNAL MEDICINE

## 2019-06-12 PROCEDURE — 94760 N-INVAS EAR/PLS OXIMETRY 1: CPT

## 2019-06-12 RX ORDER — HYDROCHLOROTHIAZIDE 25 MG/1
25 TABLET ORAL DAILY
Status: DISCONTINUED | OUTPATIENT
Start: 2019-06-13 | End: 2019-06-13 | Stop reason: HOSPADM

## 2019-06-12 RX ORDER — LISINOPRIL 10 MG/1
10 TABLET ORAL DAILY
Status: DISCONTINUED | OUTPATIENT
Start: 2019-06-12 | End: 2019-06-13 | Stop reason: HOSPADM

## 2019-06-12 RX ORDER — LORAZEPAM 0.5 MG/1
0.5 TABLET ORAL 2 TIMES DAILY
Status: DISCONTINUED | OUTPATIENT
Start: 2019-06-12 | End: 2019-06-13 | Stop reason: HOSPADM

## 2019-06-12 RX ORDER — CITALOPRAM 10 MG/1
10 TABLET ORAL DAILY
Status: DISCONTINUED | OUTPATIENT
Start: 2019-06-12 | End: 2019-06-12 | Stop reason: SDUPTHER

## 2019-06-12 RX ORDER — FERROUS SULFATE TAB EC 324 MG (65 MG FE EQUIVALENT) 324 (65 FE) MG
324 TABLET DELAYED RESPONSE ORAL
Status: DISCONTINUED | OUTPATIENT
Start: 2019-06-13 | End: 2019-06-13 | Stop reason: HOSPADM

## 2019-06-12 RX ADMIN — SUCRALFATE 1 G: 1 TABLET ORAL at 05:43

## 2019-06-12 RX ADMIN — ACETAMINOPHEN 650 MG: 325 TABLET ORAL at 17:11

## 2019-06-12 RX ADMIN — ACETAMINOPHEN 650 MG: 325 TABLET ORAL at 05:46

## 2019-06-12 RX ADMIN — PANTOPRAZOLE SODIUM 8 MG/HR: 40 INJECTION, POWDER, FOR SOLUTION INTRAVENOUS at 15:10

## 2019-06-12 RX ADMIN — SODIUM CHLORIDE: 9 INJECTION, SOLUTION INTRAVENOUS at 15:12

## 2019-06-12 RX ADMIN — SUCRALFATE 1 G: 1 TABLET ORAL at 23:30

## 2019-06-12 RX ADMIN — SUCRALFATE 1 G: 1 TABLET ORAL at 12:31

## 2019-06-12 RX ADMIN — PANTOPRAZOLE SODIUM 8 MG/HR: 40 INJECTION, POWDER, FOR SOLUTION INTRAVENOUS at 05:52

## 2019-06-12 RX ADMIN — LORAZEPAM 0.5 MG: 0.5 TABLET ORAL at 20:06

## 2019-06-12 RX ADMIN — SODIUM CHLORIDE: 9 INJECTION, SOLUTION INTRAVENOUS at 09:44

## 2019-06-12 RX ADMIN — SUCRALFATE 1 G: 1 TABLET ORAL at 17:13

## 2019-06-12 RX ADMIN — LORAZEPAM 0.5 MG: 0.5 TABLET ORAL at 14:31

## 2019-06-12 RX ADMIN — SODIUM CHLORIDE: 9 INJECTION, SOLUTION INTRAVENOUS at 00:36

## 2019-06-12 RX ADMIN — CITALOPRAM HYDROBROMIDE 10 MG: 10 TABLET ORAL at 08:11

## 2019-06-12 RX ADMIN — LISINOPRIL 10 MG: 10 TABLET ORAL at 14:31

## 2019-06-12 RX ADMIN — SUCRALFATE 1 G: 1 TABLET ORAL at 00:36

## 2019-06-12 ASSESSMENT — PAIN DESCRIPTION - PROGRESSION
CLINICAL_PROGRESSION: NOT CHANGED
CLINICAL_PROGRESSION: GRADUALLY WORSENING
CLINICAL_PROGRESSION: NOT CHANGED

## 2019-06-12 ASSESSMENT — PAIN DESCRIPTION - ONSET
ONSET: ON-GOING

## 2019-06-12 ASSESSMENT — PAIN SCALES - GENERAL
PAINLEVEL_OUTOF10: 0
PAINLEVEL_OUTOF10: 1
PAINLEVEL_OUTOF10: 0
PAINLEVEL_OUTOF10: 3
PAINLEVEL_OUTOF10: 4
PAINLEVEL_OUTOF10: 5

## 2019-06-12 ASSESSMENT — PAIN DESCRIPTION - DESCRIPTORS
DESCRIPTORS: ACHING

## 2019-06-12 ASSESSMENT — PAIN - FUNCTIONAL ASSESSMENT
PAIN_FUNCTIONAL_ASSESSMENT: ACTIVITIES ARE NOT PREVENTED

## 2019-06-12 ASSESSMENT — PAIN DESCRIPTION - LOCATION
LOCATION: HEAD

## 2019-06-12 ASSESSMENT — PAIN DESCRIPTION - PAIN TYPE
TYPE: ACUTE PAIN

## 2019-06-12 ASSESSMENT — PAIN DESCRIPTION - FREQUENCY
FREQUENCY: CONTINUOUS

## 2019-06-12 NOTE — PROGRESS NOTES
100.0 fL    MCH 27.4 26.0 - 34.0 pg    MCHC 32.2 31.0 - 36.0 g/dL    RDW 16.0 (H) 12.4 - 15.4 %    Platelets 534 027 - 557 K/uL    MPV 8.7 5.0 - 10.5 fL    Neutrophils % 74.9 %    Lymphocytes % 17.7 %    Monocytes % 5.8 %    Eosinophils % 1.0 %    Basophils % 0.6 %    Neutrophils # 9.9 (H) 1.7 - 7.7 K/uL    Lymphocytes # 2.3 1.0 - 5.1 K/uL    Monocytes # 0.8 0.0 - 1.3 K/uL    Eosinophils # 0.1 0.0 - 0.6 K/uL    Basophils # 0.1 0.0 - 0.2 K/uL     Other Labs    Imaging    ASSESSMENT AND RECOMMENDATIONS   Rashmi Gautam is a 53 y.o. male who has a past medical history of Anxiety, Fracture of finger of right hand, History of blood transfusion, Hypertension, Peptic ulcer, and Tobacco abuse. He presents with the followin. Upper GI bleed, 2/2 severe ulcerative esophagitis: EGD 19 showed severe ulcerations in mid and distal esophagus without active bleeding. Continue PPI gtt. At discharge, will need to remain on PPI 40 mg bid. Continue carafate 1 gm qid x14 days. H. Pylori stool Ag and serum gastrin pending. Will need to repeat EGD in 8-12 weeks to ensure healing. 2. Anemia 2/2 acute blood loss: No evidence of active bleeding. Continue to monitor H&H, and transfuse to Hgb >7.  3. HIPOLITO: Suspect pre-renal HIPOLITO.       RECOMMENDATIONS:    - Continue IV PPI today. If no ongoing bleeding, possible discharge home 19 with PPI PO bid until repeat EGD.  - Continue carafate.  - Monitor H&H, and transfuse to Hgb >7  - Follow-up gastrin and H. Pylori stool Ag  - Repeat EGD in 8-12 weeks to ensure healing    If you have any questions or need any further information, please feel free to contact anyone on our consult team.  Thank you for allowing us to participate in the care of Rashmi Gautam. Delmer Reynolds.  [address] [facility]

## 2019-06-12 NOTE — PROGRESS NOTES
Pt doing okay. Has complaints of headache. Tylenol given. No s/s of bleeding, labs scheduled for iram per MD.  Stoney Bathe remains stable. Pt continues with tachycardia 100s at rest and increases with activity. Pt does have complaints of agitation and expresses wanting to go home. Explained pt may discharge iram per Dr Linda Charles and GI. Will monitor.   Electronically signed by Letty Garsia RN on 6/12/2019 at 6:01 PM

## 2019-06-12 NOTE — PROGRESS NOTES
Pt A&O.  VSS. Pt ate breakfast well. Denies nausea. Denies ABD pain. Pt says had large BM today that started black but turned brown. Will monitor.   Electronically signed by Cathy Lunsford RN on 6/12/2019 at 8:14 AM

## 2019-06-12 NOTE — PROGRESS NOTES
Hospitalist Progress Note  6/12/2019 9:54 AM    PCP: [id number] [address]    [id number]     Date of Admission: 6/10/2019                                                                                                                     HOSPITAL COURSE    Patient demographics:  The patient  Colleen Frost is a 53 y.o. male     Significant past medical history:   Patient Active Problem List   Diagnosis    Essential hypertension    Ulnar nerve abnormality    Anxiety    Tobacco abuse    GI bleed         Presenting symptoms:        Diagnostic workup:      CONSULTS DURING ADMISSION :   IP CONSULT TO GI      Patient was diagnosed with:        Treatment while inpatient:                                Repeat EGD in 8-12 weeks to ensure healing                                                             ----------------------------------------------------------      SUBJECTIVE COMPLAINTS- follow-up for upper GI bleed    Diet: DIET GENERAL;      OBJECTIVE:   Patient Active Problem List   Diagnosis    Essential hypertension    Ulnar nerve abnormality    Anxiety    Tobacco abuse    GI bleed       Allergies  Patient has no known allergies.     Medications    Scheduled Meds:   citalopram  10 mg Oral Daily    sodium chloride flush  10 mL Intravenous 2 times per day    sucralfate  1 g Oral 4 times per day     Continuous Infusions:   sodium chloride 100 mL/hr at 06/12/19 0944    pantoprozole (PROTONIX) infusion 8 mg/hr (06/12/19 0552)     PRN Meds:  sodium chloride flush, acetaminophen, ondansetron    Vitals   Vitals /wt   Patient Vitals for the past 8 hrs:   BP Temp Temp src Pulse Resp SpO2 Weight   06/12/19 0848 -- -- -- -- -- 99 % --   06/12/19 0800 (!) 147/81 98.1 °F (36.7 °C) Oral 93 16 99 % --   06/12/19 0418 117/70 97.7 °F (36.5 °C) Oral 90 16 99 % 214 lb 15.2 oz (97.5 kg)        72HR INTAKE/OUTPUT:      Intake/Output Summary (Last 24 hours) at 6/12/2019 0954  Last data filed at 6/12/2019 0947  Gross per 24 the mid esophagus  Multiple nonbleeding duodenal erosions  Continue on proton pump inhibitors IV    Acute blood loss anemia  Iron deficiency anemia  Start on iron supplements    Hypertension  Start on home medication      Code Status: Full Code        Dispo - continue care        The patient and / or the family were informed of the results of any tests, a time was given to answer questions, a plan was proposed and they agreed with plan. Raysa Fisher MD    This note was transcribed using [id number] "[employer]. Please disregard any translational errors.

## 2019-06-12 NOTE — PLAN OF CARE
Problem: Falls - Risk of:  Goal: Will remain free from falls  Description  Will remain free from falls  Outcome: Ongoing  Note:   Fall risk assessment completed. Fall precautions in place. Call light within reach. Pt educated on calling for assistance when needed. Pt UAL and remains steady with ambulation. Walkway free of clutter. Will continue to monitor. Problem: Bowel Function - Altered:  Goal: Bowel elimination is within specified parameters  Description  Bowel elimination is within specified parameters  Outcome: Ongoing  Note:   I&O monitored. Stool assessed. Labs monitored. Medications administered as directed. GI following.

## 2019-06-13 ENCOUNTER — TELEPHONE (OUTPATIENT)
Dept: FAMILY MEDICINE CLINIC | Age: 49
End: 2019-06-13

## 2019-06-13 VITALS
BODY MASS INDEX: 29.68 KG/M2 | HEART RATE: 92 BPM | HEIGHT: 72 IN | WEIGHT: 219.14 LBS | TEMPERATURE: 98 F | DIASTOLIC BLOOD PRESSURE: 86 MMHG | RESPIRATION RATE: 12 BRPM | OXYGEN SATURATION: 97 % | SYSTOLIC BLOOD PRESSURE: 143 MMHG

## 2019-06-13 LAB
ANION GAP SERPL CALCULATED.3IONS-SCNC: 9 MMOL/L (ref 3–16)
BUN BLDV-MCNC: 10 MG/DL (ref 7–20)
CALCIUM SERPL-MCNC: 8.3 MG/DL (ref 8.3–10.6)
CHLORIDE BLD-SCNC: 106 MMOL/L (ref 99–110)
CO2: 22 MMOL/L (ref 21–32)
CREAT SERPL-MCNC: 1.1 MG/DL (ref 0.9–1.3)
GASTRIN: 13 PG/ML (ref 0–100)
GFR AFRICAN AMERICAN: >60
GFR NON-AFRICAN AMERICAN: >60
GLUCOSE BLD-MCNC: 101 MG/DL (ref 70–99)
H PYLORI ANTIGEN STOOL: NEGATIVE
HCT VFR BLD CALC: 21.8 % (ref 40.5–52.5)
HEMOGLOBIN: 7.2 G/DL (ref 13.5–17.5)
MCH RBC QN AUTO: 28.1 PG (ref 26–34)
MCHC RBC AUTO-ENTMCNC: 33 G/DL (ref 31–36)
MCV RBC AUTO: 85.2 FL (ref 80–100)
PDW BLD-RTO: 16.8 % (ref 12.4–15.4)
PLATELET # BLD: 408 K/UL (ref 135–450)
PMV BLD AUTO: 7.8 FL (ref 5–10.5)
POTASSIUM SERPL-SCNC: 3.6 MMOL/L (ref 3.5–5.1)
RBC # BLD: 2.56 M/UL (ref 4.2–5.9)
SODIUM BLD-SCNC: 137 MMOL/L (ref 136–145)
WBC # BLD: 10.1 K/UL (ref 4–11)

## 2019-06-13 PROCEDURE — 80048 BASIC METABOLIC PNL TOTAL CA: CPT

## 2019-06-13 PROCEDURE — 6370000000 HC RX 637 (ALT 250 FOR IP): Performed by: HOSPITALIST

## 2019-06-13 PROCEDURE — 2580000003 HC RX 258: Performed by: INTERNAL MEDICINE

## 2019-06-13 PROCEDURE — C9113 INJ PANTOPRAZOLE SODIUM, VIA: HCPCS | Performed by: INTERNAL MEDICINE

## 2019-06-13 PROCEDURE — 6360000002 HC RX W HCPCS: Performed by: INTERNAL MEDICINE

## 2019-06-13 PROCEDURE — 6370000000 HC RX 637 (ALT 250 FOR IP): Performed by: INTERNAL MEDICINE

## 2019-06-13 PROCEDURE — 85027 COMPLETE CBC AUTOMATED: CPT

## 2019-06-13 PROCEDURE — 94760 N-INVAS EAR/PLS OXIMETRY 1: CPT

## 2019-06-13 PROCEDURE — 36415 COLL VENOUS BLD VENIPUNCTURE: CPT

## 2019-06-13 RX ORDER — FERROUS SULFATE 325(65) MG
325 TABLET ORAL
Qty: 30 TABLET | Refills: 0 | Status: SHIPPED | OUTPATIENT
Start: 2019-06-13 | End: 2019-09-09 | Stop reason: ALTCHOICE

## 2019-06-13 RX ORDER — PANTOPRAZOLE SODIUM 40 MG/1
40 TABLET, DELAYED RELEASE ORAL
Status: DISCONTINUED | OUTPATIENT
Start: 2019-06-13 | End: 2019-06-13 | Stop reason: HOSPADM

## 2019-06-13 RX ORDER — PANTOPRAZOLE SODIUM 40 MG/1
40 TABLET, DELAYED RELEASE ORAL DAILY
Qty: 30 TABLET | Refills: 0 | Status: SHIPPED | OUTPATIENT
Start: 2019-06-13 | End: 2019-09-09 | Stop reason: DRUGHIGH

## 2019-06-13 RX ADMIN — SUCRALFATE 1 G: 1 TABLET ORAL at 06:32

## 2019-06-13 RX ADMIN — PANTOPRAZOLE SODIUM 8 MG/HR: 40 INJECTION, POWDER, FOR SOLUTION INTRAVENOUS at 04:25

## 2019-06-13 RX ADMIN — LORAZEPAM 0.5 MG: 0.5 TABLET ORAL at 08:25

## 2019-06-13 RX ADMIN — CITALOPRAM HYDROBROMIDE 10 MG: 10 TABLET ORAL at 08:25

## 2019-06-13 RX ADMIN — METOPROLOL TARTRATE 25 MG: 25 TABLET ORAL at 08:25

## 2019-06-13 RX ADMIN — SODIUM CHLORIDE: 9 INJECTION, SOLUTION INTRAVENOUS at 03:03

## 2019-06-13 RX ADMIN — LISINOPRIL 10 MG: 10 TABLET ORAL at 08:25

## 2019-06-13 RX ADMIN — SUCRALFATE 1 G: 1 TABLET ORAL at 11:23

## 2019-06-13 RX ADMIN — FERROUS SULFATE TAB EC 324 MG (65 MG FE EQUIVALENT) 324 MG: 324 (65 FE) TABLET DELAYED RESPONSE at 08:25

## 2019-06-13 RX ADMIN — HYDROCHLOROTHIAZIDE 25 MG: 25 TABLET ORAL at 08:25

## 2019-06-13 RX ADMIN — ACETAMINOPHEN 650 MG: 325 TABLET ORAL at 01:51

## 2019-06-13 ASSESSMENT — PAIN SCALES - GENERAL
PAINLEVEL_OUTOF10: 0
PAINLEVEL_OUTOF10: 0
PAINLEVEL_OUTOF10: 3
PAINLEVEL_OUTOF10: 0

## 2019-06-13 ASSESSMENT — PAIN DESCRIPTION - ORIENTATION: ORIENTATION: OTHER (COMMENT)

## 2019-06-13 ASSESSMENT — PAIN DESCRIPTION - LOCATION: LOCATION: HEAD

## 2019-06-13 ASSESSMENT — PAIN SCALES - WONG BAKER
WONGBAKER_NUMERICALRESPONSE: 0

## 2019-06-13 ASSESSMENT — PAIN DESCRIPTION - PROGRESSION
CLINICAL_PROGRESSION: NOT CHANGED

## 2019-06-13 ASSESSMENT — PAIN DESCRIPTION - PAIN TYPE: TYPE: ACUTE PAIN

## 2019-06-13 ASSESSMENT — PAIN DESCRIPTION - ONSET: ONSET: AWAKENED FROM SLEEP

## 2019-06-13 ASSESSMENT — PAIN DESCRIPTION - DESCRIPTORS: DESCRIPTORS: HEADACHE

## 2019-06-13 ASSESSMENT — PAIN - FUNCTIONAL ASSESSMENT: PAIN_FUNCTIONAL_ASSESSMENT: ACTIVITIES ARE NOT PREVENTED

## 2019-06-13 ASSESSMENT — PAIN DESCRIPTION - FREQUENCY: FREQUENCY: CONTINUOUS

## 2019-06-13 NOTE — PLAN OF CARE
Problem: Falls - Risk of:  Goal: Will remain free from falls  Description  Will remain free from falls  6/13/2019 0026 by Rodrigo Foster RN  Outcome: Ongoing   Patient assessed for fall risk; fall precautions initiated. Patient instructed about safety devices. Environment kept free of clutter and adequate lighting provided. Bed locked and in lowest position. Call light within reach. Will continue to monitor. Problem: Bowel Function - Altered:  Goal: Bowel elimination is within specified parameters  Description  Bowel elimination is within specified parameters  6/13/2019 0026 by Rodrigo Foster RN  Outcome: Ongoing  I&O charted and assessed per MD order. Pt tolerating adequate fluid intake.

## 2019-06-13 NOTE — TELEPHONE ENCOUNTER
PT WAS IN THE HOSPITAL UPPER GI BLEED    CARAFATE LIQUID 10 MLS BY MOUTH 4 X DAILY    SUCRALFATE 1 TABLE BY MOUTH 4 X DAILY    WHICH ONE SHOULD HE TAKE - [address] [place] [address]?    PT @  [phone]

## 2019-06-13 NOTE — PROGRESS NOTES
INPATIENT CONSULTATION:    IDENTIFYING DATA/REASON FOR CONSULTATION   PATIENT:  Kelby Dutton  MRN:  [id number]  ADMIT DATE: 6/10/2019  TIME OF EVALUATION: 6/13/2019 9:31 AM  HOSPITAL STAY:   LOS: 3 days   CONSULTING PHYSICIAN: Breann Caputo MD   REASON FOR CONSULTATION:    Subjective:    Patient reports he is feeling better today. He has no abdominal pain, no vomiting. He had a bowel movement this morning and stool was brown and formed. MEDICATIONS   SCHEDULED:    lisinopril 10 mg Daily   LORazepam 0.5 mg BID   hydrochlorothiazide 25 mg Daily   metoprolol tartrate 25 mg BID   ferrous sulfate 324 mg Daily with breakfast   citalopram 10 mg Daily   sodium chloride flush 10 mL 2 times per day   sucralfate 1 g 4 times per day     FLUIDS/DRIPS:     sodium chloride 100 mL/hr at 06/13/19 0303    pantoprozole (PROTONIX) infusion 8 mg/hr (06/13/19 0425)     PRNs:   sodium chloride flush 10 mL PRN   acetaminophen 650 mg Q4H PRN   ondansetron 4 mg Q6H PRN     ALLERGIES:  No Known Allergies      PHYSICAL EXAM     Vitals:    06/13/19 0004 06/13/19 0537 06/13/19 0745 06/13/19 0844   BP: 116/78 (!) 150/91 (!) 141/79    Pulse: 97 88 97    Resp: 17 12 12    Temp: 98 °F (36.7 °C) 98.1 °F (36.7 °C) 98.2 °F (36.8 °C)    TempSrc: Oral Oral Oral    SpO2: 98% 98% 99% 99%   Weight:  219 lb 2.2 oz (99.4 kg)     Height:           I/O last 3 completed shifts: In: 3416.4 [P.O.:900; I.V.:2516.4]  Out: -     Physical Exam:  General appearance: alert, cooperative, no distress, appears stated age  Eyes: Anicteric  Head: Normocephalic, without obvious abnormality  Lungs: clear to auscultation bilaterally, Normal Effort  Heart: regular rate and rhythm, normal S1 and S2, no murmurs or rubs  Abdomen: soft, non-distended, non-tender. Bowel sounds normal. No masses,  no organomegaly.    Extremities: atraumatic, no cyanosis or edema  Skin: warm and dry, no jaundice  Neuro: Grossly intact, A&OX3    LABS AND IMAGING   Laboratory   Recent Labs     06/10/19  2215 06/11/19  0935 06/13/19  0557   WBC 17.8* 13.2* 10.1   HGB 6.0* 7.5* 7.2*   HCT 18.7* 23.2* 21.8*   MCV 83.5 84.9 85.2    341 408     Recent Labs     06/10/19  2215 06/13/19  0557    137   K 4.0 3.6   CL 99 106   CO2 24 22   BUN 31* 10   CREATININE 2.1* 1.1     Recent Labs     06/10/19  2215   AST 12*   ALT 17   BILITOT <0.2   ALKPHOS 62     No results for input(s): LIPASE, AMYLASE in the last 72 hours. No results for input(s): PROTIME, INR in the last 72 hours. Imaging  No orders to display       Endoscopy  EGD 6/11/19 with Dr. Jose Henson  1. Erosive esophagitis with multiple ulcerations of the mid-esophagus. The ulcers were non-bleeding, but oozed blood with patient retching during the procedure. 2. Large 2 cm ulcer with adherent clot in the distal esophagus. The lesion was not bleeding, and was not intervened upon due to its size. 3. Multiple non-bleeding duodenal erosions. ASSESSMENT AND RECOMMENDATIONS   Denise Reed is a 53 y.o. male with a PMH of HTN, peptic ulcer and tobacco use who presented on 6/10/2019 with black tarry stools, fatigue and lightheadedness. Found to have a hgb of 6.0 (from 15 in Sept 2018). EGD completed 6/11/19- erosive esophagitis, large 2 cm distal esophageal ulcer with adherent clot, non bleeding duodenal erosions. 1. Upper GI bleed, 2/2 severe ulcerative esophagitis:  H. Pylori stool Ag and serum gastrin pending. On PPI gtt and carafate 1 gm qid x14 days. 2. Anemia 2/2 acute blood loss: hgb fairly stable post transfusion. No evidence of active bleeding. 3. HIPOLITO: Suspect pre-renal HIPOLITO.         RECOMMENDATIONS:    F/u on H pylori stool Ag and gastrin level  Will switch PPI drip to BID dosing. Will need to continue PPI BID for at discharge until repeat EGD. Continue Carafate 1 gm QId  X 14 days.   Repeat EGD in 8-12 weeks with Dr. Jose Henson to ensure healing  Ok from GI standpoint for discharge      If you have any questions or need

## 2019-06-16 NOTE — DISCHARGE SUMMARY
Hospital Medicine Discharge Summary      Patient ID: Karyna Kirby , [id number]     Patient's PCP: [id number] [address]    Admit Date: 6/10/2019     Discharge Date: 6/13/2019      Admitting Physician: Kathia Stinson MD    Discharge Physician: Natalya Harvey MD     Discharge Diagnoses: Active Hospital Problems    Diagnosis Date Noted    GI bleed [K92.2] 06/10/2019         The patient was seen and examined on the day of discharge and this discharge summary is in conjunction with any daily progress note from day of discharge. Hospital Course:        Patient demographics:  The patient  Karyna Kirby is a 53 y.o. male      Significant past medical history:       Patient Active Problem List   Diagnosis    Essential hypertension    Ulnar nerve abnormality    Anxiety    Tobacco abuse    GI bleed            Presenting symptoms:  Melena              Diagnostic workup:  MRI IAC POSTERIOR FOSSA W WO CONTRAST            CONSULTS DURING ADMISSION :   IP CONSULT TO GI        Patient was diagnosed with:  Upper GI bleed  Acute blood loss anemia  Iron deficiency anemia  Acute renal failure           Treatment while inpatient:  Patient presented to the emergency room with black tarry stools. for more than 1 week. Patient also reported dyspnea of exertion. Patient was diagnosed with acute blood loss anemia and was given blood transfusion. GI was consulted and patient underwent EGD                        Patient was diagnosed with erosive esophagitis large 2 cm this esophageal ulcer  nonbleeding duodenal erosions. Upper GI bleed is considered to be due to ulcerative esophagitis  Patient's acute renal failure most likely prerenal improved  Patient is to get another EEG in 6-8 weeks to confirm healing of the ulcers. Patient will be discharged home on twice a day dose of proton pump inhibitors.   Patient will also be given iron supplements        Discharge Condition:  stable      Discharged to:  Home    Activity:   as tolerated:     Follow Up: Follow-up up with GI in 6 weeks.                   Labs: For convenience and continuity at follow-up the following most recent labs are provided:      CBC:   Lab Results   Component Value Date    WBC 10.1 06/13/2019    HGB 7.2 06/13/2019    HCT 21.8 06/13/2019     06/13/2019       RENAL:   Lab Results   Component Value Date     06/13/2019    K 3.6 06/13/2019    K 4.2 09/03/2018     06/13/2019    CO2 22 06/13/2019    BUN 10 06/13/2019    CREATININE 1.1 06/13/2019           Discharge Medications:    Sundra Mo   Home Medication Instructions DARWIN:[id number]    Printed on:06/16/19 8155   Medication Information                      citalopram (CELEXA) 10 MG tablet  Take 1 tablet by mouth daily             ferrous sulfate 325 (65 Fe) MG tablet  Take 1 tablet by mouth daily (with breakfast)             fluticasone (FLONASE) 50 MCG/ACT nasal spray  2 sprays by Each Nare route daily             lisinopril (PRINIVIL;ZESTRIL) 20 MG tablet  Take 0.5 tablets by mouth daily             LORazepam (ATIVAN) 0.5 MG tablet  Take 1 tablet by mouth 2 times daily as needed for Anxiety for up to 90 days. metoprolol tartrate (LOPRESSOR) 25 MG tablet  TAKE 1 TABLET BY MOUTH TWICE DAILY             pantoprazole (PROTONIX) 40 MG tablet  Take 1 tablet by mouth daily             sucralfate (CARAFATE) 1 GM tablet  Take 1 tablet by mouth 4 times daily             triamterene-hydrochlorothiazide (DYAZIDE) 37.5-25 MG per capsule  Take 1 capsule by mouth every morning                    Time Spent on discharge is more than 30 min in the examination, evaluation, counseling and review of medications and discharge plan. Signed:  Juan Jose Keith MD   6/16/2019      Thank you Elvis Pompa DO for the opportunity to be involved in this patient's care. If you have any questions or concerns please feel free to contact me at [phone].     This note was transcribed using [id number] [employer]. Please disregard any translational errors.

## 2019-06-23 DIAGNOSIS — F41.9 ANXIETY: ICD-10-CM

## 2019-06-24 RX ORDER — LORAZEPAM 0.5 MG/1
TABLET ORAL
Qty: 20 TABLET | Refills: 0 | Status: SHIPPED | OUTPATIENT
Start: 2019-06-24 | End: 2019-07-01 | Stop reason: SDUPTHER

## 2019-06-25 DIAGNOSIS — H81.02 MENIERE'S DISEASE OF LEFT EAR: ICD-10-CM

## 2019-06-25 RX ORDER — TRIAMTERENE AND HYDROCHLOROTHIAZIDE 37.5; 25 MG/1; MG/1
CAPSULE ORAL
Qty: 30 CAPSULE | Refills: 1 | Status: SHIPPED | OUTPATIENT
Start: 2019-06-25 | End: 2019-09-20

## 2019-07-01 ENCOUNTER — OFFICE VISIT (OUTPATIENT)
Dept: FAMILY MEDICINE CLINIC | Age: 49
End: 2019-07-01
Payer: MEDICARE

## 2019-07-01 VITALS
DIASTOLIC BLOOD PRESSURE: 70 MMHG | SYSTOLIC BLOOD PRESSURE: 104 MMHG | RESPIRATION RATE: 16 BRPM | BODY MASS INDEX: 29.93 KG/M2 | HEIGHT: 72 IN | WEIGHT: 221 LBS | HEART RATE: 80 BPM

## 2019-07-01 DIAGNOSIS — F41.9 ANXIETY: Primary | ICD-10-CM

## 2019-07-01 DIAGNOSIS — D64.9 ANEMIA, UNSPECIFIED TYPE: ICD-10-CM

## 2019-07-01 LAB
BASOPHILS ABSOLUTE: 0 K/UL (ref 0–0.2)
BASOPHILS RELATIVE PERCENT: 0.6 %
EOSINOPHILS ABSOLUTE: 0.3 K/UL (ref 0–0.6)
EOSINOPHILS RELATIVE PERCENT: 3.4 %
HCT VFR BLD CALC: 29.8 % (ref 40.5–52.5)
HEMOGLOBIN: 9.6 G/DL (ref 13.5–17.5)
LYMPHOCYTES ABSOLUTE: 1.4 K/UL (ref 1–5.1)
LYMPHOCYTES RELATIVE PERCENT: 17.4 %
MCH RBC QN AUTO: 27.3 PG (ref 26–34)
MCHC RBC AUTO-ENTMCNC: 32.2 G/DL (ref 31–36)
MCV RBC AUTO: 84.7 FL (ref 80–100)
MONOCYTES ABSOLUTE: 0.6 K/UL (ref 0–1.3)
MONOCYTES RELATIVE PERCENT: 6.9 %
NEUTROPHILS ABSOLUTE: 5.8 K/UL (ref 1.7–7.7)
NEUTROPHILS RELATIVE PERCENT: 71.7 %
PDW BLD-RTO: 16.2 % (ref 12.4–15.4)
PLATELET # BLD: 344 K/UL (ref 135–450)
PMV BLD AUTO: 8.7 FL (ref 5–10.5)
RBC # BLD: 3.52 M/UL (ref 4.2–5.9)
WBC # BLD: 8.1 K/UL (ref 4–11)

## 2019-07-01 PROCEDURE — 1111F DSCHRG MED/CURRENT MED MERGE: CPT | Performed by: FAMILY MEDICINE

## 2019-07-01 PROCEDURE — 1036F TOBACCO NON-USER: CPT | Performed by: FAMILY MEDICINE

## 2019-07-01 PROCEDURE — G8417 CALC BMI ABV UP PARAM F/U: HCPCS | Performed by: FAMILY MEDICINE

## 2019-07-01 PROCEDURE — 99214 OFFICE O/P EST MOD 30 MIN: CPT | Performed by: FAMILY MEDICINE

## 2019-07-01 PROCEDURE — G8427 DOCREV CUR MEDS BY ELIG CLIN: HCPCS | Performed by: FAMILY MEDICINE

## 2019-07-01 PROCEDURE — 36415 COLL VENOUS BLD VENIPUNCTURE: CPT | Performed by: FAMILY MEDICINE

## 2019-07-01 RX ORDER — LORAZEPAM 0.5 MG/1
TABLET ORAL
Qty: 45 TABLET | Refills: 2 | Status: SHIPPED | OUTPATIENT
Start: 2019-07-01 | End: 2019-10-01

## 2019-07-01 ASSESSMENT — ENCOUNTER SYMPTOMS
SHORTNESS OF BREATH: 0
DIARRHEA: 0
WHEEZING: 0
CONSTIPATION: 0
COUGH: 0
ABDOMINAL PAIN: 0
VOMITING: 0

## 2019-07-01 NOTE — PROGRESS NOTES
[facility]  Clinic Note    Date: 7/1/2019                                               Subjective:     Chief Complaint   Patient presents with    Hypotension    Other     3 WEEK FOLLOW UP FOR STOMACH ULCER AND INTERNAL BLEEDING FORM ULCER IN THROAT?  Anxiety     3 MO ANXIETY ROUTINE FOLLOW UP TIRED OF JUMPING THROUGH HOOPS TO GET THIS MEDICATION PT CALLED ON 6/7/2019 TO GET REFILL AND WAS DENIED DUE TO EARLY FILL BUT PT IS ABLE TO TAKE 2 A DAY IF NEEDED , PT WAS TOLD BY ENT THAT HE HAS TO TAKE THIS EVERY DAY NO MATTER WHAT TO HELP WITH HIS ANXIETY      HPI   Hospital follow up. Admitted to hospital June 10th for upper GI bleed, hgb of 6.3 at time of office visit. Says he was told he wasn't drinking enough fluids when he was taking the steroids from ENT, says it sat there and \"burned a hole\" in his esopahgus. Has to get 2 units of blood when hgb went to 6.0. Has not had tested since hospital d/c, at which point it was 7.2 on the 13th. Has to get another EGD , scheduled July 22, feeling better overall. Still taking Carafate and Protonix. Takes Lorazepam chronically for anxiety. Some days takes twice daily if having anxious day, according to Dr. Jesus Rincon, he has been told to take it every night, agreeable to 45 tabs per month as doesn't always use the second dose. On Celexa daily for anxiety and depression as well.            Patient Active Problem List    Diagnosis Date Noted    GI bleed 06/10/2019    Anxiety     Essential hypertension 09/07/2018    Ulnar nerve abnormality 04/17/2013    Tobacco abuse 10/30/1988     Past Medical History:   Diagnosis Date    Anxiety     Former smoker 05/2019    Fracture of finger of right hand     And knuckle    History of blood transfusion     new this admission 6/10/19    Hypertension     Peptic ulcer 2015    Tobacco abuse 10/30/1988     Past Surgical History:   Procedure Laterality Date    COLONOSCOPY  03/2015    1 benign polyp    DO JUDSON  7/1/2019  10:35 AM

## 2019-09-09 ENCOUNTER — OFFICE VISIT (OUTPATIENT)
Dept: FAMILY MEDICINE CLINIC | Age: 49
End: 2019-09-09
Payer: MEDICARE

## 2019-09-09 VITALS
WEIGHT: 224 LBS | DIASTOLIC BLOOD PRESSURE: 84 MMHG | HEIGHT: 72 IN | RESPIRATION RATE: 18 BRPM | HEART RATE: 85 BPM | SYSTOLIC BLOOD PRESSURE: 126 MMHG | BODY MASS INDEX: 30.34 KG/M2 | OXYGEN SATURATION: 98 %

## 2019-09-09 DIAGNOSIS — D64.9 ANEMIA, UNSPECIFIED TYPE: Primary | ICD-10-CM

## 2019-09-09 LAB
BASOPHILS ABSOLUTE: 0.1 K/UL (ref 0–0.2)
BASOPHILS RELATIVE PERCENT: 0.6 %
EOSINOPHILS ABSOLUTE: 0.4 K/UL (ref 0–0.6)
EOSINOPHILS RELATIVE PERCENT: 3.8 %
FERRITIN: 8.4 NG/ML (ref 30–400)
HCT VFR BLD CALC: 36.1 % (ref 40.5–52.5)
HEMOGLOBIN: 11.4 G/DL (ref 13.5–17.5)
IRON SATURATION: 7 % (ref 20–50)
IRON: 25 UG/DL (ref 59–158)
LYMPHOCYTES ABSOLUTE: 1.7 K/UL (ref 1–5.1)
LYMPHOCYTES RELATIVE PERCENT: 18.7 %
MCH RBC QN AUTO: 24.2 PG (ref 26–34)
MCHC RBC AUTO-ENTMCNC: 31.6 G/DL (ref 31–36)
MCV RBC AUTO: 76.4 FL (ref 80–100)
MONOCYTES ABSOLUTE: 0.7 K/UL (ref 0–1.3)
MONOCYTES RELATIVE PERCENT: 7.3 %
NEUTROPHILS ABSOLUTE: 6.5 K/UL (ref 1.7–7.7)
NEUTROPHILS RELATIVE PERCENT: 69.6 %
PDW BLD-RTO: 15.6 % (ref 12.4–15.4)
PLATELET # BLD: 341 K/UL (ref 135–450)
PMV BLD AUTO: 9.1 FL (ref 5–10.5)
RBC # BLD: 4.73 M/UL (ref 4.2–5.9)
TOTAL IRON BINDING CAPACITY: 378 UG/DL (ref 260–445)
WBC # BLD: 9.4 K/UL (ref 4–11)

## 2019-09-09 PROCEDURE — G8417 CALC BMI ABV UP PARAM F/U: HCPCS | Performed by: FAMILY MEDICINE

## 2019-09-09 PROCEDURE — 1036F TOBACCO NON-USER: CPT | Performed by: FAMILY MEDICINE

## 2019-09-09 PROCEDURE — 99213 OFFICE O/P EST LOW 20 MIN: CPT | Performed by: FAMILY MEDICINE

## 2019-09-09 PROCEDURE — 36415 COLL VENOUS BLD VENIPUNCTURE: CPT | Performed by: FAMILY MEDICINE

## 2019-09-09 PROCEDURE — G8427 DOCREV CUR MEDS BY ELIG CLIN: HCPCS | Performed by: FAMILY MEDICINE

## 2019-09-09 RX ORDER — PANTOPRAZOLE SODIUM 40 MG/1
40 TABLET, DELAYED RELEASE ORAL 2 TIMES DAILY
Qty: 60 TABLET | Refills: 0 | Status: ON HOLD | COMMUNITY
Start: 2019-09-09 | End: 2020-01-08 | Stop reason: SDUPTHER

## 2019-09-09 RX ORDER — FERROUS SULFATE 325(65) MG
325 TABLET ORAL
Qty: 30 TABLET | Refills: 2 | Status: ON HOLD | OUTPATIENT
Start: 2019-09-09 | End: 2020-01-07

## 2019-09-09 RX ORDER — CETIRIZINE HYDROCHLORIDE 10 MG/1
10 TABLET ORAL DAILY
Qty: 30 TABLET | Refills: 0 | COMMUNITY
Start: 2019-09-09 | End: 2020-02-24

## 2019-09-09 ASSESSMENT — ENCOUNTER SYMPTOMS
BLOOD IN STOOL: 1
COUGH: 1
ABDOMINAL PAIN: 0
WHEEZING: 0
SHORTNESS OF BREATH: 1
CONSTIPATION: 0
CHOKING: 0
DIARRHEA: 0

## 2019-09-12 DIAGNOSIS — I10 ESSENTIAL HYPERTENSION: ICD-10-CM

## 2019-09-20 ENCOUNTER — APPOINTMENT (OUTPATIENT)
Dept: GENERAL RADIOLOGY | Age: 49
End: 2019-09-20
Payer: MEDICARE

## 2019-09-20 ENCOUNTER — HOSPITAL ENCOUNTER (EMERGENCY)
Age: 49
Discharge: HOME | End: 2019-09-20
Attending: EMERGENCY MEDICINE
Payer: MEDICARE

## 2019-09-20 VITALS
TEMPERATURE: 98.9 F | OXYGEN SATURATION: 95 % | WEIGHT: 220.46 LBS | HEIGHT: 72 IN | DIASTOLIC BLOOD PRESSURE: 90 MMHG | RESPIRATION RATE: 20 BRPM | BODY MASS INDEX: 29.86 KG/M2 | SYSTOLIC BLOOD PRESSURE: 154 MMHG | HEART RATE: 63 BPM

## 2019-09-20 DIAGNOSIS — J45.909 REACTIVE AIRWAY DISEASE WITHOUT COMPLICATION, UNSPECIFIED ASTHMA SEVERITY, UNSPECIFIED WHETHER PERSISTENT: ICD-10-CM

## 2019-09-20 DIAGNOSIS — J40 BRONCHITIS: Primary | ICD-10-CM

## 2019-09-20 DIAGNOSIS — R05.9 COUGH: ICD-10-CM

## 2019-09-20 LAB
EKG ATRIAL RATE: 68 BPM
EKG DIAGNOSIS: NORMAL
EKG P AXIS: 51 DEGREES
EKG P-R INTERVAL: 204 MS
EKG Q-T INTERVAL: 386 MS
EKG QRS DURATION: 102 MS
EKG QTC CALCULATION (BAZETT): 410 MS
EKG R AXIS: 71 DEGREES
EKG T AXIS: 61 DEGREES
EKG VENTRICULAR RATE: 68 BPM

## 2019-09-20 PROCEDURE — 6370000000 HC RX 637 (ALT 250 FOR IP): Performed by: EMERGENCY MEDICINE

## 2019-09-20 PROCEDURE — 93005 ELECTROCARDIOGRAM TRACING: CPT | Performed by: EMERGENCY MEDICINE

## 2019-09-20 PROCEDURE — 71046 X-RAY EXAM CHEST 2 VIEWS: CPT

## 2019-09-20 PROCEDURE — 93010 ELECTROCARDIOGRAM REPORT: CPT | Performed by: INTERNAL MEDICINE

## 2019-09-20 PROCEDURE — 99284 EMERGENCY DEPT VISIT MOD MDM: CPT

## 2019-09-20 PROCEDURE — 94640 AIRWAY INHALATION TREATMENT: CPT

## 2019-09-20 PROCEDURE — 87798 DETECT AGENT NOS DNA AMP: CPT

## 2019-09-20 RX ORDER — BENZONATATE 100 MG/1
100 CAPSULE ORAL 3 TIMES DAILY PRN
Qty: 30 CAPSULE | Refills: 0 | Status: SHIPPED | OUTPATIENT
Start: 2019-09-20 | End: 2019-09-27

## 2019-09-20 RX ORDER — BENZONATATE 100 MG/1
100 CAPSULE ORAL 3 TIMES DAILY PRN
Qty: 30 CAPSULE | Refills: 0 | Status: SHIPPED | OUTPATIENT
Start: 2019-09-20 | End: 2019-09-20 | Stop reason: ALTCHOICE

## 2019-09-20 RX ORDER — AZITHROMYCIN 250 MG/1
TABLET, FILM COATED ORAL
Qty: 1 PACKET | Refills: 0 | Status: SHIPPED | OUTPATIENT
Start: 2019-09-20 | End: 2019-10-09 | Stop reason: ALTCHOICE

## 2019-09-20 RX ORDER — ALBUTEROL SULFATE 90 UG/1
2 AEROSOL, METERED RESPIRATORY (INHALATION) EVERY 4 HOURS PRN
Qty: 1 INHALER | Refills: 0 | Status: SHIPPED | OUTPATIENT
Start: 2019-09-20 | End: 2019-10-03 | Stop reason: SDUPTHER

## 2019-09-20 RX ORDER — IPRATROPIUM BROMIDE AND ALBUTEROL SULFATE 2.5; .5 MG/3ML; MG/3ML
1 SOLUTION RESPIRATORY (INHALATION) ONCE
Status: COMPLETED | OUTPATIENT
Start: 2019-09-20 | End: 2019-09-20

## 2019-09-20 RX ADMIN — IPRATROPIUM BROMIDE AND ALBUTEROL SULFATE 1 AMPULE: .5; 3 SOLUTION RESPIRATORY (INHALATION) at 13:19

## 2019-09-20 NOTE — ED PROVIDER NOTES
History of blood transfusion     new this admission 6/10/19    Hypertension     Peptic ulcer         Surgical History:   Past Surgical History:   Procedure Laterality Date    COLONOSCOPY  2015    1 benign polyp    ENDOSCOPY, COLON, DIAGNOSTIC      UPPER GASTROINTESTINAL ENDOSCOPY  3/2915    3 ulcers - peptic and esophageal    UPPER GASTROINTESTINAL ENDOSCOPY  2019    +ulcers    UPPER GASTROINTESTINAL ENDOSCOPY N/A 2019    EGD DIAGNOSTIC ONLY performed by Matthias Cabello MD at [facility]  2019    Dr. Ortiz August, [facility], ulcer healing in esophagitis, Parra's 1    WISDOM TOOTH EXTRACTION          Family History:    Family History   Problem Relation Age of Onset    Other Mother         gall bladder    Heart Attack Father 52        x3-4    Other Brother         congenital calcium deposits n bones    Cancer Maternal Aunt         brain    Cancer Maternal Grandmother     Cancer Maternal Grandfather     Asthma Paternal Grandfather     Other Maternal Aunt         lung issues    Hearing Loss Brother         congenital       Social History     Socioeconomic History    Marital status: Single     Spouse name: Not on file    Number of children: Not on file    Years of education: Not on file    Highest education level: Not on file   Occupational History    Occupation: FACTORY IN PAST   Social Needs    Financial resource strain: Not on file    Food insecurity:     Worry: Not on file     Inability: Not on file    Transportation needs:     Medical: No     Non-medical: No   Tobacco Use    Smoking status: Former Smoker     Packs/day: 1.00     Years: 30.00     Pack years: 30.00     Types: Cigarettes     Start date: 1988     Last attempt to quit: 2019     Years since quittin.3    Smokeless tobacco: Never Used    Tobacco comment: 1 PPD (QUIT [id number])   Substance and Sexual Activity    Alcohol use: No    Drug use: Yes     Types: OTHER NON-PLAIN FILM IMAGES SUCH AS CT, ULTRASOUND AND MRI HAVE BEEN READ BY THE RADIOLOGIST. XR CHEST STANDARD (2 VW)   Final Result      No acute cardiopulmonary disease. LABS  Labs Reviewed   BORDETELLA PERTUSSIS / PARAPERTUSSIS PCR     MEDICAL DECISION MAKING     Patient reports improvement subjectively of his shortness of breath and cough with the breathing treatment given in the ER. Advised return immediately for new or worsening symptoms. I estimate there is LOW risk for ACUTE CORONARY SYNDROME, CHRONIC OBSTRUCTIVE PULMONARY DISEASE, CONGESTIVE HEART FAILURE, PERICARDIAL TAMPONADE, PNEUMONIA, PNEUMOTHORAX, PULMONARY EMBOLISM, SEPSIS, and THORACIC DISSECTION,  thus I consider the discharge disposition reasonable. Shelia Santiago and I have discussed the diagnosis and risks, and we agree with discharging home to follow-up with their primary doctor. We also discussed returning to the Emergency Department immediately if new or worsening symptoms occur. We have discussed the symptoms which are most concerning (e.g., bloody sputum, fever, worsening pain or shortness of breath, vomiting) that necessitate immediate return. CLINICAL IMPRESSION:  1. Bronchitis    2. Cough    3. Reactive airway disease without complication, unspecified asthma severity, unspecified whether persistent        BP (!) 154/90   Pulse 63   Temp 98.9 °F (37.2 °C) (Oral)   Resp 20   Ht 6' (1.829 m)   Wt 220 lb 7.4 oz (100 kg)   SpO2 95%   BMI 29.90 kg/m²         Patient was given scripts for the following medications. I counseled patient how to take these medications.   Discharge Medication List as of 9/20/2019  2:33 PM      START taking these medications    Details   azithromycin (ZITHROMAX) 250 MG tablet Per package instructions, Disp-1 packet, R-0Print      !! benzonatate (TESSALON PERLES) 100 MG capsule Take 1 capsule by mouth 3 times daily as needed for Cough, Disp-30 capsule, R-0Print      !! benzonatate

## 2019-09-20 NOTE — ED NOTES
Dry cough for last several months, worse over the last week. Cough became productive over the last week with pt reporting expelling clear mucous. Pt also states that since around August 21 he has been experiencing some shortness of breath, especially at night. Pt denies N/V/D, no fevers.       [address], RN  09/20/19 5143

## 2019-10-03 ENCOUNTER — TELEPHONE (OUTPATIENT)
Dept: FAMILY MEDICINE CLINIC | Age: 49
End: 2019-10-03

## 2019-10-03 RX ORDER — ALBUTEROL SULFATE 90 UG/1
2 AEROSOL, METERED RESPIRATORY (INHALATION) EVERY 4 HOURS PRN
Qty: 1 INHALER | Refills: 0 | Status: SHIPPED | OUTPATIENT
Start: 2019-10-03 | End: 2019-10-30 | Stop reason: SDUPTHER

## 2019-10-09 ENCOUNTER — OFFICE VISIT (OUTPATIENT)
Dept: FAMILY MEDICINE CLINIC | Age: 49
End: 2019-10-09
Payer: MEDICARE

## 2019-10-09 VITALS
TEMPERATURE: 98.6 F | HEART RATE: 78 BPM | SYSTOLIC BLOOD PRESSURE: 128 MMHG | HEIGHT: 72 IN | DIASTOLIC BLOOD PRESSURE: 78 MMHG | RESPIRATION RATE: 14 BRPM | WEIGHT: 222.2 LBS | BODY MASS INDEX: 30.1 KG/M2

## 2019-10-09 DIAGNOSIS — J45.901 MODERATE ASTHMA WITH ACUTE EXACERBATION, UNSPECIFIED WHETHER PERSISTENT: Primary | ICD-10-CM

## 2019-10-09 PROCEDURE — 99214 OFFICE O/P EST MOD 30 MIN: CPT | Performed by: FAMILY MEDICINE

## 2019-10-09 PROCEDURE — 1036F TOBACCO NON-USER: CPT | Performed by: FAMILY MEDICINE

## 2019-10-09 PROCEDURE — G8417 CALC BMI ABV UP PARAM F/U: HCPCS | Performed by: FAMILY MEDICINE

## 2019-10-09 PROCEDURE — 94640 AIRWAY INHALATION TREATMENT: CPT | Performed by: FAMILY MEDICINE

## 2019-10-09 PROCEDURE — G8427 DOCREV CUR MEDS BY ELIG CLIN: HCPCS | Performed by: FAMILY MEDICINE

## 2019-10-09 PROCEDURE — G8484 FLU IMMUNIZE NO ADMIN: HCPCS | Performed by: FAMILY MEDICINE

## 2019-10-09 RX ORDER — BUDESONIDE AND FORMOTEROL FUMARATE DIHYDRATE 160; 4.5 UG/1; UG/1
2 AEROSOL RESPIRATORY (INHALATION) 2 TIMES DAILY
Qty: 1 INHALER | Refills: 3 | Status: SHIPPED | OUTPATIENT
Start: 2019-10-09 | End: 2020-05-05 | Stop reason: SDUPTHER

## 2019-10-09 RX ORDER — IPRATROPIUM BROMIDE AND ALBUTEROL SULFATE 2.5; .5 MG/3ML; MG/3ML
1 SOLUTION RESPIRATORY (INHALATION) ONCE
Status: COMPLETED | OUTPATIENT
Start: 2019-10-09 | End: 2019-10-09

## 2019-10-09 RX ORDER — PROMETHAZINE HYDROCHLORIDE 6.25 MG/5ML
6.25 SYRUP ORAL 4 TIMES DAILY PRN
Qty: 240 ML | Refills: 0 | Status: SHIPPED | OUTPATIENT
Start: 2019-10-09 | End: 2019-10-21

## 2019-10-09 RX ADMIN — IPRATROPIUM BROMIDE AND ALBUTEROL SULFATE 1 AMPULE: 2.5; .5 SOLUTION RESPIRATORY (INHALATION) at 07:40

## 2019-10-09 ASSESSMENT — ENCOUNTER SYMPTOMS
WHEEZING: 1
SHORTNESS OF BREATH: 1
ABDOMINAL PAIN: 0
COUGH: 1
VOMITING: 0
DIARRHEA: 0

## 2019-10-15 LAB
B. PERTUSSIS/PARAPERTUSSIS SOURCE: NORMAL
BORDETELLA PARAPERTUSSIS PCR: NORMAL
BORDETELLA PERTUSSIS PCR: NORMAL

## 2019-10-30 ENCOUNTER — OFFICE VISIT (OUTPATIENT)
Dept: FAMILY MEDICINE CLINIC | Age: 49
End: 2019-10-30
Payer: MEDICARE

## 2019-10-30 VITALS
OXYGEN SATURATION: 98 % | RESPIRATION RATE: 16 BRPM | HEART RATE: 85 BPM | HEIGHT: 72 IN | WEIGHT: 228.4 LBS | SYSTOLIC BLOOD PRESSURE: 130 MMHG | DIASTOLIC BLOOD PRESSURE: 78 MMHG | BODY MASS INDEX: 30.94 KG/M2

## 2019-10-30 DIAGNOSIS — K22.10 ULCER OF ESOPHAGUS WITHOUT BLEEDING: ICD-10-CM

## 2019-10-30 DIAGNOSIS — D50.0 IRON DEFICIENCY ANEMIA DUE TO CHRONIC BLOOD LOSS: ICD-10-CM

## 2019-10-30 DIAGNOSIS — F41.9 ANXIETY: Chronic | ICD-10-CM

## 2019-10-30 DIAGNOSIS — J45.901 MODERATE ASTHMA WITH ACUTE EXACERBATION, UNSPECIFIED WHETHER PERSISTENT: ICD-10-CM

## 2019-10-30 DIAGNOSIS — R07.89 OTHER CHEST PAIN: Primary | ICD-10-CM

## 2019-10-30 PROCEDURE — G8427 DOCREV CUR MEDS BY ELIG CLIN: HCPCS | Performed by: NURSE PRACTITIONER

## 2019-10-30 PROCEDURE — G8484 FLU IMMUNIZE NO ADMIN: HCPCS | Performed by: NURSE PRACTITIONER

## 2019-10-30 PROCEDURE — G8417 CALC BMI ABV UP PARAM F/U: HCPCS | Performed by: NURSE PRACTITIONER

## 2019-10-30 PROCEDURE — 99214 OFFICE O/P EST MOD 30 MIN: CPT | Performed by: NURSE PRACTITIONER

## 2019-10-30 PROCEDURE — 93000 ELECTROCARDIOGRAM COMPLETE: CPT | Performed by: NURSE PRACTITIONER

## 2019-10-30 PROCEDURE — 1036F TOBACCO NON-USER: CPT | Performed by: NURSE PRACTITIONER

## 2019-10-30 RX ORDER — LORAZEPAM 0.5 MG/1
0.5 TABLET ORAL EVERY 6 HOURS PRN
COMMUNITY
End: 2019-10-30 | Stop reason: SDUPTHER

## 2019-10-30 RX ORDER — ALBUTEROL SULFATE 90 UG/1
2 AEROSOL, METERED RESPIRATORY (INHALATION) EVERY 4 HOURS PRN
Qty: 1 INHALER | Refills: 5 | Status: SHIPPED | OUTPATIENT
Start: 2019-10-30 | End: 2020-10-06 | Stop reason: SDUPTHER

## 2019-10-30 RX ORDER — LORAZEPAM 0.5 MG/1
0.5 TABLET ORAL EVERY 6 HOURS PRN
Qty: 45 TABLET | Refills: 2 | Status: SHIPPED | OUTPATIENT
Start: 2019-10-30 | End: 2019-11-29

## 2019-10-30 RX ORDER — SUCRALFATE 1 G/1
1 TABLET ORAL 4 TIMES DAILY
Qty: 120 TABLET | Refills: 3 | Status: SHIPPED | OUTPATIENT
Start: 2019-10-30 | End: 2020-05-05 | Stop reason: ALTCHOICE

## 2019-10-30 RX ORDER — LISINOPRIL 20 MG/1
TABLET ORAL
Refills: 2 | COMMUNITY
Start: 2019-10-10 | End: 2019-12-09 | Stop reason: SDUPTHER

## 2019-10-31 PROBLEM — D50.0 IRON DEFICIENCY ANEMIA DUE TO CHRONIC BLOOD LOSS: Status: ACTIVE | Noted: 2019-10-31

## 2019-12-09 RX ORDER — LISINOPRIL 20 MG/1
TABLET ORAL
Qty: 30 TABLET | Refills: 1 | Status: SHIPPED | OUTPATIENT
Start: 2019-12-09 | End: 2020-04-06 | Stop reason: SDUPTHER

## 2020-01-06 ENCOUNTER — HOSPITAL ENCOUNTER (INPATIENT)
Age: 50
LOS: 2 days | Discharge: HOME | DRG: 242 | End: 2020-01-08
Attending: EMERGENCY MEDICINE | Admitting: INTERNAL MEDICINE
Payer: MEDICARE

## 2020-01-06 ENCOUNTER — APPOINTMENT (OUTPATIENT)
Dept: GENERAL RADIOLOGY | Age: 50
DRG: 242 | End: 2020-01-06
Payer: MEDICARE

## 2020-01-06 LAB
A/G RATIO: 1.7 (ref 1.1–2.2)
ABO/RH: NORMAL
ALBUMIN SERPL-MCNC: 4.5 G/DL (ref 3.4–5)
ALP BLD-CCNC: 67 U/L (ref 40–129)
ALT SERPL-CCNC: 41 U/L (ref 10–40)
ANION GAP SERPL CALCULATED.3IONS-SCNC: 14 MMOL/L (ref 3–16)
ANTIBODY SCREEN: NORMAL
AST SERPL-CCNC: 19 U/L (ref 15–37)
BASOPHILS ABSOLUTE: 0.1 K/UL (ref 0–0.2)
BASOPHILS RELATIVE PERCENT: 0.3 %
BILIRUB SERPL-MCNC: <0.2 MG/DL (ref 0–1)
BUN BLDV-MCNC: 35 MG/DL (ref 7–20)
CALCIUM SERPL-MCNC: 8.9 MG/DL (ref 8.3–10.6)
CHLORIDE BLD-SCNC: 104 MMOL/L (ref 99–110)
CO2: 18 MMOL/L (ref 21–32)
CREAT SERPL-MCNC: 0.8 MG/DL (ref 0.9–1.3)
EKG ATRIAL RATE: 92 BPM
EKG DIAGNOSIS: NORMAL
EKG P AXIS: 37 DEGREES
EKG P-R INTERVAL: 158 MS
EKG Q-T INTERVAL: 332 MS
EKG QRS DURATION: 82 MS
EKG QTC CALCULATION (BAZETT): 410 MS
EKG R AXIS: 61 DEGREES
EKG T AXIS: 44 DEGREES
EKG VENTRICULAR RATE: 92 BPM
EOSINOPHILS ABSOLUTE: 0.4 K/UL (ref 0–0.6)
EOSINOPHILS RELATIVE PERCENT: 2.3 %
GFR AFRICAN AMERICAN: >60
GFR NON-AFRICAN AMERICAN: >60
GLOBULIN: 2.6 G/DL
GLUCOSE BLD-MCNC: 118 MG/DL (ref 70–99)
HCT VFR BLD CALC: 40.4 % (ref 40.5–52.5)
HEMOGLOBIN: 12.2 G/DL (ref 13.5–17.5)
HEMOGLOBIN: 13.2 G/DL (ref 13.5–17.5)
INR BLD: 1.08 (ref 0.86–1.14)
LYMPHOCYTES ABSOLUTE: 1.9 K/UL (ref 1–5.1)
LYMPHOCYTES RELATIVE PERCENT: 10.4 %
MCH RBC QN AUTO: 27.8 PG (ref 26–34)
MCHC RBC AUTO-ENTMCNC: 32.7 G/DL (ref 31–36)
MCV RBC AUTO: 85.1 FL (ref 80–100)
MONOCYTES ABSOLUTE: 0.9 K/UL (ref 0–1.3)
MONOCYTES RELATIVE PERCENT: 4.8 %
NEUTROPHILS ABSOLUTE: 15.3 K/UL (ref 1.7–7.7)
NEUTROPHILS RELATIVE PERCENT: 82.2 %
OCCULT BLOOD DIAGNOSTIC: ABNORMAL
PDW BLD-RTO: 15 % (ref 12.4–15.4)
PLATELET # BLD: 335 K/UL (ref 135–450)
PMV BLD AUTO: 8.7 FL (ref 5–10.5)
POTASSIUM SERPL-SCNC: 4.5 MMOL/L (ref 3.5–5.1)
PRO-BNP: 11 PG/ML (ref 0–124)
PROTHROMBIN TIME: 12.5 SEC (ref 10–13.2)
RBC # BLD: 4.75 M/UL (ref 4.2–5.9)
SODIUM BLD-SCNC: 136 MMOL/L (ref 136–145)
TOTAL PROTEIN: 7.1 G/DL (ref 6.4–8.2)
TROPONIN: <0.01 NG/ML
WBC # BLD: 18.7 K/UL (ref 4–11)

## 2020-01-06 PROCEDURE — 83880 ASSAY OF NATRIURETIC PEPTIDE: CPT

## 2020-01-06 PROCEDURE — 80053 COMPREHEN METABOLIC PANEL: CPT

## 2020-01-06 PROCEDURE — 2580000003 HC RX 258: Performed by: INTERNAL MEDICINE

## 2020-01-06 PROCEDURE — 96366 THER/PROPH/DIAG IV INF ADDON: CPT

## 2020-01-06 PROCEDURE — 85610 PROTHROMBIN TIME: CPT

## 2020-01-06 PROCEDURE — 6360000002 HC RX W HCPCS: Performed by: EMERGENCY MEDICINE

## 2020-01-06 PROCEDURE — C9113 INJ PANTOPRAZOLE SODIUM, VIA: HCPCS | Performed by: EMERGENCY MEDICINE

## 2020-01-06 PROCEDURE — 1200000000 HC SEMI PRIVATE

## 2020-01-06 PROCEDURE — 99153 MOD SED SAME PHYS/QHP EA: CPT | Performed by: INTERNAL MEDICINE

## 2020-01-06 PROCEDURE — 6360000002 HC RX W HCPCS: Performed by: INTERNAL MEDICINE

## 2020-01-06 PROCEDURE — 71046 X-RAY EXAM CHEST 2 VIEWS: CPT

## 2020-01-06 PROCEDURE — 96365 THER/PROPH/DIAG IV INF INIT: CPT

## 2020-01-06 PROCEDURE — 86850 RBC ANTIBODY SCREEN: CPT

## 2020-01-06 PROCEDURE — 6370000000 HC RX 637 (ALT 250 FOR IP): Performed by: FAMILY MEDICINE

## 2020-01-06 PROCEDURE — 93005 ELECTROCARDIOGRAM TRACING: CPT | Performed by: EMERGENCY MEDICINE

## 2020-01-06 PROCEDURE — 99285 EMERGENCY DEPT VISIT HI MDM: CPT

## 2020-01-06 PROCEDURE — 3609013800 HC EGD SUBMUCOSAL/BOTOX INJECTION: Performed by: INTERNAL MEDICINE

## 2020-01-06 PROCEDURE — 85018 HEMOGLOBIN: CPT

## 2020-01-06 PROCEDURE — 7100000011 HC PHASE II RECOVERY - ADDTL 15 MIN: Performed by: INTERNAL MEDICINE

## 2020-01-06 PROCEDURE — 36415 COLL VENOUS BLD VENIPUNCTURE: CPT

## 2020-01-06 PROCEDURE — 84484 ASSAY OF TROPONIN QUANT: CPT

## 2020-01-06 PROCEDURE — G0328 FECAL BLOOD SCRN IMMUNOASSAY: HCPCS

## 2020-01-06 PROCEDURE — 6370000000 HC RX 637 (ALT 250 FOR IP)

## 2020-01-06 PROCEDURE — 3E0G8GC INTRODUCTION OF OTHER THERAPEUTIC SUBSTANCE INTO UPPER GI, VIA NATURAL OR ARTIFICIAL OPENING ENDOSCOPIC: ICD-10-PCS | Performed by: INTERNAL MEDICINE

## 2020-01-06 PROCEDURE — 85025 COMPLETE CBC W/AUTO DIFF WBC: CPT

## 2020-01-06 PROCEDURE — 93010 ELECTROCARDIOGRAM REPORT: CPT | Performed by: INTERNAL MEDICINE

## 2020-01-06 PROCEDURE — 86900 BLOOD TYPING SEROLOGIC ABO: CPT

## 2020-01-06 PROCEDURE — 86901 BLOOD TYPING SEROLOGIC RH(D): CPT

## 2020-01-06 PROCEDURE — 99152 MOD SED SAME PHYS/QHP 5/>YRS: CPT | Performed by: INTERNAL MEDICINE

## 2020-01-06 PROCEDURE — 3609016200 HC ESOPHAGOSCOPY CONTROL HEMORRHAGE: Performed by: INTERNAL MEDICINE

## 2020-01-06 PROCEDURE — 6370000000 HC RX 637 (ALT 250 FOR IP): Performed by: INTERNAL MEDICINE

## 2020-01-06 PROCEDURE — 7100000010 HC PHASE II RECOVERY - FIRST 15 MIN: Performed by: INTERNAL MEDICINE

## 2020-01-06 PROCEDURE — 2580000003 HC RX 258: Performed by: EMERGENCY MEDICINE

## 2020-01-06 PROCEDURE — 2709999900 HC NON-CHARGEABLE SUPPLY: Performed by: INTERNAL MEDICINE

## 2020-01-06 RX ORDER — FENTANYL CITRATE 50 UG/ML
INJECTION, SOLUTION INTRAMUSCULAR; INTRAVENOUS
Status: COMPLETED | OUTPATIENT
Start: 2020-01-06 | End: 2020-01-06

## 2020-01-06 RX ORDER — MIDAZOLAM HYDROCHLORIDE 1 MG/ML
INJECTION INTRAMUSCULAR; INTRAVENOUS
Status: COMPLETED | OUTPATIENT
Start: 2020-01-06 | End: 2020-01-06

## 2020-01-06 RX ORDER — ACETAMINOPHEN 325 MG/1
650 TABLET ORAL EVERY 4 HOURS PRN
Status: DISCONTINUED | OUTPATIENT
Start: 2020-01-06 | End: 2020-01-08 | Stop reason: HOSPADM

## 2020-01-06 RX ORDER — SODIUM CHLORIDE 9 MG/ML
INJECTION, SOLUTION INTRAVENOUS CONTINUOUS
Status: DISCONTINUED | OUTPATIENT
Start: 2020-01-06 | End: 2020-01-07

## 2020-01-06 RX ORDER — SUCRALFATE 1 G/1
1 TABLET ORAL
Status: DISCONTINUED | OUTPATIENT
Start: 2020-01-06 | End: 2020-01-08 | Stop reason: HOSPADM

## 2020-01-06 RX ORDER — ACETAMINOPHEN 325 MG/1
TABLET ORAL
Status: COMPLETED
Start: 2020-01-06 | End: 2020-01-06

## 2020-01-06 RX ORDER — ONDANSETRON 2 MG/ML
4 INJECTION INTRAMUSCULAR; INTRAVENOUS EVERY 6 HOURS PRN
Status: DISCONTINUED | OUTPATIENT
Start: 2020-01-06 | End: 2020-01-08 | Stop reason: HOSPADM

## 2020-01-06 RX ORDER — ALBUTEROL SULFATE 90 UG/1
2 AEROSOL, METERED RESPIRATORY (INHALATION) EVERY 4 HOURS PRN
Status: DISCONTINUED | OUTPATIENT
Start: 2020-01-06 | End: 2020-01-08 | Stop reason: HOSPADM

## 2020-01-06 RX ORDER — SODIUM CHLORIDE 0.9 % (FLUSH) 0.9 %
10 SYRINGE (ML) INJECTION EVERY 12 HOURS SCHEDULED
Status: DISCONTINUED | OUTPATIENT
Start: 2020-01-06 | End: 2020-01-08 | Stop reason: HOSPADM

## 2020-01-06 RX ORDER — SODIUM CHLORIDE 0.9 % (FLUSH) 0.9 %
10 SYRINGE (ML) INJECTION PRN
Status: DISCONTINUED | OUTPATIENT
Start: 2020-01-06 | End: 2020-01-08 | Stop reason: HOSPADM

## 2020-01-06 RX ORDER — DIPHENHYDRAMINE HYDROCHLORIDE 50 MG/ML
INJECTION INTRAMUSCULAR; INTRAVENOUS
Status: COMPLETED | OUTPATIENT
Start: 2020-01-06 | End: 2020-01-06

## 2020-01-06 RX ORDER — HYDROCODONE BITARTRATE AND ACETAMINOPHEN 5; 325 MG/1; MG/1
1 TABLET ORAL EVERY 6 HOURS PRN
Status: DISCONTINUED | OUTPATIENT
Start: 2020-01-06 | End: 2020-01-08 | Stop reason: HOSPADM

## 2020-01-06 RX ADMIN — ACETAMINOPHEN 650 MG: 325 TABLET ORAL at 16:11

## 2020-01-06 RX ADMIN — SODIUM CHLORIDE: 9 INJECTION, SOLUTION INTRAVENOUS at 20:40

## 2020-01-06 RX ADMIN — SUCRALFATE 1 G: 1 TABLET ORAL at 20:40

## 2020-01-06 RX ADMIN — Medication 10 ML: at 20:40

## 2020-01-06 RX ADMIN — SODIUM CHLORIDE 8 MG/HR: 9 INJECTION, SOLUTION INTRAVENOUS at 18:44

## 2020-01-06 RX ADMIN — HYDROCODONE BITARTRATE AND ACETAMINOPHEN 1 TABLET: 5; 325 TABLET ORAL at 19:40

## 2020-01-06 RX ADMIN — METOPROLOL TARTRATE 25 MG: 25 TABLET ORAL at 20:40

## 2020-01-06 RX ADMIN — SODIUM CHLORIDE 8 MG/HR: 9 INJECTION, SOLUTION INTRAVENOUS at 09:15

## 2020-01-06 RX ADMIN — SODIUM CHLORIDE 80 MG: 9 INJECTION, SOLUTION INTRAVENOUS at 08:26

## 2020-01-06 ASSESSMENT — PAIN DESCRIPTION - PAIN TYPE
TYPE: ACUTE PAIN

## 2020-01-06 ASSESSMENT — PAIN DESCRIPTION - PROGRESSION
CLINICAL_PROGRESSION: GRADUALLY WORSENING
CLINICAL_PROGRESSION: GRADUALLY WORSENING
CLINICAL_PROGRESSION: NOT CHANGED
CLINICAL_PROGRESSION: GRADUALLY IMPROVING
CLINICAL_PROGRESSION: GRADUALLY WORSENING
CLINICAL_PROGRESSION: NOT CHANGED
CLINICAL_PROGRESSION: GRADUALLY WORSENING
CLINICAL_PROGRESSION: GRADUALLY WORSENING

## 2020-01-06 ASSESSMENT — PAIN DESCRIPTION - LOCATION
LOCATION: HEAD
LOCATION: THROAT
LOCATION: HEAD
LOCATION: THROAT
LOCATION: HEAD

## 2020-01-06 ASSESSMENT — PAIN SCALES - GENERAL
PAINLEVEL_OUTOF10: 10
PAINLEVEL_OUTOF10: 0
PAINLEVEL_OUTOF10: 5
PAINLEVEL_OUTOF10: 8
PAINLEVEL_OUTOF10: 3
PAINLEVEL_OUTOF10: 2
PAINLEVEL_OUTOF10: 0
PAINLEVEL_OUTOF10: 2
PAINLEVEL_OUTOF10: 4
PAINLEVEL_OUTOF10: 8
PAINLEVEL_OUTOF10: 5

## 2020-01-06 ASSESSMENT — PAIN DESCRIPTION - DESCRIPTORS
DESCRIPTORS: ACHING
DESCRIPTORS: DISCOMFORT
DESCRIPTORS: ACHING
DESCRIPTORS: DISCOMFORT
DESCRIPTORS: ACHING

## 2020-01-06 ASSESSMENT — PAIN DESCRIPTION - ORIENTATION
ORIENTATION: ANTERIOR

## 2020-01-06 ASSESSMENT — PAIN DESCRIPTION - FREQUENCY
FREQUENCY: CONTINUOUS

## 2020-01-06 ASSESSMENT — PAIN - FUNCTIONAL ASSESSMENT
PAIN_FUNCTIONAL_ASSESSMENT: 0-10
PAIN_FUNCTIONAL_ASSESSMENT: ACTIVITIES ARE NOT PREVENTED
PAIN_FUNCTIONAL_ASSESSMENT: ACTIVITIES ARE NOT PREVENTED
PAIN_FUNCTIONAL_ASSESSMENT: 0-10
PAIN_FUNCTIONAL_ASSESSMENT: PREVENTS OR INTERFERES SOME ACTIVE ACTIVITIES AND ADLS
PAIN_FUNCTIONAL_ASSESSMENT: ACTIVITIES ARE NOT PREVENTED

## 2020-01-06 ASSESSMENT — PAIN DESCRIPTION - ONSET
ONSET: ON-GOING

## 2020-01-06 NOTE — CONSULTS
[address] [facility]  GI Consult Note    Patient: Diandra Desai  : 1970  Acct#:      Date:  2020    Subjective:       History of Present Illness  Patient is a 51 y.o. male admitted with GI bleed [K92.2] who is seen in consult for melena. 18-year-old with a history of hypertension, smoking, anxiety who presents with melena. Dr. Barton had done an upper endoscopy and a colonoscopy in  that showed esophagitis, duodenal bulb ulcers, negative biopsies for H. pylori, 5 cm of Parar's esophagus, [place] grade D erosive esophagitis, 6 colon polyps with none larger than 5 mm, hemorrhoids. He presented with melena and acute blood loss anemia 2019 in Dr. Dipti Wilkinson performed upper endoscopy that showed erosive esophagitis with multiple ulcerations in the midesophagus. There was a large 2 cm ulcer with adherent clot in the distal esophagus. There are multiple nonbleeding duodenal erosions. Repeat upper endoscopy 2019 showed a 6 cm sliding hiatal hernia, 8 cm of Parra's esophagus, a healing ulcer in the distal esophagus, and a 1 cm distal esophageal ulcer biopsied. Repeat endoscopy recommended in 12 weeks. Proton pump inhibitor was increased to twice daily. He stopped his bid PPI 2 weeks ago. Threw it out instead of refilling it. He is not sure why. Has been taking bert seltzer. Over the past 2 weeks as developed worsening heartburn, reflux, and burning discomfort in the upper abdomen. He does get some free reflux of coffee-ground material.  Last night he developed melena with 3 episodes of black tarry stool. No unexplained weight loss. Denies alcohol. No aspirin or NSAIDs. No dysphagia.       Past Medical History:   Diagnosis Date    Anxiety     Former smoker 2019    started age 17, quit age 53    Fracture of finger of right hand     And knuckle    History of blood transfusion     new this admission 6/10/19    Hypertension     Iron deficiency anemia due to chronic since quittin.6    Smokeless tobacco: Never Used    Tobacco comment: 1 PPD (QUIT [id number])   Substance Use Topics    Alcohol use: No        Family History   Problem Relation Age of Onset    Other Mother         gall bladder    Heart Attack Father 52        x3-4    Other Brother         congenital calcium deposits n bones    Cancer Maternal Aunt         brain    Cancer Maternal Grandmother     Cancer Maternal Grandfather     Asthma Paternal Grandfather     Other Maternal Aunt         lung issues    Hearing Loss Brother         congenital          Review of systems: +melena and heartburn and dyspesia and coffee-ground emesis  Denies fever, weight loss, rash, jaundice, chest pain, palpitations, shortness of breath, cough, diarrhea, constipation, hematochezia, dysuria, hematuria, lower extremity edema. Physical Exam  Blood pressure (!) 139/90, pulse 99, temperature 99 °F (37.2 °C), temperature source Oral, resp. rate 20, weight 233 lb 4 oz (105.8 kg), SpO2 98 %. General appearance: alert, cooperative, no distress  Anicteric, No Jaundice  Head: Normocephalic, without obvious abnormality  Lungs: clear to auscultation bilaterally, Normal Effort  Heart: regular rate and rhythm, no murmurs   Abdomen: soft, non-tender. Bowel sounds normal, non-distended  Extremities: No edema  Skin: warm and dry  Neuro: No tremor  Psych: A&OX3      Data Review:    Recent Labs     20  0755   WBC 18.7*   HGB 13.2*   HCT 40.4*   MCV 85.1        Recent Labs     20  0755      K 4.5      CO2 18*   BUN 35*   CREATININE 0.8*     Recent Labs     20  0755   AST 19   ALT 41*   BILITOT <0.2   ALKPHOS 67     No results for input(s): LIPASE, AMYLASE in the last 72 hours. Recent Labs     20  0755   PROTIME 12.5   INR 1.08     No results for input(s): PTT in the last 72 hours. No results for input(s): OCCULTBLD in the last 72 hours.

## 2020-01-06 NOTE — PROGRESS NOTES
Patient brought from   Endoscopy post procedure to await bed being cleaned. Alert. Denies discomfort. Rosa clear liquids well.

## 2020-01-06 NOTE — ED PROVIDER NOTES
[facility]  eMERGENCY dEPARTMENT eNCOUnter      Pt Name: Gautam Bonilla  MRN: [id number]  [place] 1970  Date of evaluation: 1/6/2020  Provider: Mary Tay MD    [address]       Chief Complaint   Patient presents with    Melena     thinks esphageal ulcer may be bleeding again. Also has 5 stomach ulcers. started this morning    Hemoptysis     started this morning          CRITICAL CARE TIME   Total Critical Care time was 0 minutes, excluding separately reportable procedures. There was a high probability of clinically significant/life threatening deterioration in the patient's condition which required my urgent intervention. HISTORY OF PRESENT ILLNESS  (Location/Symptom, Timing/Onset, Context/Setting, Quality, Duration, Modifying Factors, Severity.)   Gautam Bonilla is a 51 y.o. male who presents to the emergency department complaining of onset of black stool x3 this morning. No abdominal pain. He has a history of esophageal varices and stomach ulcers. He also states around the same time at 3 AM he coughed up some bright red blood. No dizziness. No passing out. He is not on iron supplement. Nursing Notes were reviewed and I agree. REVIEW OF SYSTEMS    (2-9 systems for level 4, 10 or more for level 5)     General: No fever or chills. ENT: No nasal congestion sore throat or earache. No nosebleed. Cardiovascular: No chest pain. Pulmonary: No shortness of breath. He states he coughed up some bright red blood this morning. GI: No abdominal pain. 3 black stools since 3 AM.  No blood in his stool. Neuro: No dizziness or passing out. Except as noted above the remainder of the review of systems was reviewed and negative.        PAST MEDICAL HISTORY     Past Medical History:   Diagnosis Date    Anxiety     Former smoker 05/2019    started age 17, quit age 53    Fracture of finger of right hand     And knuckle    History of Asthma Paternal Grandfather     Other Maternal Aunt         lung issues    Hearing Loss Brother         congenital          SOCIAL HISTORY       Social History     Socioeconomic History    Marital status: Single     Spouse name: None    Number of children: None    Years of education: None    Highest education level: None   Occupational History    Occupation: FACTORY IN PAST   Social Needs    Financial resource strain: None    Food insecurity:     Worry: None     Inability: None    Transportation needs:     Medical: No     Non-medical: No   Tobacco Use    Smoking status: Former Smoker     Packs/day: 1.00     Years: 30.00     Pack years: 30.00     Types: Cigarettes     Start date: 1988     Last attempt to quit: 2019     Years since quittin.6    Smokeless tobacco: Never Used    Tobacco comment: 1 PPD (QUIT [id number])   Substance and Sexual Activity    Alcohol use: No    Drug use: Yes     Types: Marijuana    Sexual activity: Yes   Lifestyle    Physical activity:     Days per week: None     Minutes per session: None    Stress: None   Relationships    Social connections:     Talks on phone: None     Gets together: None     Attends Jehovah's witness service: None     Active member of club or organization: None     Attends meetings of clubs or organizations: None     Relationship status: None    Intimate partner violence:     Fear of current or ex partner: None     Emotionally abused: None     Physically abused: None     Forced sexual activity: None   Other Topics Concern    None   Social History Narrative    Exercise - too light headed. PHYSICAL EXAM    (up to 7 for level 4, 8 or more for level 5)     ED Triage Vitals [20 0735]   BP Temp Temp Source Pulse Resp SpO2 Height Weight   (!) 145/85 99 °F (37.2 °C) Oral 89 16 97 % -- 233 lb 4 oz (105.8 kg)       General: Alert white male in no acute distress. Head: Atraumatic and normocephalic. Eyes: No conjunctival injection.   Pupils METABOLIC PANEL - Abnormal; Notable for the following components:    CO2 18 (*)     Glucose 118 (*)     BUN 35 (*)     CREATININE 0.8 (*)     ALT 41 (*)     All other components within normal limits    Narrative:     Performed at:  [facility] [address]   Phone [phone]   BLOOD OCCULT STOOL DIAGNOSTIC - Abnormal; Notable for the following components:    Occult Blood Diagnostic   (*)     Value: Result: POSITIVE  Normal range: Negative      All other components within normal limits    Narrative:     ORDER#: [id number]                          ORDERED BY: Yohana Figueroa  SOURCE: Stool                              COLLECTED:  01/06/20 07:55  ANTIBIOTICS AT RAKESH.:                      RECEIVED :  01/06/20 08:08  Performed at:  [address] [address]   Phone [phone]   PROTIME-INR    Narrative:     Performed at:  [facility]  [address] [place] [address] [facility] [address]   Phone [phone]   BRAIN NATRIURETIC PEPTIDE    Narrative:     Performed at:  [facility]  [address] [place] [address] [facility] [address]   Phone [phone]   TROPONIN    Narrative:     Performed at:  [address] [address]   Phone [phone]   TYPE AND SCREEN    Narrative:     Performed at:  [address] [facility] [address]   Phone [phone]       All other labs were within normal range or not returned as of this dictation. EMERGENCY DEPARTMENT COURSE and DIFFERENTIAL DIAGNOSIS/MDM:   Vitals:    Vitals:    01/06/20 0806 01/06/20 0823 01/06/20 0835 01/06/20 0906   BP: (!) 153/89 (!) 144/79  (!) 139/90   Pulse: 91 95 96 99   Resp: 15 15 15 20   Temp:       TempSrc:       SpO2: 98% 98% 95% 98%   Weight:            Old records reviewed. EGD note from 6/11/2019 shows evidence of erosive esophagitis with a 2 cm ulcer in the distal esophagus with a clot. No active bleeding. They do not mention any esophageal varices as reported by the patient. This patient's had 3 episodes of black stool. He has no abdominal pain. His abdomen is benign on exam.  His hemoglobin is stable at 13.2. His BUN is elevated at 35. His stool is Hemoccult positive. He has a previous history of bleeding from an esophageal ulcer in June of last year. He was bolused with Protonix. GI was consulted, I spoke with Dr. Hernan Leija. He will see the patient. The hospitalist was consulted for admission. The test results, diagnosis, and treatment plan were discussed with the patient. Patient understands the treatment plan and need for admission and is agreeable. CONSULTS:  IP CONSULT TO GI  IP CONSULT TO HOSPITALIST    PROCEDURES:  None    FINAL IMPRESSION      1. UGI bleed          DISPOSITION/PLAN   DISPOSITION        PATIENT REFERRED TO:  No follow-up provider specified.     DISCHARGE MEDICATIONS:  New Prescriptions    No medications on file       (Please note that portions of this note were completed with a voice recognition program.  Efforts were made to edit the dictations but occasionally words are mis-transcribed.)    Rhea Alfonso MD  Attending Emergency Physician        Xavier Hawley MD  01/06/20 8559

## 2020-01-06 NOTE — OP NOTE
Recommendations:   1. Clear liquid diet  2. Continue pantoprazole drip. 3.  Will follow.     Jake Carter MD  [address] [facility]

## 2020-01-06 NOTE — H&P
Hospital Medicine History & Physical      PCP: Bo Mendoza, APRN - CNP    Date of Admission: 1/6/2020    Date of Service: Pt seen/examined on 1/6/2020    Chief Complaint:      Chief Complaint   Patient presents with    Melena     thinks esphageal ulcer may be bleeding again. Also has 5 stomach ulcers. started this morning    Hemoptysis     started this morning        History Of Present Illness: The patient is a 51 y.o. male with a past medical history of hypertension, tobacco use who presents to [facility] with tarry stool. Pt has been having some chest discomfort/ burning for 10 days, had bloating and fullness for 1 day . Ran out of Protonix for > 2 weeks, did not refill it  . Since am had 1 episode of ? hemoptysis with 3large black tarry stool. Patient has had similar episodes in the past and work-up revealed erosive esophagitis with esophageal ulcers. Patient was discharged on PPI and sucralfate. Past Medical History:        Diagnosis Date    Anxiety     Former smoker 05/2019    started age 17, quit age 53    Fracture of finger of right hand     And knuckle    History of blood transfusion     new this admission 6/10/19    Hypertension     Iron deficiency anemia due to chronic blood loss 10/31/2019    Peptic ulcer 2015       Past Surgical History:        Procedure Laterality Date    COLONOSCOPY  03/2015    1 benign polyp    ENDOSCOPY, COLON, DIAGNOSTIC      UPPER GASTROINTESTINAL ENDOSCOPY  3/2915    3 ulcers - peptic and esophageal    UPPER GASTROINTESTINAL ENDOSCOPY  06/2019    +ulcers    UPPER GASTROINTESTINAL ENDOSCOPY N/A 6/11/2019    EGD DIAGNOSTIC ONLY performed by Karen Blair MD at [address] [place] [address],[address] Floor  08/19/2019    Dr. Leida Escobedo, ulcer healing in esophagitis, Parra's 1    WISDOM TOOTH EXTRACTION         Medications Prior to Admission:    Prior to Admission medications    Medication Sig Start Date End Date Taking?  Authorizing Provider   lisinopril (PRINIVIL;ZESTRIL) 20 MG tablet TAKE 1 2 (ONE HALF) TABLET BY MOUTH ONCE DAILY 12/9/19   ÓSCAR Price CNP   albuterol sulfate HFA (PROVENTIL HFA) 108 (90 Base) MCG/ACT inhaler Inhale 2 puffs into the lungs every 4 hours as needed for Wheezing or Shortness of Breath Please provide pt c spacer as well 10/30/19 10/29/20  ÓSCAR Price CNP   sucralfate (CARAFATE) 1 GM tablet Take 1 tablet by mouth 4 times daily 10/30/19   ÓSCAR Price CNP   budesonide-formoterol [facility]) 160-4.5 MCG/ACT AERO Inhale 2 puffs into the lungs 2 times daily 10/9/19   Gricelda Askew,    metoprolol tartrate (LOPRESSOR) 25 MG tablet TAKE 1 TABLET BY MOUTH TWICE DAILY 9/12/19   Gricelda Askew, DO   pantoprazole (PROTONIX) 40 MG tablet Take 1 tablet by mouth 2 times daily 9/9/19   Gricelda Askew, DO   ferrous sulfate 325 (65 Fe) MG tablet Take 1 tablet by mouth daily (with breakfast) 9/9/19   Gricelda Askew, DO   cetirizine (ZYRTEC) 10 MG tablet Take 1 tablet by mouth daily 9/9/19   Gricelda Askew, DO       Allergies:  Patient has no known allergies. Social History:       reports that he quit smoking about 8 months ago. His smoking use included cigarettes. He started smoking about 32 years ago. He has a 30.00 pack-year smoking history. He has never used smokeless tobacco. He reports current drug use. Drug: Marijuana. He reports that he does not drink alcohol. Family History:  Reviewed in detail and negative for DM, Early CAD, Cancer, CVA.  Positive as follows:        Problem Relation Age of Onset    Other Mother         gall bladder    Heart Attack Father 52        x3-4    Other Brother         congenital calcium deposits n bones    Cancer Maternal Aunt         brain    Cancer Maternal Grandmother     Cancer Maternal Grandfather     Asthma Paternal Grandfather     Other Maternal Aunt         lung issues    Hearing Loss Brother congenital       REVIEW OF SYSTEMS:   Positive review  noted in the HPI. All other systems reviewed and negative.     PHYSICAL EXAM:    BP (!) 139/90   Pulse 99   Temp 99 °F (37.2 °C) (Oral)   Resp 20   Wt 233 lb 4 oz (105.8 kg)   SpO2 98%   BMI 31.63 kg/m²   General Appearance: alert and oriented to person, place and time, well developed and well- nourished, in no acute distress  Skin: warm and dry, no rash or erythema  Head: normocephalic and atraumatic  Eyes: pupils equal, round, and reactive to light, extraocular eye movements intact, conjunctivae normal  ENT: tympanic membrane, external ear and ear canal normal bilaterally, nose without deformity, nasal mucosa and turbinates normal without polyps  Neck: supple and non-tender without mass, no thyromegaly or thyroid nodules, no cervical lymphadenopathy  Pulmonary/Chest: clear to auscultation bilaterally- no wheezes, rales or rhonchi, normal air movement, no respiratory distress  Cardiovascular: normal rate, regular rhythm, normal S1 and S2, no murmurs, rubs, clicks, or gallops, Peripheral pulses good, Cap refill <3 sec, no carotid bruits  Abdomen: soft, non-tender, non-distended, normal bowel sounds, no masses or organomegaly  Extremities: no cyanosis, clubbing or edema  Musculoskeletal: normal range of motion, no joint swelling, deformity or tenderness  Neurologic: reflexes normal and symmetric, no cranial nerve deficit, gait, coordination and speech normal      LABS:        CBC   Recent Labs     01/06/20  0755   WBC 18.7*   HGB 13.2*   HCT 40.4*         RENAL  Recent Labs     01/06/20  0755      K 4.5      CO2 18*   BUN 35*   CREATININE 0.8*     LFT'S  Recent Labs     01/06/20  0755   AST 19   ALT 41*   BILITOT <0.2   ALKPHOS 67     COAG  Recent Labs     01/06/20  0755   INR 1.08     CARDIAC ENZYMES  Recent Labs     01/06/20  0755   TROPONINI <0.01       U/A:    Lab Results   Component Value Date    COLORU YELLOW 05/14/2015    WBCUA 12 05/14/2015    RBCUA 6 05/14/2015    CLARITYU Clear 05/14/2015    SPECGRAV 1.022 05/14/2015    LEUKOCYTESUR SMALL 05/14/2015    BLOODU Negative 05/14/2015    GLUCOSEU Negative 05/14/2015       ABG  No results found for: AET1LUP, BEART, G8KIBHXO, PHART, THGBART, FAQ3UMQ, PO2ART, ZUL4RYF    UA:No results for input(s): NITRITE, COLORU, PHUR, LABCAST, WBCUA, RBCUA, MUCUS, TRICHOMONAS, YEAST, BACTERIA, CLARITYU, SPECGRAV, LEUKOCYTESUR, UROBILINOGEN, BILIRUBINUR, BLOODU, GLUCOSEU, KETUA, AMORPHOUS in the last 72 hours. Microbiology:  No results for input(s): LABGRAM, LABANAE, ORG, CXSURG in the last 72 hours. Nasal Culture: No results for input(s): ORG, MRSAPCR in the last 72 hours. Blood Culture: No results for input(s): BC, BLOODCULT2, ORG in the last 72 hours. Fungal Culture:   No results for input(s): FUNGSM in the last 72 hours. No results for input(s): FUNCXBLD in the last 72 hours. CSF Culture:  No results for input(s): COLORCSF, APPEARCSF, CFTUBE, CLOTCSF, WBCCSF, RBCCSF, NEUTCSF, NUMCELLSCSF, LYMPHSCSF, MONOCSF, GLUCCSF, VOLCSF in the last 72 hours. Respiratory Culture:  No results for input(s): Autumn [place] in the last 72 hours. AFB:No results for input(s): AFBSMEAR in the last 72 hours. Urine Culture  No results for input(s): LABURIN in the last 72 hours. RADIOLOGY:    XR CHEST STANDARD (2 VW)   Final Result   No acute cardiopulmonary pathology. Previous medical records personally reviewed and analyzed         PHYSICIAN CERTIFICATION    I certify that Henrik Perea is expected to be hospitalized for > 2  midnights based on the following assessment and plan:    ASSESSMENT/PLAN:  Active Hospital Problems    Diagnosis Date Noted    GI bleed [K92.2] 06/10/2019     Upper GI bleed with melena  -Patient has a history of esophageal ulcers.  -Continue Protonix drip  -GI has been consult  -Anticipated blood loss as patient is hemoconcentrated.   Continue IV fluids and monitor H&H every 6 hours  -Continue sucralfate    HTN  - ct BB  - hold lisinopril      DVT Prophylaxis: scd  Diet: No diet orders on file  Code Status: Prior      Murray Uriostegui MD  The note was completed using EMR. Every effort was made to ensure accuracy; however, inadvertent computerized transcription errors may be present. Thank you ÓSCAR Grigsby CNP for the opportunity to be involved in this patient's care. If you have any questions or concerns please feel free to contact me at [phone].

## 2020-01-06 NOTE — PROGRESS NOTES
Awaiting room assignment. Patient disgruntled and verbally abusive to staff. Does not believe no room is ready. Wishes to sign out AMA. Risks explained to patient regarding potential for further bleeding. Patient decided not to leave. Noel Cho RN , Charge RN working the entire time of this patient's stay while awaiting a bed. Patient c/o HA. Had been medicated per order near 1600 with stated relief. Now states needs to eat, clear liquids have been given and unhappy with this. Reason for diet explained. Patient demanding med for HA. Call in to hospitalist. Awaiting call back.

## 2020-01-07 LAB
ANION GAP SERPL CALCULATED.3IONS-SCNC: 13 MMOL/L (ref 3–16)
BUN BLDV-MCNC: 20 MG/DL (ref 7–20)
CALCIUM SERPL-MCNC: 8.3 MG/DL (ref 8.3–10.6)
CHLORIDE BLD-SCNC: 107 MMOL/L (ref 99–110)
CO2: 19 MMOL/L (ref 21–32)
CREAT SERPL-MCNC: 0.9 MG/DL (ref 0.9–1.3)
GFR AFRICAN AMERICAN: >60
GFR NON-AFRICAN AMERICAN: >60
GLUCOSE BLD-MCNC: 97 MG/DL (ref 70–99)
HCT VFR BLD CALC: 33.6 % (ref 40.5–52.5)
HEMOGLOBIN: 11.2 G/DL (ref 13.5–17.5)
HEMOGLOBIN: 11.5 G/DL (ref 13.5–17.5)
MCH RBC QN AUTO: 28 PG (ref 26–34)
MCHC RBC AUTO-ENTMCNC: 33.2 G/DL (ref 31–36)
MCV RBC AUTO: 84.2 FL (ref 80–100)
PDW BLD-RTO: 14.7 % (ref 12.4–15.4)
PLATELET # BLD: 268 K/UL (ref 135–450)
PMV BLD AUTO: 8.5 FL (ref 5–10.5)
POTASSIUM REFLEX MAGNESIUM: 3.9 MMOL/L (ref 3.5–5.1)
RBC # BLD: 4 M/UL (ref 4.2–5.9)
SODIUM BLD-SCNC: 139 MMOL/L (ref 136–145)
WBC # BLD: 9.3 K/UL (ref 4–11)

## 2020-01-07 PROCEDURE — 2580000003 HC RX 258: Performed by: INTERNAL MEDICINE

## 2020-01-07 PROCEDURE — 6370000000 HC RX 637 (ALT 250 FOR IP): Performed by: INTERNAL MEDICINE

## 2020-01-07 PROCEDURE — 85027 COMPLETE CBC AUTOMATED: CPT

## 2020-01-07 PROCEDURE — 94640 AIRWAY INHALATION TREATMENT: CPT

## 2020-01-07 PROCEDURE — 85018 HEMOGLOBIN: CPT

## 2020-01-07 PROCEDURE — 36415 COLL VENOUS BLD VENIPUNCTURE: CPT

## 2020-01-07 PROCEDURE — 80048 BASIC METABOLIC PNL TOTAL CA: CPT

## 2020-01-07 PROCEDURE — C9113 INJ PANTOPRAZOLE SODIUM, VIA: HCPCS | Performed by: INTERNAL MEDICINE

## 2020-01-07 PROCEDURE — 1200000000 HC SEMI PRIVATE

## 2020-01-07 PROCEDURE — 6360000002 HC RX W HCPCS: Performed by: INTERNAL MEDICINE

## 2020-01-07 PROCEDURE — 6370000000 HC RX 637 (ALT 250 FOR IP): Performed by: FAMILY MEDICINE

## 2020-01-07 PROCEDURE — 94760 N-INVAS EAR/PLS OXIMETRY 1: CPT

## 2020-01-07 RX ORDER — LORAZEPAM 0.5 MG/1
0.5 TABLET ORAL DAILY
COMMUNITY
End: 2020-04-27 | Stop reason: SDUPTHER

## 2020-01-07 RX ORDER — M-VIT,TX,IRON,MINS/CALC/FOLIC 27MG-0.4MG
1 TABLET ORAL DAILY
COMMUNITY

## 2020-01-07 RX ORDER — LORAZEPAM 0.5 MG/1
0.5 TABLET ORAL NIGHTLY
Status: DISCONTINUED | OUTPATIENT
Start: 2020-01-07 | End: 2020-01-08 | Stop reason: HOSPADM

## 2020-01-07 RX ADMIN — SODIUM CHLORIDE 8 MG/HR: 9 INJECTION, SOLUTION INTRAVENOUS at 14:10

## 2020-01-07 RX ADMIN — METOPROLOL TARTRATE 25 MG: 25 TABLET ORAL at 20:21

## 2020-01-07 RX ADMIN — ALBUTEROL SULFATE 2 PUFF: 90 AEROSOL, METERED RESPIRATORY (INHALATION) at 09:48

## 2020-01-07 RX ADMIN — SUCRALFATE 1 G: 1 TABLET ORAL at 11:51

## 2020-01-07 RX ADMIN — MOMETASONE FUROATE AND FORMOTEROL FUMARATE DIHYDRATE 2 PUFF: 100; 5 AEROSOL RESPIRATORY (INHALATION) at 21:21

## 2020-01-07 RX ADMIN — SODIUM CHLORIDE 8 MG/HR: 9 INJECTION, SOLUTION INTRAVENOUS at 03:38

## 2020-01-07 RX ADMIN — METOPROLOL TARTRATE 25 MG: 25 TABLET ORAL at 08:33

## 2020-01-07 RX ADMIN — SODIUM CHLORIDE: 9 INJECTION, SOLUTION INTRAVENOUS at 10:21

## 2020-01-07 RX ADMIN — SUCRALFATE 1 G: 1 TABLET ORAL at 16:18

## 2020-01-07 RX ADMIN — Medication 10 ML: at 20:21

## 2020-01-07 RX ADMIN — HYDROCODONE BITARTRATE AND ACETAMINOPHEN 1 TABLET: 5; 325 TABLET ORAL at 04:59

## 2020-01-07 RX ADMIN — SODIUM CHLORIDE: 9 INJECTION, SOLUTION INTRAVENOUS at 03:25

## 2020-01-07 RX ADMIN — SUCRALFATE 1 G: 1 TABLET ORAL at 20:21

## 2020-01-07 RX ADMIN — LORAZEPAM 0.5 MG: 0.5 TABLET ORAL at 20:21

## 2020-01-07 RX ADMIN — HYDROCODONE BITARTRATE AND ACETAMINOPHEN 1 TABLET: 5; 325 TABLET ORAL at 16:18

## 2020-01-07 RX ADMIN — SUCRALFATE 1 G: 1 TABLET ORAL at 06:52

## 2020-01-07 RX ADMIN — MOMETASONE FUROATE AND FORMOTEROL FUMARATE DIHYDRATE 2 PUFF: 100; 5 AEROSOL RESPIRATORY (INHALATION) at 09:48

## 2020-01-07 ASSESSMENT — PAIN SCALES - GENERAL
PAINLEVEL_OUTOF10: 6
PAINLEVEL_OUTOF10: 4
PAINLEVEL_OUTOF10: 0
PAINLEVEL_OUTOF10: 3
PAINLEVEL_OUTOF10: 0

## 2020-01-07 ASSESSMENT — PAIN DESCRIPTION - ORIENTATION: ORIENTATION: MID

## 2020-01-07 ASSESSMENT — PAIN - FUNCTIONAL ASSESSMENT
PAIN_FUNCTIONAL_ASSESSMENT: ACTIVITIES ARE NOT PREVENTED
PAIN_FUNCTIONAL_ASSESSMENT: ACTIVITIES ARE NOT PREVENTED

## 2020-01-07 ASSESSMENT — PAIN DESCRIPTION - LOCATION
LOCATION: HEAD
LOCATION: HEAD

## 2020-01-07 ASSESSMENT — PAIN DESCRIPTION - DESCRIPTORS
DESCRIPTORS: ACHING
DESCRIPTORS: ACHING;DISCOMFORT;CRUSHING

## 2020-01-07 ASSESSMENT — PAIN DESCRIPTION - PROGRESSION: CLINICAL_PROGRESSION: GRADUALLY WORSENING

## 2020-01-07 ASSESSMENT — PAIN DESCRIPTION - PAIN TYPE: TYPE: ACUTE PAIN

## 2020-01-07 ASSESSMENT — PAIN DESCRIPTION - FREQUENCY: FREQUENCY: CONTINUOUS

## 2020-01-07 ASSESSMENT — PAIN DESCRIPTION - ONSET: ONSET: ON-GOING

## 2020-01-07 NOTE — PROGRESS NOTES
Med given for c/o HA. See MAR. Explained will be able to have full liquids and is satisfied with this. Transported to room 4269 per stretcher.

## 2020-01-07 NOTE — PROGRESS NOTES
Pharmacy Medication Reconciliation Note     List of medications patient is currently taking is complete. Allergies:  Patient has no known allergies. Source of information:   1. Conversation with patient  2. Epic  3. Dispensing records    Notes regarding home medications:   1. Added Ativan 0.5 mg that patient states he has been taking daily. 2. No longer taking ferrous sulfate.      Denies taking any other OTC or herbal medications    Claudean Rumpf, Pharmacy Intern  1/7/2020  1:11 PM

## 2020-01-07 NOTE — PROGRESS NOTES
INPATIENT CONSULTATION:    IDENTIFYING DATA/REASON FOR CONSULTATION   PATIENT:  Lio Lira  MRN:  [id number]  ADMIT DATE: 1/6/2020  TIME OF EVALUATION: 1/7/2020 8:06 AM  HOSPITAL STAY:   LOS: 1 day   CONSULTING PHYSICIAN: Ashwini Boogie MD   REASON FOR CONSULTATION: melena    Subjective:    Patient reports no further bowel movements or melena. He denies abdominal pain. MEDICATIONS   SCHEDULED:  mometasone-formoterol, 2 puff, BID  metoprolol tartrate, 25 mg, BID  sucralfate, 1 g, 4x Daily AC & HS  sodium chloride flush, 10 mL, 2 times per day      FLUIDS/DRIPS:     pantoprozole (PROTONIX) infusion 8 mg/hr (01/07/20 0338)    sodium chloride 150 mL/hr at 01/07/20 0325     PRNs: albuterol sulfate HFA, 2 puff, Q4H PRN  sodium chloride flush, 10 mL, PRN  acetaminophen, 650 mg, Q4H PRN  ondansetron, 4 mg, Q6H PRN  HYDROcodone 5 mg - acetaminophen, 1 tablet, Q6H PRN      ALLERGIES:  No Known Allergies      PHYSICAL EXAM     Vitals:    01/06/20 1857 01/06/20 1951 01/07/20 0018 01/07/20 0534   BP: 136/72 (!) 143/81 125/74 128/72   Pulse: 84 85 80 82   Resp: 14 18 16 16   Temp:  97.5 °F (36.4 °C) 97.4 °F (36.3 °C) 97.8 °F (36.6 °C)   TempSrc:  Oral Oral Oral   SpO2: 98% 97% 95%    Weight:    233 lb 7.5 oz (105.9 kg)   Height:    6' (1.829 m)       I/O last 3 completed shifts: In: 4629 [I.V.:1650]  Out: -     Physical Exam:  General appearance: alert, cooperative, no distress, appears stated age  Eyes: Anicteric  Head: Normocephalic, without obvious abnormality  Lungs: clear to auscultation bilaterally, Normal Effort  Heart: regular rate and rhythm, normal S1 and S2, no murmurs or rubs  Abdomen: soft, non-distended, non-tender. Bowel sounds normal. No masses,  no organomegaly.    Extremities: atraumatic, no cyanosis or edema  Skin: warm and dry, no jaundice  Neuro: Grossly intact, A&OX3    LABS AND IMAGING   Laboratory   Recent Labs     01/06/20  0755 01/06/20  1641 01/07/20  0003   WBC 18.7*  --   --    HGB

## 2020-01-07 NOTE — PROGRESS NOTES
Admission from ER to Room 4269 per [place] bed accompanied by transport technician. Patient is alert and oriented x 4, up ad nicholas. Patient assisted in bed, kept comfortable, side rails secured, bed in lowest position, bed locked on. Patient oriented to room and unit setting. Call bell instructed and put in reach. All needs attended. Safety ensured.  Electronically signed by Letty Hoffmann RN on 1/6/2020 at 8:04 PM'

## 2020-01-07 NOTE — PROGRESS NOTES
extremities. CN 2-12 tested, no defect noted. Psych: Oriented x 3. No anxiety. Awake. Alert. Intact judgement and insight. Data    LABS  CBC:   Recent Labs     01/06/20  0755 01/06/20  1641 01/07/20  0003   WBC 18.7*  --   --    HGB 13.2* 12.2* 11.5*   HCT 40.4*  --   --    MCV 85.1  --   --      --   --      BMP:   Recent Labs     01/06/20  0755      K 4.5      CO2 18*   BUN 35*   CREATININE 0.8*   GLUCOSE 118*     POC GLUCOSE:  No results for input(s): POCGLU in the last 72 hours. LIVER PROFILE:   Recent Labs     01/06/20  0755   AST 19   ALT 41*   LABALBU 4.5   BILITOT <0.2   ALKPHOS 67     PT/INR:   Recent Labs     01/06/20  0755   PROTIME 12.5   INR 1.08     APTT: No results for input(s): APTT in the last 72 hours. UA:No results for input(s): NITRITE, COLORU, PHUR, LABCAST, WBCUA, RBCUA, MUCUS, TRICHOMONAS, YEAST, BACTERIA, CLARITYU, SPECGRAV, LEUKOCYTESUR, UROBILINOGEN, BILIRUBINUR, BLOODU, GLUCOSEU, KETUA, AMORPHOUS in the last 72 hours. Microbiology:  Wound Culture: No results for input(s): WNDABS, ORG in the last 72 hours. Invalid input(s):  LABGRAM  Nasal Culture: No results for input(s): ORG, MRSAPCR in the last 72 hours. Blood Culture: No results for input(s): BC, BLOODCULT2 in the last 72 hours. Fungal Culture:   No results for input(s): FUNGSM in the last 72 hours. No results for input(s): FUNCXBLD in the last 72 hours. CSF Culture:  No results for input(s): COLORCSF, APPEARCSF, CFTUBE, CLOTCSF, WBCCSF, RBCCSF, NEUTCSF, NUMCELLSCSF, LYMPHSCSF, MONOCSF, GLUCCSF, VOLCSF in the last 72 hours. Respiratory Culture:  No results for input(s): Lowell Beery in the last 72 hours. AFB:No results for input(s): AFBSMEAR in the last 72 hours. Urine Culture  No results for input(s): LABURIN in the last 72 hours. RADIOLOGY:    XR CHEST STANDARD (2 VW)   Final Result   No acute cardiopulmonary pathology.              CONSULTS:    IP CONSULT TO GI  IP CONSULT TO

## 2020-01-07 NOTE — PROGRESS NOTES
4 Eyes Skin Assessment     The patient is being assess for  Admission    I agree that 2 RN's have performed a thorough Head to Toe Skin Assessment on the patient. ALL assessment sites listed below have been assessed. Areas assessed by both nurses: yes  [x]   Head, Face, and Ears   [x]   Shoulders, Back, and Chest  [x]   Arms, Elbows, and Hands   [x]   Coccyx, Sacrum, and IschIum  [x]   Legs, Feet, and Heels        Does the Patient have Skin Breakdown?   No         Adryan Prevention initiated:  Yes   Wound Care Orders initiated:  NA      [facility] nurse consulted for Pressure Injury (Stage 3,4, Unstageable, DTI, NWPT, and Complex wounds), New and Established Ostomies:  NA      Nurse 1 eSignature: Electronically signed by Didi Luz RN on 1/6/2020 at 8:05 PM      **SHARE this note so that the co-signing nurse is able to place an eSignature**    Nurse 2 eSignature: Electronically signed by Viral Mejía RN on 1/6/20 at 10:55 PM

## 2020-01-08 VITALS
SYSTOLIC BLOOD PRESSURE: 159 MMHG | WEIGHT: 233.91 LBS | BODY MASS INDEX: 31.68 KG/M2 | RESPIRATION RATE: 18 BRPM | OXYGEN SATURATION: 96 % | DIASTOLIC BLOOD PRESSURE: 97 MMHG | HEIGHT: 72 IN | TEMPERATURE: 98.3 F | HEART RATE: 92 BPM

## 2020-01-08 LAB
ANION GAP SERPL CALCULATED.3IONS-SCNC: 14 MMOL/L (ref 3–16)
BUN BLDV-MCNC: 15 MG/DL (ref 7–20)
CALCIUM SERPL-MCNC: 9.2 MG/DL (ref 8.3–10.6)
CHLORIDE BLD-SCNC: 103 MMOL/L (ref 99–110)
CO2: 21 MMOL/L (ref 21–32)
CREAT SERPL-MCNC: 0.9 MG/DL (ref 0.9–1.3)
GFR AFRICAN AMERICAN: >60
GFR NON-AFRICAN AMERICAN: >60
GLUCOSE BLD-MCNC: 105 MG/DL (ref 70–99)
HCT VFR BLD CALC: 34.8 % (ref 40.5–52.5)
HEMOGLOBIN: 11.6 G/DL (ref 13.5–17.5)
MCH RBC QN AUTO: 28.1 PG (ref 26–34)
MCHC RBC AUTO-ENTMCNC: 33.3 G/DL (ref 31–36)
MCV RBC AUTO: 84.4 FL (ref 80–100)
PDW BLD-RTO: 14.8 % (ref 12.4–15.4)
PLATELET # BLD: 304 K/UL (ref 135–450)
PMV BLD AUTO: 8.2 FL (ref 5–10.5)
POTASSIUM SERPL-SCNC: 4.1 MMOL/L (ref 3.5–5.1)
RBC # BLD: 4.13 M/UL (ref 4.2–5.9)
SODIUM BLD-SCNC: 138 MMOL/L (ref 136–145)
WBC # BLD: 9 K/UL (ref 4–11)

## 2020-01-08 PROCEDURE — 6370000000 HC RX 637 (ALT 250 FOR IP): Performed by: INTERNAL MEDICINE

## 2020-01-08 PROCEDURE — 6360000002 HC RX W HCPCS: Performed by: INTERNAL MEDICINE

## 2020-01-08 PROCEDURE — 80048 BASIC METABOLIC PNL TOTAL CA: CPT

## 2020-01-08 PROCEDURE — C9113 INJ PANTOPRAZOLE SODIUM, VIA: HCPCS | Performed by: INTERNAL MEDICINE

## 2020-01-08 PROCEDURE — 36415 COLL VENOUS BLD VENIPUNCTURE: CPT

## 2020-01-08 PROCEDURE — 85027 COMPLETE CBC AUTOMATED: CPT

## 2020-01-08 PROCEDURE — 94760 N-INVAS EAR/PLS OXIMETRY 1: CPT

## 2020-01-08 PROCEDURE — 94640 AIRWAY INHALATION TREATMENT: CPT

## 2020-01-08 PROCEDURE — 2580000003 HC RX 258: Performed by: INTERNAL MEDICINE

## 2020-01-08 RX ORDER — PANTOPRAZOLE SODIUM 40 MG/1
40 TABLET, DELAYED RELEASE ORAL
Status: DISCONTINUED | OUTPATIENT
Start: 2020-01-08 | End: 2020-01-08 | Stop reason: HOSPADM

## 2020-01-08 RX ORDER — PANTOPRAZOLE SODIUM 40 MG/1
40 TABLET, DELAYED RELEASE ORAL 2 TIMES DAILY
Qty: 60 TABLET | Refills: 2 | Status: SHIPPED | OUTPATIENT
Start: 2020-01-08 | End: 2020-05-05 | Stop reason: SDUPTHER

## 2020-01-08 RX ADMIN — MOMETASONE FUROATE AND FORMOTEROL FUMARATE DIHYDRATE 2 PUFF: 100; 5 AEROSOL RESPIRATORY (INHALATION) at 08:26

## 2020-01-08 RX ADMIN — SODIUM CHLORIDE 8 MG/HR: 9 INJECTION, SOLUTION INTRAVENOUS at 01:48

## 2020-01-08 RX ADMIN — SUCRALFATE 1 G: 1 TABLET ORAL at 05:24

## 2020-01-08 RX ADMIN — METOPROLOL TARTRATE 25 MG: 25 TABLET ORAL at 08:58

## 2020-01-08 NOTE — PROGRESS NOTES
BP remaining elevated at 147/96. Pt denies pain and is resting quietly in bed. Notified Dr Caren Pittman of BP- no new orders at this time. Protonix drip infusing at 10 ml/hr through [facility] IV- site dry and intact. Will continue to monitor. Call light in reach.

## 2020-01-08 NOTE — DISCHARGE SUMMARY
Status: Prior    Activity: activity as tolerated    Diet: regular diet        Labs: For convenience and continuity at follow-up the following most recent labs are provided:    CBC:   Lab Results   Component Value Date    WBC 9.0 01/08/2020    HGB 11.6 01/08/2020    HCT 34.8 01/08/2020     01/08/2020       RENAL:   Lab Results   Component Value Date     01/08/2020    K 4.1 01/08/2020    K 3.9 01/07/2020     01/08/2020    CO2 21 01/08/2020    BUN 15 01/08/2020    CREATININE 0.9 01/08/2020           Discharge Medications:    Aleksandra Beck   Home Medication Instructions AAN:[id number]    Printed on:01/08/20 4554   Medication Information                      albuterol sulfate HFA (PROVENTIL HFA) 108 (90 Base) MCG/ACT inhaler  Inhale 2 puffs into the lungs every 4 hours as needed for Wheezing or Shortness of Breath Please provide pt c spacer as well             budesonide-formoterol (SYMBICORT) 160-4.5 MCG/ACT AERO  Inhale 2 puffs into the lungs 2 times daily             cetirizine (ZYRTEC) 10 MG tablet  Take 1 tablet by mouth daily             lisinopril (PRINIVIL;ZESTRIL) 20 MG tablet  TAKE 1 2 (ONE HALF) TABLET BY MOUTH ONCE DAILY             LORazepam (ATIVAN) 0.5 MG tablet  Take 0.5 mg by mouth daily.              metoprolol tartrate (LOPRESSOR) 25 MG tablet  TAKE 1 TABLET BY MOUTH TWICE DAILY             Multiple Vitamins-Minerals (THERAPEUTIC MULTIVITAMIN-MINERALS) tablet  Take 1 tablet by mouth daily             pantoprazole (PROTONIX) 40 MG tablet  Take 1 tablet by mouth 2 times daily             sucralfate (CARAFATE) 1 GM tablet  Take 1 tablet by mouth 4 times daily                 Future Appointments   Date Time Provider Sudhakar Wilson   1/15/2020  3:00 PM Alice Monzon DO [address]   2/3/2020  8:00 AM ÓSCAR Fish - [facility]       Time Spent on discharge is more than 45 minutes in the examination, evaluation, counseling and review of medications and discharge plan. Signed:  Claudia Ring MD   1/8/2020    The note was completed using EMR. Every effort was made to ensure accuracy; however, inadvertent computerized transcription errors may be present. Thank you Marveen Kayser, DO for the opportunity to be involved in this patient's care. If you have any questions or concerns please feel free to contact me at [phone].

## 2020-01-08 NOTE — PROGRESS NOTES
Progress Note  Admit Date: 2020      PCP: Lorena Lundberg DO     CC: F/U for Gi bleed    SUBJECTIVE / Interval History:   Pt feels ok , no complaints        Allergies  Patient has no known allergies. Medications    Scheduled Meds:   LORazepam  0.5 mg Oral Nightly    mometasone-formoterol  2 puff Inhalation BID    metoprolol tartrate  25 mg Oral BID    sucralfate  1 g Oral 4x Daily AC & HS    sodium chloride flush  10 mL Intravenous 2 times per day     Continuous Infusions:   pantoprozole (PROTONIX) infusion 8 mg/hr (20 0148)       PRN Meds:  albuterol sulfate HFA, sodium chloride flush, acetaminophen, ondansetron, HYDROcodone 5 mg - acetaminophen    Vitals    TEMPERATURE:  Current - Temp: 98.1 °F (36.7 °C); Max - Temp  Av.8 °F (36.6 °C)  Min: 97.3 °F (36.3 °C)  Max: 98.1 °F (36.7 °C)  RESPIRATIONS RANGE: Resp  Av.3  Min: 16  Max: 18  PULSE RANGE: Pulse  Av.4  Min: 73  Max: 97  BLOOD PRESSURE RANGE:  Systolic (86LFQ), UKP:741 , Min:144 , LCE:515   ; Diastolic (11CVM), TZF:25, Min:77, Max:96    PULSE OXIMETRY RANGE: SpO2  Av.1 %  Min: 95 %  Max: 99 %  24HR INTAKE/OUTPUT:      Intake/Output Summary (Last 24 hours) at 2020 0855  Last data filed at 2020 0525  Gross per 24 hour   Intake 1480 ml   Output --   Net 1480 ml       Exam:    Gen: No distress. Eyes: PERRL. No sclera icterus. No conjunctival injection. ENT: No discharge. Pharynx clear. External appearance of ears and nose normal.  Neck: Trachea midline. No obvious mass. Resp: No accessory muscle use. No crackles. No wheezes. No rhonchi. No dullness on percussion. CV: Regular rate. Regular rhythm. No murmur or rub. No edema. GI: Non-tender. Non-distended. No hernia. Skin: Warm, dry, normal texture and turgor. No nodule on exposed extremities. Lymph: No cervical LAD. No supraclavicular LAD. M/S: No cyanosis. No clubbing. No joint deformity. Neuro: Moves all four extremities.  CN 2-12 tested, no defect hours.    RADIOLOGY:    XR CHEST STANDARD (2 VW)   Final Result   No acute cardiopulmonary pathology. CONSULTS:    IP CONSULT TO GI  IP CONSULT TO HOSPITALIST  IP CONSULT TO GI    ASSESSMENT AND PLAN:      Active Problems:    GI bleed  Resolved Problems:    * No resolved hospital problems. *    The patient is a 51 y.o. male with a past medical history of hypertension, tobacco, history of erosive esophagitis and esophageal ulcers use who presents to [facility] with tarry stool. Pt has been having some chest discomfort/ burning for 10 days, had bloating and fullness for 1 day . Ran out of Protonix for > 2 weeks, did not refill it . Patient came in with coughing blood along with black tarry stools. He was admitted with upper GI bleed and melena. Repeat endoscopy showed a large esophageal ulcer with a clot on it. Patient was treated with Protonix drip and switch to oral Protonix. Needs FU with GI in 8 weeks . Upper GI bleed with melena  -Patient has a history of esophageal ulcers. REpeat EGD shows esophageal ulcers with clot  -Continue Protonix drip  -GI has been consult, consult appreciated   -Continue sucralfate     HTN  - ct BB  - restart lisinopril         DVT Prophylaxis: scd  Diet: DIET GENERAL;  Code Status: Full Code        Discharge plan -today    The patient and / or the family were informed of the results of any tests, a time was given to answer questions, a plan was proposed and they agreed with plan. Discussed with consulting physicians, nursing and social work     The note was completed using EMR. Every effort was made to ensure accuracy; however, inadvertent computerized transcription errors may be present.        Tiffanie Real MD

## 2020-01-08 NOTE — PLAN OF CARE
Problem: Pain:  Goal: Pain level will decrease  Description  Pain level will decrease  Outcome: Ongoing  Note:   Pain/discomfort being managed with PRN analgesics per MD order. Pt able to express presence and absence of pain and rate pain appropriately using numerical scale       Problem: Falls - Risk of:  Goal: Will remain free from falls  Description  Will remain free from falls  1/8/2020 1002 by Helen Escalante RN  Outcome: Ongoing  Note:   Pt free from falls this shift. Fall precautions in place at all times. Call light within reach. Pt able to and agreeable to contact for safety appropriately. Problem:  Bowel/Gastric:  Goal: Ability to maintain normal bowel function will improve  Description  Ability to maintain normal bowel function will improve  1/8/2020 1002 by Helen Escalante RN  Outcome: Ongoing     Problem: Nutritional:  Goal: Ability to achieve adequate nutritional intake will improve  Description  Ability to achieve adequate nutritional intake will improve  Outcome: Ongoing

## 2020-01-08 NOTE — PLAN OF CARE
Problem: Pain:  Goal: Control of acute pain  Description  Control of acute pain  Outcome: Ongoing   Pain/discomfort being managed with PRN analgesics per MD orders. Pt able to express presence and absence of pain and rate pain appropriately using numerical scale. Problem: Falls - Risk of:  Goal: Will remain free from falls  Description  Will remain free from falls  Outcome: Ongoing     Problem:  Bowel/Gastric:  Goal: Ability to maintain normal bowel function will improve  Description  Ability to maintain normal bowel function will improve  Outcome: Ongoing

## 2020-01-14 ASSESSMENT — ENCOUNTER SYMPTOMS: SINUS COMPLAINT: 1

## 2020-01-15 ENCOUNTER — OFFICE VISIT (OUTPATIENT)
Dept: FAMILY MEDICINE CLINIC | Age: 50
End: 2020-01-15
Payer: MEDICARE

## 2020-01-15 VITALS
RESPIRATION RATE: 20 BRPM | BODY MASS INDEX: 31.42 KG/M2 | DIASTOLIC BLOOD PRESSURE: 80 MMHG | HEIGHT: 72 IN | HEART RATE: 88 BPM | SYSTOLIC BLOOD PRESSURE: 120 MMHG | WEIGHT: 232 LBS | OXYGEN SATURATION: 96 %

## 2020-01-15 PROCEDURE — 1111F DSCHRG MED/CURRENT MED MERGE: CPT | Performed by: FAMILY MEDICINE

## 2020-01-15 PROCEDURE — G8427 DOCREV CUR MEDS BY ELIG CLIN: HCPCS | Performed by: FAMILY MEDICINE

## 2020-01-15 PROCEDURE — G8484 FLU IMMUNIZE NO ADMIN: HCPCS | Performed by: FAMILY MEDICINE

## 2020-01-15 PROCEDURE — G8417 CALC BMI ABV UP PARAM F/U: HCPCS | Performed by: FAMILY MEDICINE

## 2020-01-15 PROCEDURE — 1036F TOBACCO NON-USER: CPT | Performed by: FAMILY MEDICINE

## 2020-01-15 PROCEDURE — 99213 OFFICE O/P EST LOW 20 MIN: CPT | Performed by: FAMILY MEDICINE

## 2020-01-15 RX ORDER — AMOXICILLIN 500 MG/1
500 CAPSULE ORAL 3 TIMES DAILY
Qty: 30 CAPSULE | Refills: 0 | Status: SHIPPED | OUTPATIENT
Start: 2020-01-15 | End: 2020-01-25

## 2020-01-15 ASSESSMENT — PATIENT HEALTH QUESTIONNAIRE - PHQ9
1. LITTLE INTEREST OR PLEASURE IN DOING THINGS: 0
SUM OF ALL RESPONSES TO PHQ9 QUESTIONS 1 & 2: 0
SUM OF ALL RESPONSES TO PHQ QUESTIONS 1-9: 0
2. FEELING DOWN, DEPRESSED OR HOPELESS: 0
SUM OF ALL RESPONSES TO PHQ QUESTIONS 1-9: 0

## 2020-01-15 ASSESSMENT — ENCOUNTER SYMPTOMS
SINUS PRESSURE: 1
SORE THROAT: 0
SWOLLEN GLANDS: 0
HOARSE VOICE: 1
SHORTNESS OF BREATH: 0
COUGH: 0

## 2020-01-15 NOTE — PROGRESS NOTES
budesonide-formoterol (SYMBICORT) 160-4.5 MCG/ACT AERO Inhale 2 puffs into the lungs 2 times daily Yes Kaylyn Serna, DO   metoprolol tartrate (LOPRESSOR) 25 MG tablet TAKE 1 TABLET BY MOUTH TWICE DAILY Yes Kaylyn Serna, DO   cetirizine (ZYRTEC) 10 MG tablet Take 1 tablet by mouth daily Yes Amena Manzo, DO      Review of Systems   Constitutional: Negative for chills and diaphoresis. HENT: Positive for congestion, hoarse voice, sinus pressure (around eyes/cheeks) and sneezing. Negative for ear pain and sore throat. Respiratory: Negative for cough and shortness of breath. Musculoskeletal: Negative for neck pain. Neurological: Positive for headaches (occasionally). Objective:   Physical Exam  Constitutional:       General: He is not in acute distress. Appearance: He is well-developed. He is not diaphoretic. HENT:      Right Ear: Tympanic membrane, ear canal and external ear normal. No middle ear effusion. Tympanic membrane is not injected, erythematous, retracted or bulging. Left Ear: Tympanic membrane, ear canal and external ear normal.  No middle ear effusion. Tympanic membrane is not injected, erythematous, retracted or bulging. Nose: Mucosal edema present. No rhinorrhea. Right Sinus: No maxillary sinus tenderness or frontal sinus tenderness. Left Sinus: No maxillary sinus tenderness or frontal sinus tenderness. Mouth/Throat:      Mouth: Mucous membranes are not pale, not dry and not cyanotic. Pharynx: Uvula midline. No oropharyngeal exudate, posterior oropharyngeal erythema or uvula swelling. Tonsils: No tonsillar abscesses. Eyes:      General: No scleral icterus. Right eye: No discharge. Left eye: No discharge. Conjunctiva/sclera: Conjunctivae normal.      Right eye: Right conjunctiva is not injected. No exudate. Left eye: Left conjunctiva is not injected. No exudate. Neck:      Musculoskeletal: Neck supple. Thyroid: No thyroid mass or thyromegaly. Cardiovascular:      Rate and Rhythm: Normal rate and regular rhythm. Heart sounds: Normal heart sounds. No murmur. No friction rub. No gallop. Pulmonary:      Effort: Pulmonary effort is normal. No respiratory distress. Breath sounds: Normal breath sounds. No decreased breath sounds, wheezing, rhonchi or rales. Lymphadenopathy:      Head:      Right side of head: No submandibular or tonsillar adenopathy. Left side of head: No submandibular or tonsillar adenopathy. Cervical: No cervical adenopathy. Right cervical: No superficial, deep or posterior cervical adenopathy. Left cervical: No superficial, deep or posterior cervical adenopathy. Skin:     General: Skin is warm and dry. Coloration: Skin is not pale. Vitals:    01/15/20 1454   BP: 120/80   Site: Left Upper Arm   Position: Sitting   Cuff Size: Large Adult   Pulse: 88   Resp: 20   SpO2: 96%   Weight: 232 lb (105.2 kg)  Comment: shoes on   Height: 6' (1.829 m)     BP Readings from Last 3 Encounters:   01/15/20 120/80   01/08/20 (!) 159/97   10/30/19 130/78     Pulse Readings from Last 3 Encounters:   01/15/20 88   01/08/20 92   10/30/19 85     Wt Readings from Last 3 Encounters:   01/15/20 232 lb (105.2 kg)   01/08/20 233 lb 14.5 oz (106.1 kg)   10/30/19 228 lb 6.4 oz (103.6 kg)     Body mass index is 31.46 kg/m². Assessment:      1. Acute bacterial sinusitis          Plan:    Rodrigo Sanchez was seen today for sinus problem. Diagnoses and all orders for this visit:    Acute bacterial sinusitis  -     amoxicillin (AMOXIL) 500 MG capsule; Take 1 capsule by mouth 3 times daily for 10 days                                           Instructions for Respiratory Infections (SAVE THIS SHEET)    For the first 7-14 days of symptoms follow instructions below, even before being seen in the office or even during treatment with antibiotics, until symptom free.     1. Water: Drink 1 meals and at bedtime at least 4 -5 times a day. This helps kill bacteria and viruses in the back of the throat and will shorten the duration and decrease the severity of your symptoms: sore throat, cough, ear popping,/ear pain, and possibly dizziness. 7. Smoking: Avoid smoking or exposure to second hand smoke. 8. Zinc: (DAY ONE OF SYMPTOMS)  Zinc lozenges such as Cold Oskar (available most stores), or Basic ([employer] brand) will help shorten the duration and lessen symptoms such as sore throat, cough, nasal congestion, runny nose, and post nasal drip. Use 1 lozenge every 2-4 hours ( after meals if stomach is sensitive). Children can use 10-15 mg or less 3-4 times a day or Zinc lollypops. In pregnancy limit to 50-60 mg a day for 7 days as prenatals have Zinc also. With diarrhea use zinc pills 50 mg 1/2 to 1 pill 2x/day. 9. Vitamins: Vitamin C 500 mg with breakfast and dinner. Children and pregnant women should drink citrus juices. This speeds healing and strengthens immune system. 10. Chest Symptoms: Vicks Vapor rub to the chest at bedtime. 11. Decongestants: Avoid all decongestants and antihistamine cold preparations in children. Try all of the above starting with day 1 of symptoms. If Strep throat symptoms appear call to be seen in the office as soon as possible and don't gargle on that day. Newborns, infants, or anyone with earaches or influenza may need to be seen quickly. Adults with fevers over 103 degrees or shortness of breath should call the office immediately. Probiotic ie Acidophilus. Away from antibiotics. Helps digestive tract.      [address] [facility], [address] [facility] [address] [facility] [address] [address]  Phone: [phone]  Fax: [phone]      Davide Huitron DO

## 2020-01-15 NOTE — PATIENT INSTRUCTIONS
Michele Olguin was seen today for sinus problem. Diagnoses and all orders for this visit:    Acute bacterial sinusitis  -     amoxicillin (AMOXIL) 500 MG capsule; Take 1 capsule by mouth 3 times daily for 10 days                                           Instructions for Respiratory Infections (SAVE THIS SHEET)    For the first 7-14 days of symptoms follow instructions below, even before being seen in the office or even during treatment with antibiotics, until symptom free. 1. Water: Drink 1 ounce of water for every 2 pounds of body weight for adults, 100 Ounces of water per day. This will loosen mucus in the head and chest & improve the weak feeling of dehydration, allow the body to get germ fighting resources to the infection. Half can be juice or sugar free Crystal Light. Don't count drinks with caffeine or carbonation. Infants can have Pedialyte liquid or freezer pops. Avoid salt if you have high Blood Pressure, swelling in the feet or ankles or have heart problems. 2. Humidity: Humidify the air to 35-50% ( or until the windows fog over slightly). Can use a humidifier, vaporizer, boil water on the stove or put a coffee can full of water on the heater vents. This will loosen mucus from infections and allergies. 3. Sleep: Get 8-10 hours a night and rest during the evening after work or school. If you have trouble sleeping, adults can take Melatonin 5mg up to 2 tabs at bedtime ( not for children or pregnant women). If Mono is suspected then sleep during 9PM to 9AM time span (if possible.)  4. Cough: Take cough medicines with Guaifenesin ( to loosen chest or head congestion) and Dextromethorphan ( to decrease excess cough). Robitussin D.M. Syrup every 4-6 hrs or Mucinex D. M. pills twice a day. Use the pediatric formulations for children over 6 months making sure they are alcohol & sugar free for children, pregnant women, and diabetics. 5. Pain And Fevers:  Take Acetaminophen ( Tylenol) for fevers, aches, and headaches. 2-500 mg every 8 hours for adults. Appropriate doses at bedtime for children may help them sleep better. If pregnant take 1 -500 mg (Tylenol) every 8 hours as needed. Ibuprofen may be used if not pregnant, but should be given with food to avoid nausea. Avoid Ibuprofen if you have high blood pressure, CHF, or kidney problems. 6.Gargle: (DAY ONE OF SYMPTOMS) Gargle in the back of the throat with the head tilted back and to the sides with a strong mouthwash  ( Listerine or Scope) after meals and at bedtime at least 4 -5 times a day. This helps kill bacteria and viruses in the back of the throat and will shorten the duration and decrease the severity of your symptoms: sore throat, cough, ear popping,/ear pain, and possibly dizziness. 7. Smoking: Avoid smoking or exposure to second hand smoke. 8. Zinc: (DAY ONE OF SYMPTOMS)  Zinc lozenges such as Cold Oskar (available most stores), or Basic ([employer] brand) will help shorten the duration and lessen symptoms such as sore throat, cough, nasal congestion, runny nose, and post nasal drip. Use 1 lozenge every 2-4 hours ( after meals if stomach is sensitive). Children can use 10-15 mg or less 3-4 times a day or Zinc lollypops. In pregnancy limit to 50-60 mg a day for 7 days as prenatals have Zinc also. With diarrhea use zinc pills 50 mg 1/2 to 1 pill 2x/day. 9. Vitamins: Vitamin C 500 mg with breakfast and dinner. Children and pregnant women should drink citrus juices. This speeds healing and strengthens immune system. 10. Chest Symptoms: Vicks Vapor rub to the chest at bedtime. 11. Decongestants: Avoid all decongestants and antihistamine cold preparations in children. Try all of the above starting with day 1 of symptoms. If Strep throat symptoms appear call to be seen in the office as soon as possible and don't gargle on that day. Newborns, infants, or anyone with earaches or influenza may need to be seen quickly.  Adults with fevers over 103 degrees or

## 2020-02-01 PROBLEM — D50.0 IRON DEFICIENCY ANEMIA DUE TO CHRONIC BLOOD LOSS: Chronic | Status: ACTIVE | Noted: 2019-10-31

## 2020-02-20 ENCOUNTER — TELEPHONE (OUTPATIENT)
Dept: FAMILY MEDICINE CLINIC | Age: 50
End: 2020-02-20

## 2020-02-20 NOTE — TELEPHONE ENCOUNTER
Patient has now had this sinus infection since November, was seen by Dr. Ema Mae in January and the medication helped a little but now is worse. Please give him a call back.        [address]

## 2020-02-21 RX ORDER — AMOXICILLIN AND CLAVULANATE POTASSIUM 875; 125 MG/1; MG/1
1 TABLET, FILM COATED ORAL 2 TIMES DAILY
Qty: 20 TABLET | Refills: 0 | Status: SHIPPED | OUTPATIENT
Start: 2020-02-21 | End: 2020-03-02

## 2020-02-21 NOTE — TELEPHONE ENCOUNTER
CALLED AND SPOKE TO PATIENT AND ADVISED ON DR. Devan Quezada NOTE. PATIENT STATES HE ALREADY WENT TO ENT IN January IN 2019 AND ALSO HAD A CT SCAN DONE AND IT SHOWED 2 GROWTHS IN HIS SINUS CAVITIES. NOT SURE WHERE TO GO FROM HERE.  [facility]

## 2020-02-28 ENCOUNTER — ANESTHESIA EVENT (OUTPATIENT)
Dept: ENDOSCOPY | Age: 50
End: 2020-02-28
Payer: MEDICARE

## 2020-03-02 ENCOUNTER — ANESTHESIA (OUTPATIENT)
Dept: ENDOSCOPY | Age: 50
End: 2020-03-02
Payer: MEDICARE

## 2020-03-02 ENCOUNTER — HOSPITAL ENCOUNTER (OUTPATIENT)
Age: 50
Setting detail: OUTPATIENT SURGERY
Discharge: HOME | End: 2020-03-02
Attending: INTERNAL MEDICINE | Admitting: INTERNAL MEDICINE
Payer: MEDICARE

## 2020-03-02 VITALS
OXYGEN SATURATION: 95 % | SYSTOLIC BLOOD PRESSURE: 128 MMHG | DIASTOLIC BLOOD PRESSURE: 87 MMHG | RESPIRATION RATE: 15 BRPM

## 2020-03-02 VITALS
WEIGHT: 230.56 LBS | DIASTOLIC BLOOD PRESSURE: 97 MMHG | RESPIRATION RATE: 16 BRPM | HEART RATE: 74 BPM | BODY MASS INDEX: 31.23 KG/M2 | OXYGEN SATURATION: 98 % | TEMPERATURE: 97.9 F | HEIGHT: 72 IN | SYSTOLIC BLOOD PRESSURE: 152 MMHG

## 2020-03-02 PROCEDURE — 6360000002 HC RX W HCPCS: Performed by: NURSE ANESTHETIST, CERTIFIED REGISTERED

## 2020-03-02 PROCEDURE — 2580000003 HC RX 258: Performed by: ANESTHESIOLOGY

## 2020-03-02 PROCEDURE — 3700000000 HC ANESTHESIA ATTENDED CARE: Performed by: INTERNAL MEDICINE

## 2020-03-02 PROCEDURE — 88305 TISSUE EXAM BY PATHOLOGIST: CPT

## 2020-03-02 PROCEDURE — 3609012400 HC EGD TRANSORAL BIOPSY SINGLE/MULTIPLE: Performed by: INTERNAL MEDICINE

## 2020-03-02 PROCEDURE — 2500000003 HC RX 250 WO HCPCS: Performed by: NURSE ANESTHETIST, CERTIFIED REGISTERED

## 2020-03-02 PROCEDURE — 7100000010 HC PHASE II RECOVERY - FIRST 15 MIN: Performed by: INTERNAL MEDICINE

## 2020-03-02 PROCEDURE — 2709999900 HC NON-CHARGEABLE SUPPLY: Performed by: INTERNAL MEDICINE

## 2020-03-02 PROCEDURE — 7100000011 HC PHASE II RECOVERY - ADDTL 15 MIN: Performed by: INTERNAL MEDICINE

## 2020-03-02 RX ORDER — SODIUM CHLORIDE 9 MG/ML
INJECTION, SOLUTION INTRAVENOUS CONTINUOUS
Status: DISCONTINUED | OUTPATIENT
Start: 2020-03-02 | End: 2020-03-02 | Stop reason: HOSPADM

## 2020-03-02 RX ORDER — LIDOCAINE HYDROCHLORIDE 20 MG/ML
INJECTION, SOLUTION EPIDURAL; INFILTRATION; INTRACAUDAL; PERINEURAL PRN
Status: DISCONTINUED | OUTPATIENT
Start: 2020-03-02 | End: 2020-03-02 | Stop reason: SDUPTHER

## 2020-03-02 RX ORDER — PROPOFOL 10 MG/ML
INJECTION, EMULSION INTRAVENOUS PRN
Status: DISCONTINUED | OUTPATIENT
Start: 2020-03-02 | End: 2020-03-02 | Stop reason: SDUPTHER

## 2020-03-02 RX ORDER — SODIUM CHLORIDE 0.9 % (FLUSH) 0.9 %
10 SYRINGE (ML) INJECTION PRN
Status: DISCONTINUED | OUTPATIENT
Start: 2020-03-02 | End: 2020-03-02 | Stop reason: HOSPADM

## 2020-03-02 RX ORDER — SODIUM CHLORIDE 0.9 % (FLUSH) 0.9 %
10 SYRINGE (ML) INJECTION EVERY 12 HOURS SCHEDULED
Status: DISCONTINUED | OUTPATIENT
Start: 2020-03-02 | End: 2020-03-02 | Stop reason: HOSPADM

## 2020-03-02 RX ADMIN — PROPOFOL 100 MG: 10 INJECTION, EMULSION INTRAVENOUS at 07:38

## 2020-03-02 RX ADMIN — PROPOFOL 100 MG: 10 INJECTION, EMULSION INTRAVENOUS at 07:40

## 2020-03-02 RX ADMIN — PROPOFOL 100 MG: 10 INJECTION, EMULSION INTRAVENOUS at 07:35

## 2020-03-02 RX ADMIN — SODIUM CHLORIDE: 9 INJECTION, SOLUTION INTRAVENOUS at 07:28

## 2020-03-02 RX ADMIN — LIDOCAINE HYDROCHLORIDE 50 MG: 20 INJECTION, SOLUTION EPIDURAL; INFILTRATION; INTRACAUDAL; PERINEURAL at 07:35

## 2020-03-02 RX ADMIN — SODIUM CHLORIDE: 9 INJECTION, SOLUTION INTRAVENOUS at 07:08

## 2020-03-02 ASSESSMENT — PAIN - FUNCTIONAL ASSESSMENT: PAIN_FUNCTIONAL_ASSESSMENT: 0-10

## 2020-03-02 ASSESSMENT — PAIN SCALES - GENERAL
PAINLEVEL_OUTOF10: 0
PAINLEVEL_OUTOF10: 0

## 2020-03-02 ASSESSMENT — LIFESTYLE VARIABLES: SMOKING_STATUS: 1

## 2020-03-02 ASSESSMENT — PAIN SCALES - WONG BAKER: WONGBAKER_NUMERICALRESPONSE: 0

## 2020-03-02 NOTE — H&P
[facility]   Pre-operative History and Physical    Patient: Alexandrea Muñoz  : 1970  Acct#: [de-identified]    History Obtained From: electronic medical record    HISTORY OF PRESENT ILLNESS  Procedure:EGD  Indications:history of gastric ulcer  Past Medical History:        Diagnosis Date    Anxiety     Asthma     Former smoker 2019    started age 17, quit age 53    Fracture of finger of right hand     And knuckle    History of blood transfusion     new this admission 6/10/19    Hypertension     Iron deficiency anemia due to chronic blood loss 10/31/2019    DUE TO GI BLEED/ULCERS    Peptic ulcer      Past Surgical History:        Procedure Laterality Date    COLONOSCOPY  2015    1 benign polyp    ENDOSCOPY, COLON, DIAGNOSTIC      UPPER GASTROINTESTINAL ENDOSCOPY  3/2915    3 ulcers - peptic and esophageal    UPPER GASTROINTESTINAL ENDOSCOPY  2019    +ulcers    UPPER GASTROINTESTINAL ENDOSCOPY N/A 2019    EGD DIAGNOSTIC ONLY performed by Srinivas Pruitt MD at [address] [place] [address],[address]  2019    Dr. Rosangela Lieberman, ulcer healing in esophagitis, Parra's 1    UPPER GASTROINTESTINAL ENDOSCOPY N/A 2020    EGD CONTROL HEMORRHAGE performed by Saurabh Dorsey MD at [address] [place] [address],[address] 2020    EGD SUBMUCOSAL/ INJECTION OF EPINEPHRINE performed by Saurabh Dorsey MD at [address]       Medications prior to admission:   Prior to Admission medications    Medication Sig Start Date End Date Taking?  Authorizing Provider   amoxicillin-clavulanate (AUGMENTIN) 875-125 MG per tablet Take 1 tablet by mouth 2 times daily for 10 days 2/21/20 3/2/20 Yes Enriqueta Wagner DO   metoprolol tartrate (LOPRESSOR) 25 MG tablet TAKE 1 TABLET BY MOUTH TWICE DAILY 20  Yes Enriqueta Wagner DO   pantoprazole (PROTONIX) 40 MG tablet Take 1 tablet by mouth 2 times daily 20  Yes Celi Key MD   LORazepam (ATIVAN) 0.5 MG tablet Take 0.5 mg by mouth daily. Yes Historical Provider, MD   Multiple Vitamins-Minerals (THERAPEUTIC MULTIVITAMIN-MINERALS) tablet Take 1 tablet by mouth daily   Yes Historical Provider, MD   lisinopril (PRINIVIL;ZESTRIL) 20 MG tablet TAKE 1 2 (ONE HALF) TABLET BY MOUTH ONCE DAILY 19  Yes Dax Cristina APRN - CNP   albuterol sulfate HFA (PROVENTIL HFA) 108 (90 Base) MCG/ACT inhaler Inhale 2 puffs into the lungs every 4 hours as needed for Wheezing or Shortness of Breath Please provide pt c spacer as well 10/30/19 10/29/20 Yes ÓSCAR Olmedo - CNP   sucralfate (CARAFATE) 1 GM tablet Take 1 tablet by mouth 4 times daily 10/30/19  Yes Dax Cristina APRN - CNP   budesonide-formoterol [facility]) 160-4.5 MCG/ACT AERO Inhale 2 puffs into the lungs 2 times daily 10/9/19  Yes Carolynn Pereira DO     Allergies:   Patient has no known allergies.     Social History     Socioeconomic History    Marital status: Single     Spouse name: Not on file    Number of children: Not on file    Years of education: Not on file    Highest education level: Not on file   Occupational History    Occupation: FACTORY IN PAST   Social Needs    Financial resource strain: Not on file    Food insecurity:     Worry: Not on file     Inability: Not on file    Transportation needs:     Medical: No     Non-medical: No   Tobacco Use    Smoking status: Former Smoker     Packs/day: 1.00     Years: 30.00     Pack years: 30.00     Types: Cigarettes     Start date: 1988     Last attempt to quit: 2019     Years since quittin.8    Smokeless tobacco: Never Used    Tobacco comment: 1 PPD (QUIT [id number])   Substance and Sexual Activity    Alcohol use: No    Drug use: Yes     Types: Marijuana    Sexual activity: Yes   Lifestyle    Physical activity:     Days per week: Not on file     Minutes per session: Not on file    Stress: Not on file   Relationships    Social connections:     Talks on phone: Not on file     Gets together: Not on file     Attends Buddhist service: Not on file     Active member of club or organization: Not on file     Attends meetings of clubs or organizations: Not on file     Relationship status: Not on file    Intimate partner violence:     Fear of current or ex partner: Not on file     Emotionally abused: Not on file     Physically abused: Not on file     Forced sexual activity: Not on file   Other Topics Concern    Not on file   Social History Narrative    Exercise - too light headed. 3/26/19. None. 1/15/20. Family History   Problem Relation Age of Onset    Other Mother         gall bladder    Heart Attack Father 52        x3-4    Coronary Art Dis Father     Alcohol Abuse Father     Other Brother         congenital calcium deposits n bones    Cancer Maternal Aunt         brain    Cancer Maternal Grandmother     Cancer Maternal Grandfather     Asthma Paternal Grandfather     Other Maternal Aunt         lung issues    Hearing Loss Brother         congenital    No Known Problems Brother          PHYSICAL EXAM:      BP (!) 150/91   Pulse 74   Temp 97.5 °F (36.4 °C) (Temporal)   Resp 16   Ht 6' (1.829 m)   Wt 230 lb 9 oz (104.6 kg)   SpO2 99%   BMI 31.27 kg/m²  I        Heart:normal    Lungs: normal    Abdomen: normal      ASA Grade:  See anesthesia note      ASSESSMENT AND PLAN:    1. Procedure options, risks and benefits reviewed with patient and expresses understanding.

## 2020-03-02 NOTE — ANESTHESIA PRE PROCEDURE
[email or web address]    Smokeless tobacco: Never Used    Tobacco comment: 1 PPD (QUIT [id number])   Substance Use Topics    Alcohol use: No    Drug use: Yes     Types: Marijuana     Medications  Current Facility-Administered Medications on File Prior to Visit   Medication Dose Route Frequency Provider Last Rate Last Dose    0.9 % sodium chloride infusion   Intravenous Continuous Javi Nguyen MD        sodium chloride flush 0.9 % injection 10 mL  10 mL Intravenous 2 times per day Javi Nguyen MD        sodium chloride flush 0.9 % injection 10 mL  10 mL Intravenous PRN Javi Nguyen MD         Current Outpatient Medications on File Prior to Visit   Medication Sig Dispense Refill    amoxicillin-clavulanate (AUGMENTIN) 875-125 MG per tablet Take 1 tablet by mouth 2 times daily for 10 days 20 tablet 0    metoprolol tartrate (LOPRESSOR) 25 MG tablet TAKE 1 TABLET BY MOUTH TWICE DAILY 180 tablet 0    pantoprazole (PROTONIX) 40 MG tablet Take 1 tablet by mouth 2 times daily 60 tablet 2    LORazepam (ATIVAN) 0.5 MG tablet Take 0.5 mg by mouth daily.  Multiple Vitamins-Minerals (THERAPEUTIC MULTIVITAMIN-MINERALS) tablet Take 1 tablet by mouth daily      lisinopril (PRINIVIL;ZESTRIL) 20 MG tablet TAKE 1 2 (ONE HALF) TABLET BY MOUTH ONCE DAILY 30 tablet 1    albuterol sulfate HFA (PROVENTIL HFA) 108 (90 Base) MCG/ACT inhaler Inhale 2 puffs into the lungs every 4 hours as needed for Wheezing or Shortness of Breath Please provide pt c spacer as well 1 Inhaler 5    sucralfate (CARAFATE) 1 GM tablet Take 1 tablet by mouth 4 times daily 120 tablet 3    budesonide-formoterol (SYMBICORT) 160-4.5 MCG/ACT AERO Inhale 2 puffs into the lungs 2 times daily 1 Inhaler 3     No current facility-administered medications for this visit. No current outpatient medications on file.      Facility-Administered Medications Ordered in Other Visits   Medication Dose Route Frequency Provider Last Rate Last Anesthetic plan, risks, benefits, alternatives, and personnel involved discussed with patient. Patient verbalized an understanding and agrees to proceed.       Tip Vergara MD  March 2, 2020  7:03 AM

## 2020-03-11 ENCOUNTER — OFFICE VISIT (OUTPATIENT)
Dept: ENT CLINIC | Age: 50
End: 2020-03-11
Payer: MEDICARE

## 2020-03-11 VITALS — DIASTOLIC BLOOD PRESSURE: 94 MMHG | OXYGEN SATURATION: 97 % | SYSTOLIC BLOOD PRESSURE: 156 MMHG | HEART RATE: 96 BPM

## 2020-03-11 PROCEDURE — G8484 FLU IMMUNIZE NO ADMIN: HCPCS | Performed by: OTOLARYNGOLOGY

## 2020-03-11 PROCEDURE — 99213 OFFICE O/P EST LOW 20 MIN: CPT | Performed by: OTOLARYNGOLOGY

## 2020-03-11 PROCEDURE — G8427 DOCREV CUR MEDS BY ELIG CLIN: HCPCS | Performed by: OTOLARYNGOLOGY

## 2020-03-11 PROCEDURE — 1036F TOBACCO NON-USER: CPT | Performed by: OTOLARYNGOLOGY

## 2020-03-11 PROCEDURE — G8417 CALC BMI ABV UP PARAM F/U: HCPCS | Performed by: OTOLARYNGOLOGY

## 2020-03-11 RX ORDER — METHYLPREDNISOLONE 4 MG/1
4 TABLET ORAL SEE ADMIN INSTRUCTIONS
Qty: 1 KIT | Refills: 0 | Status: SHIPPED | OUTPATIENT
Start: 2020-03-11 | End: 2020-04-09 | Stop reason: ALTCHOICE

## 2020-03-11 RX ORDER — LEVOFLOXACIN 500 MG/1
500 TABLET, FILM COATED ORAL DAILY
Qty: 10 TABLET | Refills: 0 | Status: SHIPPED | OUTPATIENT
Start: 2020-03-11 | End: 2020-04-09 | Stop reason: ALTCHOICE

## 2020-03-19 ENCOUNTER — HOSPITAL ENCOUNTER (OUTPATIENT)
Dept: CT IMAGING | Age: 50
Discharge: HOME | End: 2020-03-19
Payer: MEDICARE

## 2020-03-19 PROCEDURE — 70486 CT MAXILLOFACIAL W/O DYE: CPT

## 2020-04-06 RX ORDER — LISINOPRIL 20 MG/1
TABLET ORAL
Qty: 30 TABLET | Refills: 1 | Status: SHIPPED | OUTPATIENT
Start: 2020-04-06 | End: 2020-08-07

## 2020-04-09 ENCOUNTER — TELEPHONE (OUTPATIENT)
Dept: FAMILY MEDICINE CLINIC | Age: 50
End: 2020-04-09

## 2020-04-09 NOTE — TELEPHONE ENCOUNTER
PT IS ON  LORAZEPAM - HE HAS BEEN TAKING MORE THAN NORMAL FROM THE COVID -19 HE STATES HIS ANXIETY IS OFF THE CHAIN -    PT IS WANTING TO KNOW IF YOU COULD INCREASE THIS OR GIVE HIM SOMETHING ELSE FOR HIS ANXIETY -  HE HAS AN APPT NEXT WEEK- HE CAN'T WAIT ANTOINETET [place] [address], [address] [facility] [phone] - [phone]    PT @  [phone]

## 2020-04-15 ENCOUNTER — VIRTUAL VISIT (OUTPATIENT)
Dept: FAMILY MEDICINE CLINIC | Age: 50
End: 2020-04-15
Payer: MEDICARE

## 2020-04-15 VITALS — HEIGHT: 72 IN | BODY MASS INDEX: 31.27 KG/M2

## 2020-04-15 PROCEDURE — 99214 OFFICE O/P EST MOD 30 MIN: CPT | Performed by: FAMILY MEDICINE

## 2020-04-15 NOTE — PROGRESS NOTES
Subjective:      Patient ID: Rica Herrera is a 51 y.o. male. Chief Complaint   Patient presents with    Other     3 MO FOLLOW UP BLEEDING ULCER   246  HPI    Parra's   GI said be seen 8 or 9/2020. EGD and colon q 3 years. No heartburn/indigestion. No black tarry stools since Jan. As long as on med no sx. Cough and SOB 8/2019  Given Symbicort and not using. Is using albuterol 2x/day. His sx are all year round. Was not triggered by infection. Had EGD and came home with cough whenever he was awake. He got better after. He woke up and couldn't breathe a few days ago and he felt like he was drowning. Eats dinner and done by 7 then bed 730. Cut out sweets, chocolate, salty foods, fatty foods. June 2018 problem started. Started changing diet in 2019 after hospitalized. Has BM qd. HTN  Has a wrist cuff but his is not accurate. Said 200/110 but was checked elsewhere and was much lower. He eats some processed foods. Prior to Visit Medications    Medication Sig Taking? Authorizing Provider   lisinopril (PRINIVIL;ZESTRIL) 20 MG tablet TAKE 1 2 (ONE HALF) TABLET BY MOUTH ONCE DAILY Yes Sandy Redman, DO   metoprolol tartrate (LOPRESSOR) 25 MG tablet TAKE 1 TABLET BY MOUTH TWICE DAILY Yes Robin Sterling, DO   pantoprazole (PROTONIX) 40 MG tablet Take 1 tablet by mouth 2 times daily Yes Pinky Borrego MD   LORazepam (ATIVAN) 0.5 MG tablet Take 0.5 mg by mouth twice daily.  Yes Historical Provider, MD   Multiple Vitamins-Minerals (THERAPEUTIC MULTIVITAMIN-MINERALS) tablet Take 1 tablet by mouth daily Yes Historical Provider, MD   albuterol sulfate HFA (PROVENTIL HFA) 108 (90 Base) MCG/ACT inhaler Inhale 2 puffs into the lungs every 4 hours as needed for Wheezing or Shortness of Breath Please provide pt c spacer as well Yes ÓSCAR Santana - DILEEP   budesonide-formoterol (SYMBICORT) 160-4.5 MCG/ACT AERO Inhale 2 puffs into the lungs 2 times daily  Patient not taking: Reported on 4/15/2020  Edith Shafer distal Parra's that was biopsied. .     The scope was then advanced into the stomach. Visualization of the gastric body and antrum demonstrated gastritis. The antrum was biopsied. .  A retroflexed exam of the gastric cardia and fundus demonstrated normal..  The pylorus was patent and the scope was advanced into the duodenum. Visualization of the duodenal bulb demonstrated normal..  The second portion of the duodenum demonstrated normal..     The scope was then withdrawn back into the stomach, it was decompressed, and the scope was completely withdrawn. The patient tolerated the procedure well and was taken to the post anesthesia care unit in good condition.     Estimated Blood loss:  Minimal.  Impression: Distal esophageal ulcer and distal Parra's. Gastritis. Recommendations:Await pathology. BID nexium. Assessment:      1. Ulcer of esophagus without bleeding    2. Peptic ulcer disease    3. Gastrointestinal hemorrhage associated with peptic ulcer    4. Iron deficiency anemia due to chronic blood loss    5. Essential hypertension    6. Cough    7. Shortness of breath    8. COVID-19 virus vaccination not indicated          Plan:    phone     Vandana Menezes is a 51 y.o. male evaluated via telephone on 4/15/2020. Lela Lefort was seen today for other. Diagnoses and all orders for this visit:    Ulcer of esophagus without bleeding/ Peptic ulcer disease/Gastrointestinal hemorrhage associated with peptic ulcer  Avoid eating 3 to 4 hours prior to bedtime. Avoid over-the-counter Advil Aleve and aspirin for pain. Frequent small balanced meals help to avoid problems. Iron deficiency anemia due to chronic blood loss   Ref.  Range 9/3/2018 01:15 6/10/2019 10:40 6/10/2019 22:15 6/11/2019 09:35 6/13/2019 05:57 7/1/2019 09:51 9/9/2019 07:48 1/6/2020 07:55 1/6/2020 16:41 1/7/2020 00:03 1/7/2020 07:34 1/8/2020 07:26   Hemoglobin Quant 13.5 - 17.5 g/dL 15.0 6.8 (LL) 6.0 (LL) 7.5 (L) 7.2 (L) 9.6 (L) 11.4 (L) 13.2 during their illness. You should restrict activities outside your home, except for getting medical care. Do not go to work, school, or public areas. Avoid using public transportation, ride-sharing, or taxis. Separate yourself from other people and animals in your home  People: As much as possible, you should stay in a specific room and away from other people in your home. Also, you should use a separate bathroom, if available. Animals: You should restrict contact with pets and other animals while you are sick with COVID-19, just like you would around other people. Although there have not been reports of pets or other animals becoming sick with COVID-19, it is still recommended that people sick with COVID-19 limit contact with animals until more information is known about the virus. When possible, have another member of your household care for your animals while you are sick. If you are sick with COVID-19, avoid contact with your pet, including petting, snuggling, being kissed or licked, and sharing food. If you must care for your pet or be around animals while you are sick, wash your hands before and after you interact with pets and wear a facemask. Call ahead before visiting your doctor  If you have a medical appointment, call the healthcare provider and tell them that you have or may have COVID-19. This will help the healthcare providers office take steps to keep other people from getting infected or exposed. Wear a facemask  You should wear a facemask when you are around other people (e.g., sharing a room or vehicle) or pets and before you enter a healthcare providers office. If you are not able to wear a facemask (for example, because it causes trouble breathing), then people who live with you should not stay in the same room with you, or they should wear a facemask if they enter your room. Cover your coughs and sneezes  Cover your mouth and nose with a tissue when you cough or sneeze.  Throw used tissues waiting room from getting infected or exposed. Ask your healthcare provider to call the local or state health department. Persons who are placed under active monitoring or facilitated self-monitoring should follow instructions provided by their local health department or occupational health professionals, as appropriate. When working with your local health department check their available hours. If you have a medical emergency and need to call 911, notify the dispatch personnel that you have, or are being evaluated for COVID-19. If possible, put on a facemask before emergency medical services arrive. Discontinuing home isolation  Patients with confirmed COVID-19 should remain under home isolation precautions until the risk of secondary transmission to others is thought to be low. The decision to discontinue home isolation precautions should be made on a case-by-case basis, in consultation with healthcare providers and state and local health departments. Use Tylenol NOT Ibuprofen/Aleve/aspirin for pain or fevers. There is a theoretical decrease in the body's ability to fight the virus when you are infected if you are on NSAIDs. The FDA has said that this information is not yet proven. The newest evidence suggest that wearing a mask in public may be beneficial if you remember that the outside of it is contaminated and treat it accordingly. It also helps prevent spread if someone has an asymptomatic or presymptomatic case and does not know it yet. Even cloth masks can be beneficial.    Continue vitamin D 2000 IU twice daily and change vitamin C 1000 mg to one half twice daily with a meal.  Vitamin D orally and vitamin C intravenously is being used in the hospital to treat COVID-19. Continue both of these for the duration of the moderate risk of COVID-19 pandemic which is estimated to be 1 year. Facemask and handwashing precautions should continue.   When things are relaxed it would also continue think in they are alcohol & sugar free for children, pregnant women, and diabetics. 5. Pain And Fevers: Take Acetaminophen ( Tylenol) for fevers, aches, and headaches. 2-500 mg every 8 hours for adults. Appropriate doses at bedtime for children may help them sleep better. If pregnant take 1 -500 mg (Tylenol) every 8 hours as needed. Ibuprofen/Aleve/aspirin for pain and fevers SHOULD NOT BE USED IN THE SETTING OF POSSIBLE COVID-19 viral infection NOR if pregnant, if you have acid reflux, high blood pressure, CHF, or kidney problems. 6.Gargle: (DAY ONE OF SYMPTOMS) Gargle in the back of the throat with the head tilted back and to the sides with a strong mouthwash  ( Listerine or Scope) after meals and at bedtime at least 4 -5 times a day. This helps kill bacteria and viruses in the back of the throat and will shorten the duration and decrease the severity of your symptoms: sore throat, cough, ear popping,/ear pain, and possibly dizziness. 7. Smoking: Avoid smoking or exposure to second hand smoke. 8. Zinc: (DAY ONE OF SYMPTOMS)  Zinc lozenges such as Cold Oskar (available most stores), or Basic ([employer] brand) will help shorten the duration and lessen symptoms such as sore throat, cough, nasal congestion, runny nose, and post nasal drip. Use 1 lozenge every 2-4 hours ( after meals if stomach is sensitive). Children can use 10-15 mg or less 3-4 times a day or Zinc lollypops. In pregnancy limit to 50-60 mg a day for 7 days as prenatals have Zinc also. With diarrhea use zinc pills 50 mg 1/2 to 1 pill 2x/day. 9. Vitamins: Vitamin C 500 mg with breakfast and dinner (with suspected or diagnosed COVID-19 infection take vitamin C 1000 mg 2-3 times a day). Children and pregnant women should drink citrus juices. This speeds healing and strengthens immune system. 10. Chest Symptoms: Vicks Vapor rub to the chest at bedtime. 11. Decongestants: Avoid all decongestants and antihistamine cold preparations in children.   Decongestants

## 2020-04-26 NOTE — PATIENT INSTRUCTIONS
the healthcare provider and tell them that you have or may have COVID-19. This will help the healthcare providers office take steps to keep other people from getting infected or exposed. Wear a facemask  You should wear a facemask when you are around other people (e.g., sharing a room or vehicle) or pets and before you enter a healthcare providers office. If you are not able to wear a facemask (for example, because it causes trouble breathing), then people who live with you should not stay in the same room with you, or they should wear a facemask if they enter your room. Cover your coughs and sneezes  Cover your mouth and nose with a tissue when you cough or sneeze. Throw used tissues in a lined trash can. Immediately wash your hands with soap and water for at least 20 seconds or, if soap and water are not available, clean your hands with an alcohol-based hand  that contains at least 60% alcohol. Clean your hands often  Wash your hands often with soap and water for at least 20 seconds, especially after blowing your nose, coughing, or sneezing; going to the bathroom; and before eating or preparing food. If soap and water are not readily available, use an alcohol-based hand  with at least 60% alcohol, covering all surfaces of your hands and rubbing them together until they feel dry. Soap and water are the best option if hands are visibly dirty. Avoid touching your eyes, nose, and mouth with unwashed hands. Avoid sharing personal household items  You should not share dishes, drinking glasses, cups, eating utensils, towels, or bedding with other people or pets in your home. After using these items, they should be washed thoroughly with soap and water. Clean all high-touch surfaces everyday  High touch surfaces include counters, tabletops, doorknobs, bathroom fixtures, toilets, phones, keyboards, tablets, and bedside tables.  Also, clean any surfaces that may have blood, stool, or body fluids on them. Use a household cleaning spray or wipe, according to the label instructions. Labels contain instructions for safe and effective use of the cleaning product including precautions you should take when applying the product, such as wearing gloves and making sure you have good ventilation during use of the product. Monitor your symptoms  Seek prompt medical attention if your illness is worsening (e.g., difficulty breathing). Before seeking care, call your healthcare provider and tell them that you have, or are being evaluated for, COVID-19. Put on a facemask before you enter the facility. These steps will help the healthcare providers office to keep other people in the office or waiting room from getting infected or exposed. Ask your healthcare provider to call the local or state health department. Persons who are placed under active monitoring or facilitated self-monitoring should follow instructions provided by their local health department or occupational health professionals, as appropriate. When working with your local health department check their available hours. If you have a medical emergency and need to call 911, notify the dispatch personnel that you have, or are being evaluated for COVID-19. If possible, put on a facemask before emergency medical services arrive. Discontinuing home isolation  Patients with confirmed COVID-19 should remain under home isolation precautions until the risk of secondary transmission to others is thought to be low. The decision to discontinue home isolation precautions should be made on a case-by-case basis, in consultation with healthcare providers and state and local health departments. Use Tylenol NOT Ibuprofen/Aleve/aspirin for pain or fevers. There is a theoretical decrease in the body's ability to fight the virus when you are infected if you are on NSAIDs. The FDA has said that this information is not yet proven.      The newest evidence suggest that wearing

## 2020-04-27 RX ORDER — LORAZEPAM 0.5 MG/1
0.5 TABLET ORAL DAILY
Qty: 30 TABLET | Refills: 2 | Status: SHIPPED | OUTPATIENT
Start: 2020-04-27 | End: 2020-10-06 | Stop reason: SDUPTHER

## 2020-05-05 ENCOUNTER — TELEPHONE (OUTPATIENT)
Dept: ADMINISTRATIVE | Age: 50
End: 2020-05-05

## 2020-05-05 ENCOUNTER — VIRTUAL VISIT (OUTPATIENT)
Dept: FAMILY MEDICINE CLINIC | Age: 50
End: 2020-05-05
Payer: MEDICARE

## 2020-05-05 VITALS — WEIGHT: 220 LBS | BODY MASS INDEX: 29.8 KG/M2 | HEIGHT: 72 IN

## 2020-05-05 PROBLEM — J45.901 MODERATE ASTHMA WITH ACUTE EXACERBATION: Status: ACTIVE | Noted: 2020-05-05

## 2020-05-05 PROBLEM — K22.10 ULCER OF ESOPHAGUS WITHOUT BLEEDING: Status: ACTIVE | Noted: 2020-05-05

## 2020-05-05 PROCEDURE — 99443 PR PHYS/QHP TELEPHONE EVALUATION 21-30 MIN: CPT | Performed by: FAMILY MEDICINE

## 2020-05-05 RX ORDER — BUDESONIDE AND FORMOTEROL FUMARATE DIHYDRATE 160; 4.5 UG/1; UG/1
2 AEROSOL RESPIRATORY (INHALATION) 2 TIMES DAILY
Qty: 3 INHALER | Refills: 1 | Status: SHIPPED | OUTPATIENT
Start: 2020-05-05 | End: 2021-05-04 | Stop reason: SDUPTHER

## 2020-05-05 RX ORDER — PANTOPRAZOLE SODIUM 40 MG/1
40 TABLET, DELAYED RELEASE ORAL 2 TIMES DAILY
Qty: 60 TABLET | Refills: 2 | Status: SHIPPED | OUTPATIENT
Start: 2020-05-05 | End: 2020-10-06 | Stop reason: SDUPTHER

## 2020-05-05 RX ORDER — ASCORBIC ACID 500 MG
500 TABLET ORAL 2 TIMES DAILY
COMMUNITY

## 2020-05-05 RX ORDER — ACETAMINOPHEN 160 MG
1 TABLET,DISINTEGRATING ORAL DAILY
COMMUNITY

## 2020-05-05 ASSESSMENT — ENCOUNTER SYMPTOMS
SHORTNESS OF BREATH: 1
CHOKING: 0
WHEEZING: 1
CHEST TIGHTNESS: 0
COUGH: 1

## 2020-05-05 NOTE — PROGRESS NOTES
not taking: Reported on 4/15/2020) 1 Inhaler 3     No current facility-administered medications for this visit. Review of Systems   Constitutional: Negative for chills, diaphoresis, fatigue and fever. Respiratory: Positive for cough (urge in upper chest), shortness of breath and wheezing (in am). Negative for choking and chest tightness. Cardiovascular: Positive for palpitations (after coughing fit). Negative for chest pain and leg swelling. Genitourinary: Negative for scrotal swelling. Neurological: Positive for headaches (center of forehead after a coughing fit but goes quickly. ). Negative for weakness. Objective:   Physical Exam  Vitals:    05/05/20 1026   Weight: 220 lb (99.8 kg)  Comment: PT STATED   Height: 6' (1.829 m)     BP Readings from Last 3 Encounters:   03/11/20 (!) 156/94   03/02/20 128/87   03/02/20 (!) 152/97     Pulse Readings from Last 3 Encounters:   03/11/20 96   03/02/20 74   01/15/20 88     Wt Readings from Last 3 Encounters:   05/05/20 220 lb (99.8 kg)   03/02/20 230 lb 9 oz (104.6 kg)   01/15/20 232 lb (105.2 kg)     Body mass index is 29.84 kg/m². Assessment:      1. Cough    2. Moderate asthma with acute exacerbation, unspecified whether persistent    3. Ulcer of esophagus without bleeding    4. Essential hypertension    5. Iron deficiency anemia due to chronic blood loss          Plan:    PHONE 3600 Milly Bonilla is a 51 y.o. male evaluated via telephone on 5/5/2020. Patient Instructions     Damir Barriga was seen today for cough. Diagnoses and all orders for this visit:    Cough  Cause unknown. On the possibilities is reflux coming back up into the throat. When on acid reducing medications oftentimes there is no burning with reflux which may occur while laying down asleep. It is very important not to eat late at night to prevent aspiration into the lungs which can cause pneumonia and scar tissue to develop in the lungs leading to pulmonary fibrosis. The possibility of black mold in the lungs would require a bronchoscopy by a pulmonologist.  This is where the doctor looks through a scope into the lungs while you are sedated. They take a sample of the germs in the lungs to find possible causes for chronic cough. You should be able to see the pulmonologist (lung doctor) in person in a couple of weeks. If you are ready to do so please let our office know and we will give you the name of the person to contact. You can schedule a visit now even though you may not be able to see the doctor for a couple of weeks. Otherwise awaiting 10 maybe longer. Moderate asthma with acute exacerbation, unspecified whether persistent  -     budesonide-formoterol (SYMBICORT) 160-4.5 MCG/ACT AERO; Inhale 2 puffs into the lungs 2 times daily  Will always refill your inhalers but we do want to see you every 3 months if you are not well controlled with your asthma or cough. If you come in for some other cause such as a sinus infection or urinary tract infection we will not be able to adequately evaluate your asthma especially if you are having problems with it. Ulcer of esophagus without bleeding  -     pantoprazole (PROTONIX) 40 MG tablet; Take 1 tablet by mouth 2 times daily  By her symptoms it sounds like this is healing well. You can have reflux without an ulcer. Continue your current medication. Avoid late night eating. Should stop eating at least 3 hours prior to bedtime. Slowly sip water an hour or 2 after dinner but avoid drinking a large glass of water just before bed which can lead to reflux. Avoid all ibuprofen, naproxen (Aleve), and aspirin for pain. Tylenol safe when taken at the recommended dosages and will not cause ulcers. Weight loss will also help decrease reflux. If reflux causes bronchitis and you are treated with steroids and you gain more weight and it becomes a vicious cycle. Avoid the foods that tend to be into weight gain.   Try to slowly

## 2020-05-05 NOTE — PATIENT INSTRUCTIONS
seen every 3 months for your asthma, ulcer follow-up and hypertension routinely until all are well controlled and we can space visits out. Iron deficiency anemia due to chronic blood loss  Continue a diet high in iron. We will recheck your blood cell count at your next visit in 3 months.

## 2020-06-16 ENCOUNTER — OFFICE VISIT (OUTPATIENT)
Dept: ENT CLINIC | Age: 50
End: 2020-06-16
Payer: MEDICARE

## 2020-06-16 VITALS — SYSTOLIC BLOOD PRESSURE: 153 MMHG | TEMPERATURE: 98.4 F | DIASTOLIC BLOOD PRESSURE: 95 MMHG | HEART RATE: 85 BPM

## 2020-06-16 PROCEDURE — G8427 DOCREV CUR MEDS BY ELIG CLIN: HCPCS | Performed by: OTOLARYNGOLOGY

## 2020-06-16 PROCEDURE — 99213 OFFICE O/P EST LOW 20 MIN: CPT | Performed by: OTOLARYNGOLOGY

## 2020-06-16 PROCEDURE — G8417 CALC BMI ABV UP PARAM F/U: HCPCS | Performed by: OTOLARYNGOLOGY

## 2020-06-16 PROCEDURE — 96372 THER/PROPH/DIAG INJ SC/IM: CPT | Performed by: OTOLARYNGOLOGY

## 2020-06-16 PROCEDURE — 1036F TOBACCO NON-USER: CPT | Performed by: OTOLARYNGOLOGY

## 2020-06-16 RX ORDER — MONTELUKAST SODIUM 10 MG/1
10 TABLET ORAL NIGHTLY
Qty: 30 TABLET | Refills: 1 | Status: SHIPPED | OUTPATIENT
Start: 2020-06-16 | End: 2022-04-02 | Stop reason: SDUPTHER

## 2020-06-16 RX ORDER — METHYLPREDNISOLONE ACETATE 40 MG/ML
40 INJECTION, SUSPENSION INTRA-ARTICULAR; INTRALESIONAL; INTRAMUSCULAR; SOFT TISSUE ONCE
Status: COMPLETED | OUTPATIENT
Start: 2020-06-16 | End: 2020-06-16

## 2020-06-16 RX ADMIN — METHYLPREDNISOLONE ACETATE 40 MG: 40 INJECTION, SUSPENSION INTRA-ARTICULAR; INTRALESIONAL; INTRAMUSCULAR; SOFT TISSUE at 08:50

## 2020-06-16 NOTE — PROGRESS NOTES
Once again, the patient is complaining of extreme pressure sensation especially while being outside exerting himself. The pressure begins in the back of his neck and extends around to the frontal area and the facial region with constant pressure that lasts anywhere from 15 minutes to an hour. It is unassociated with nausea or other symptomatology except for some slight blurred vision. His CT scan indicated some mild edema of the sinuses back in March which would be more consistent with allergy. He did describe feeling much better after a Medrol Dosepak but that that symptomatology returned after about 3 weeks following the treatment. He has no family history of migraine and no other symptomatology related to migraine. No other medications have been given. He has used steroid spray in the past.  No fevers been noted. He has had some slight lightheadedness but no true vertigo. No other symptoms are noted. Currently, he appears in no obvious acute distress. Ear examination reveals normal findings with normal-appearing tympanic membranes and ear canals bilateral.  Oral examination is unremarkable. The neck is free of adenopathy, mass, thyroid enlargement. The nasal mucosa is edematous consistent with allergy but I see no evidence of purulent drainage nor significant obstruction. The neck is free of adenopathy, mass, thyroid enlargement. Extraocular movements are totally intact with no nystagmus. He was are equal round regular and reactive to light and accommodation. There are no focal neurologic signs noted. Findings, therefore, are likely those of allergy. I have suggested a trial of using his Flonase nasal spray and adding Singulair along with a Depo-Medrol injection. I have asked that he contact the office in 2 weeks. If symptoms seem better and continue to be better for an extended period of time, he may need a shot once a year.   However, his symptoms returned rather quickly, my next step would

## 2020-07-02 ENCOUNTER — TELEPHONE (OUTPATIENT)
Dept: ENT CLINIC | Age: 50
End: 2020-07-02

## 2020-07-02 RX ORDER — PREDNISONE 10 MG/1
TABLET ORAL
Qty: 25 TABLET | Refills: 0 | Status: SHIPPED | OUTPATIENT
Start: 2020-07-02 | End: 2020-07-15 | Stop reason: ALTCHOICE

## 2020-07-02 NOTE — TELEPHONE ENCOUNTER
No better. Therefore, as previously discussed, allergy testing are necessary and will give him a course of prednisone at the moment.

## 2020-07-10 ENCOUNTER — TELEPHONE (OUTPATIENT)
Dept: ENT CLINIC | Age: 50
End: 2020-07-10

## 2020-07-10 RX ORDER — AZELASTINE 1 MG/ML
1 SPRAY, METERED NASAL 2 TIMES DAILY
Qty: 1 BOTTLE | Refills: 0 | Status: SHIPPED | OUTPATIENT
Start: 2020-07-10 | End: 2022-04-02 | Stop reason: SDUPTHER

## 2020-07-10 RX ORDER — LEVOCETIRIZINE DIHYDROCHLORIDE 5 MG/1
5 TABLET, FILM COATED ORAL NIGHTLY
Qty: 30 TABLET | Refills: 1 | Status: SHIPPED | OUTPATIENT
Start: 2020-07-10 | End: 2020-07-15

## 2020-07-10 NOTE — TELEPHONE ENCOUNTER
Patient call back complaining, his symptoms got worse, and prednisone is making him nausea and wanted to know if he should stop taking them and what will be the next step, f/u or other treatment? Please advise.

## 2020-07-10 NOTE — TELEPHONE ENCOUNTER
Apparently cannot tolerate prednisone. He will need allergy testing and in the interim we can try Xyzal along with Astelin spray.

## 2020-07-14 ENCOUNTER — TELEPHONE (OUTPATIENT)
Dept: ENT CLINIC | Age: 50
End: 2020-07-14

## 2020-07-14 ENCOUNTER — CLINICAL DOCUMENTATION (OUTPATIENT)
Dept: ENT CLINIC | Age: 50
End: 2020-07-14

## 2020-07-14 NOTE — TELEPHONE ENCOUNTER
Please call pt regarding pre-auth for medication. His insurance carrier is requesting a letter.       Call back at [phone]

## 2020-07-15 ENCOUNTER — OFFICE VISIT (OUTPATIENT)
Dept: FAMILY MEDICINE CLINIC | Age: 50
End: 2020-07-15
Payer: MEDICARE

## 2020-07-15 VITALS
SYSTOLIC BLOOD PRESSURE: 130 MMHG | DIASTOLIC BLOOD PRESSURE: 82 MMHG | HEIGHT: 72 IN | WEIGHT: 234 LBS | TEMPERATURE: 97.2 F | RESPIRATION RATE: 16 BRPM | BODY MASS INDEX: 31.69 KG/M2 | HEART RATE: 75 BPM | OXYGEN SATURATION: 97 %

## 2020-07-15 PROCEDURE — 99215 OFFICE O/P EST HI 40 MIN: CPT | Performed by: FAMILY MEDICINE

## 2020-07-15 PROCEDURE — G8417 CALC BMI ABV UP PARAM F/U: HCPCS | Performed by: FAMILY MEDICINE

## 2020-07-15 PROCEDURE — G8427 DOCREV CUR MEDS BY ELIG CLIN: HCPCS | Performed by: FAMILY MEDICINE

## 2020-07-15 PROCEDURE — 1036F TOBACCO NON-USER: CPT | Performed by: FAMILY MEDICINE

## 2020-07-15 ASSESSMENT — ENCOUNTER SYMPTOMS
EYE DISCHARGE: 1
CHEST TIGHTNESS: 0
TROUBLE SWALLOWING: 0
WHEEZING: 0
SHORTNESS OF BREATH: 0
VOICE CHANGE: 1
EYE ITCHING: 1
RHINORRHEA: 0
CHOKING: 1
FACIAL SWELLING: 0
SINUS PAIN: 0
COUGH: 0
SINUS PRESSURE: 1
SORE THROAT: 1

## 2020-07-15 NOTE — PROGRESS NOTES
benign polyp    ENDOSCOPY, COLON, DIAGNOSTIC      NASAL ENDOSCOPY  2020    UPPER GASTROINTESTINAL ENDOSCOPY  3/2915    3 ulcers - peptic and esophageal    UPPER GASTROINTESTINAL ENDOSCOPY  2019    +ulcers    UPPER GASTROINTESTINAL ENDOSCOPY N/A 2019    EGD DIAGNOSTIC ONLY performed by Harshad Trejo MD at [address]  2019    Dr. Gricelda Matthew, ulcer healing in esophagitis, Parra's 1    UPPER GASTROINTESTINAL ENDOSCOPY N/A 2020    EGD CONTROL HEMORRHAGE performed by aRdha Lundy MD at [address] N/A 2020    EGD SUBMUCOSAL/ INJECTION OF EPINEPHRINE performed by Radha Lundy MD at [address] N/A 3/2/2020    EGD BIOPSY performed by Mynor Green MD at [address] [facility]  1983    and        Social History     Socioeconomic History    Marital status: Single     Spouse name: Not on file    Number of children: Not on file    Years of education: Not on file    Highest education level: Not on file   Occupational History    Occupation: FACTORY IN PAST     Comment: unemployed   Social Needs    Financial resource strain: Not on file    Food insecurity     Worry: Not on file     Inability: Not on file    Transportation needs     Medical: No     Non-medical: No   Tobacco Use    Smoking status: Former Smoker     Packs/day: 1.00     Years: 30.00     Pack years: 30.00     Types: Cigarettes     Start date: 1988     Last attempt to quit: 2019     Years since quittin.3    Smokeless tobacco: Never Used    Tobacco comment: 1 PPD (QUIT [id number])   Substance and Sexual Activity    Alcohol use: No     Comment: rare alcohol    Drug use: Yes     Types: Marijuana    Sexual activity: Yes   Lifestyle    Physical activity     Days per week: Not on file     Minutes per session: Not on file    Stress: Not on file Relationships    Social connections     Talks on phone: Not on file     Gets together: Not on file     Attends [employer] service: Not on file     Active member of club or organization: Not on file     Attends meetings of clubs or organizations: Not on file     Relationship status: Not on file    Intimate partner violence     Fear of current or ex partner: Not on file     Emotionally abused: Not on file     Physically abused: Not on file     Forced sexual activity: Not on file   Other Topics Concern    Not on file   Social History Narrative    Exercise - too light headed. 3/26/19. None. 1/15/20. Walks every am about 1.5 mi 7 days/wk. 4/14/20 2.3 mi every day. 5/4/20. Walks 2 mi/day. 7/15/20. Family History   Problem Relation Age of Onset    Other Mother         gall bladder    Allergies Mother     Heart Attack Father 52        x3-4    Coronary Art Dis Father     Alcohol Abuse Father     Allergies Father     Other Brother         congenital calcium deposits n bones    Allergies Brother     Cancer Maternal Aunt         brain    Cancer Maternal Grandmother     Cancer Maternal Grandfather     Asthma Paternal Grandfather     Other Maternal Aunt         lung issues    Hearing Loss Brother         congenital    Allergy (Severe) Brother     No Known Problems Brother         in a wreck as teen       Allergies   Allergen Reactions    Sudafed [Pseudoephedrine Hcl] Itching     Felt like itching under skin.       Current Outpatient Medications on File Prior to Visit   Medication Sig Dispense Refill    Zinc Acetate-Sweetleaf (FP ZINC LOZENGES PO) Take 1 lozenge by mouth 2 times daily      azelastine (ASTELIN) 0.1 % nasal spray 1 spray by Nasal route 2 times daily Use in each nostril as directed 1 Bottle 0    montelukast (SINGULAIR) 10 MG tablet Take 1 tablet by mouth nightly 30 tablet 1    vitamin C (ASCORBIC ACID) 500 MG tablet Take 500 mg by mouth 2 times daily      Cholecalciferol (VITAMIN D3) 50 MCG (2000 UT) CAPS Take 1 capsule by mouth      pantoprazole (PROTONIX) 40 MG tablet Take 1 tablet by mouth 2 times daily 60 tablet 2    budesonide-formoterol (SYMBICORT) 160-4.5 MCG/ACT AERO Inhale 2 puffs into the lungs 2 times daily 3 Inhaler 1    Multiple Vitamins-Minerals (THERAPEUTIC MULTIVITAMIN-MINERALS) tablet Take 1 tablet by mouth daily      albuterol sulfate HFA (PROVENTIL HFA) 108 (90 Base) MCG/ACT inhaler Inhale 2 puffs into the lungs every 4 hours as needed for Wheezing or Shortness of Breath Please provide pt c spacer as well 1 Inhaler 5     No current facility-administered medications on file prior to visit. Review of Systems   Constitutional: Negative for chills, diaphoresis, fatigue and fever. HENT: Positive for congestion (one nostril at times (either can be affected)), hearing loss (some days worse than others left more than right), postnasal drip, sinus pressure (bad behind eyes), sore throat (occasional), tinnitus and voice change. Negative for ear discharge, ear pain, facial swelling, mouth sores, nosebleeds, rhinorrhea, sinus pain, sneezing and trouble swallowing. Eyes: Positive for discharge (right eye) and itching. Respiratory: Positive for choking (on saliva (everyone in family)). Negative for cough, chest tightness, shortness of breath and wheezing. Cardiovascular: Positive for chest pain (left side of sternum -can occur just sitting.). Negative for leg swelling. Neurological: Positive for syncope (pre-syncope) and light-headedness. Negative for dizziness. Objective:   Physical Exam  Constitutional:       General: He is not in acute distress. Appearance: He is well-developed. He is not diaphoretic. HENT:      Right Ear: Tympanic membrane, ear canal and external ear normal. No middle ear effusion. Tympanic membrane is not injected, erythematous, retracted or bulging.       Left Ear: Tympanic membrane, ear canal and external ear normal.  No middle ear effusion. Tympanic membrane is not injected, erythematous, retracted or bulging. Nose: Mucosal edema present. No rhinorrhea. Right Sinus: No maxillary sinus tenderness or frontal sinus tenderness. Left Sinus: No maxillary sinus tenderness or frontal sinus tenderness. Mouth/Throat:      Mouth: Mucous membranes are not pale, not dry and not cyanotic. Pharynx: Uvula midline. No oropharyngeal exudate, posterior oropharyngeal erythema or uvula swelling. Tonsils: No tonsillar abscesses. Eyes:      General: No scleral icterus. Right eye: No discharge. Left eye: No discharge. Conjunctiva/sclera: Conjunctivae normal.      Right eye: Right conjunctiva is not injected. No exudate. Left eye: Left conjunctiva is not injected. No exudate. Neck:      Musculoskeletal: Neck supple. Thyroid: No thyroid mass or thyromegaly. Cardiovascular:      Rate and Rhythm: Normal rate and regular rhythm. Heart sounds: Normal heart sounds. No murmur. No friction rub. No gallop. Pulmonary:      Effort: Pulmonary effort is normal. No respiratory distress. Breath sounds: Normal breath sounds. No decreased breath sounds, wheezing, rhonchi or rales. Lymphadenopathy:      Head:      Right side of head: No submandibular or tonsillar adenopathy. Left side of head: No submandibular or tonsillar adenopathy. Cervical: No cervical adenopathy. Right cervical: No superficial, deep or posterior cervical adenopathy. Left cervical: No superficial, deep or posterior cervical adenopathy. Skin:     General: Skin is warm and dry. Coloration: Skin is not pale.        Vitals:    07/15/20 1504   BP: 130/82   Site: Right Upper Arm   Position: Sitting   Cuff Size: Medium Adult   Pulse: 75   Resp: 16   Temp: 97.2 °F (36.2 °C)   TempSrc: Infrared   SpO2: 97%   Weight: 234 lb (106.1 kg)   Height: 6' (1.829 m)     BP Readings from Last 3 Encounters:   07/15/20 130/82 06/16/20 (!) 153/95   03/11/20 (!) 156/94     Pulse Readings from Last 3 Encounters:   07/15/20 75   06/16/20 85   03/11/20 96     Wt Readings from Last 3 Encounters:   07/15/20 234 lb (106.1 kg)   05/05/20 220 lb (99.8 kg)   03/02/20 230 lb 9 oz (104.6 kg)     Body mass index is 31.74 kg/m². Assessment:      1. Recurrent occipital headache    2. Heat exhaustion, initial encounter          Plan:    412  - Counseling More Than 50% of the 40 min Appointment Time     Latonya Hatch was seen today for other. Diagnoses and all orders for this visit:    Recurrent occipital headache  -     Ambulatory referral to Neurology  \"Ocular migraine\"  Look this up. Good hydration helps keep migraines less frequent. Episodes of heat exhaustion will be decreased if on Topamax for prevention. Also importance of frequent small meals. Exercise helps continue daily walks. Have a backup plan if unable to walk outdoors due to weather. There are other medications used for prevention. Keeping blood pressures low is extremely helpful in preventing migraines. Heat exhaustion, initial encounter  Stressed the need to stay hydrated before going out and heat. When he constantly drinking this critical.  Should replace electrolytes with sports drinks if sweating profusely. After hydration is back to normal switch back to water as sodium and sports drinks will increase your blood pressure. Patient Education   Deciding About Taking Medicine to Prevent Migraines  How can you decide about taking medicine to prevent migraine headaches? What are migraines?     Patient Education   Migraine Aura Without a Headache: Care Instructions  Your Care Instructions    Patient Education   Heat Exhaustion: Care Instructions      Nevaeh Schumacher,

## 2020-07-15 NOTE — PATIENT INSTRUCTIONS
Golden Kiran was seen today for other. Diagnoses and all orders for this visit:    Recurrent occipital headache  -     Ambulatory referral to Neurology  \"Ocular migraine\"  Look this up. Heat exhaustion, initial encounter    Patient Education     Deciding About Taking Medicine to Prevent Migraines  How can you decide about taking medicine to prevent migraine headaches? What are migraines? Migraines are painful, throbbing headaches. They can last from 4 to 72 hours. They often occur on only one side of your head. But you may feel them on both sides. The pain may keep you from doing your daily activities. You may take a daily medicine if you get bad migraines often. This can help prevent them. What are key points about this decision? · Medicines to prevent migraines may not stop them every time. But if you take them daily, you can reduce how many migraines you get by more than half. They can also reduce how long migraines last. And your symptoms may not be as bad. · Medicines that prevent migraines may cause side effects. You may have sleep and memory problems, upset stomach, dry mouth, or constipation. Some of these side effects may last for as long as you take the medicine. Or they may go away within a few weeks. Why might you choose to take medicine to prevent migraines? · You are willing to take medicine daily if it will help your symptoms. · You don't think the side effects of the medicine could be as bad as your migraine symptoms. · Your migraines get in the way of your work. Or they harm your relationships with friends and family. · Benefits of medicine include fewer or no migraines. And your migraines may not last as long or feel as bad. Why might you choose not to take medicine to prevent migraines? · You want to avoid the side effects of the medicine. · You don't want to take medicine every day. · Your migraines are not affecting your work and relationships.   · If your symptoms don't improve with home treatment and other medicines, you can decide later to take medicine every day to help prevent migraines. Your decision  Thinking about the facts and your feelings can help you make a decision that is right for you. Be sure you understand the benefits and risks of your options, and think about what else you need to do before you make the decision. Where can you learn more? Go to https://chpepiceweb.HooftyMatch. org and sign in to your [employer] account. Enter R049 in the The Local box to learn more about \"Deciding About Taking Medicine to Prevent Migraines. \"     If you do not have an account, please click on the \"Sign Up Now\" link. Current as of: November 20, 2019               Content Version: 12.5  © 9943-7950 Healthwise, Incorporated. Care instructions adapted under license by [facility] ([place] [facility]). If you have questions about a medical condition or this instruction, always ask your healthcare professional. [place] [address] any warranty or liability for your use of this information. Patient Education   Migraine Aura Without a Headache: Care Instructions  Your Care Instructions     A migraine aura without a headache is a type of migraine. When you have an aura, you may see spots, wavy lines, or flashing lights. Your hands, arms, or face may tingle or feel numb. But unlike other migraines, a headache doesn't follow the aura. Some people have both types of migraines. Although they sometimes have an aura without the headache, at other times they may get a headache after an aura. Without treatment, migraines can last from 4 hours to a few days. Medicines can help prevent migraines or stop them once they have started. Your doctor can help you find which ones work best for you. Follow-up care is a key part of your treatment and safety. Be sure to make and go to all appointments, and call your doctor if you are having problems.  It's also a good idea to know your test results and keep a list of the medicines you take. How can you care for yourself at home? · Do not drive if you have taken a prescription pain medicine. · Rest in a quiet, dark room until your aura or headache is gone. Close your eyes. Try to relax or go to sleep. Don't watch TV or read. · If you get a headache, put a cold, moist cloth or cold pack on the painful area for 10 to 20 minutes at a time. Put a thin cloth between the cold pack and your skin. · Use a warm, moist towel or a heating pad set on low. This can relax tight shoulder and neck muscles. · Have someone gently massage your neck and shoulders. · Be safe with medicines. Take your medicines exactly as prescribed. Call your doctor if you think you are having a problem with your medicine. You will get more details on the specific medicines your doctor prescribes. · Don't take medicine for headache pain too often. Talk to your doctor if you are taking medicine more than 2 days a week to stop a headache. Taking too much pain medicine can lead to more headaches. These are called medicine-overuse headaches. To prevent migraines  · Keep a diary so you can figure out what triggers your auras or headaches. Avoiding triggers may help you prevent migraines. Record when each aura or headache began, how long it lasted, and what the symptoms were like. Write down any other symptoms you had with the aura. These may include nausea or sensitivity to bright light or loud noise. Note if the aura or headache occurred near your period. List anything that might have triggered the aura. Triggers may include certain foods (chocolate, cheese, wine) or odors, smoke, bright light, stress, or lack of sleep. · If your doctor has prescribed medicine for your migraines, take it as directed. You may have medicine that you take only when you get a migraine and medicine that you take all the time to help prevent migraines.   ? If your doctor has prescribed medicine for when you get migraines, take it at the first sign of an aura, unless your doctor has given you other instructions. ? If your doctor has prescribed medicine to prevent migraines, take it exactly as prescribed. Call your doctor if you think you are having a problem with your medicine. · Find healthy ways to deal with stress. Migraines are most common during or right after stressful times. Try finding ways to reduce stress like practicing mindfulness or deep breathing exercises. · Get plenty of sleep and exercise. But be careful to not push yourself too hard during exercise. It may trigger a headache. · Eat regular meals, and avoid foods and drinks that often trigger migraines. These include chocolate and alcohol, especially red wine and port. Chemicals used in food, such as aspartame and monosodium glutamate (MSG), also can trigger migraines. So can some food additives, such as those found in hot dogs, prather, cold cuts, aged cheeses, and pickled foods. · Limit caffeine by not drinking too much coffee, tea, or soda. But don't quit caffeine suddenly, because that can also give you migraines. · Do not smoke or allow others to smoke around you. If you need help quitting, talk to your doctor about stop-smoking programs and medicines. These can increase your chances of quitting for good. · If you are taking birth control pills or hormone therapy, talk to your doctor about whether they are triggering your migraines. When should you call for help? [id number] anytime you think you may need emergency care. For example, call if:  · You have symptoms of a stroke. These may include:  ? Sudden numbness, tingling, weakness, or loss of movement in your face, arm, or leg, especially on only one side of your body. ? Sudden vision changes. ? Sudden trouble speaking. ? Sudden confusion or trouble understanding simple statements. ? Sudden problems with walking or balance.   ? A sudden, severe headache that is different from past headaches. Call your doctor now or seek immediate medical care if:  · You have new or worse nausea and vomiting. · You have a new or higher fever. · Your headache gets much worse. Watch closely for changes in your health, and be sure to contact your doctor if:  · You are not getting better after 2 days (48 hours). Where can you learn more? Go to https://chpepiceweb.[employer]. org and sign in to your [employer] account. Enter 415 [id number] in the MyBeautyCompare box to learn more about \"Migraine Aura Without a Headache: Care Instructions. \"     If you do not have an account, please click on the \"Sign Up Now\" link. Current as of: November 20, 2019               Content Version: 12.5  © 1702-1077 Healthwise, Incorporated. Care instructions adapted under license by [facility] ([facility]). If you have questions about a medical condition or this instruction, always ask your healthcare professional. [place] [address] any warranty or liability for your use of this information. Patient Education   Heat Exhaustion: Care Instructions  Your Care Instructions  Heat exhaustion occurs when you are hot, sweat a lot, and do not drink enough to replace the lost fluids. Heat exhaustion is not the same as heatstroke, which is much more serious. Heatstroke can lead to problems with many different organs and can be life-threatening. After medical care for heat exhaustion, you will need to limit your activities and take good care of your body while it recovers. Follow-up care is a key part of your treatment and safety. Be sure to make and go to all appointments, and call your doctor if you are having problems. It's also a good idea to know your test results and keep a list of the medicines you take. How can you care for yourself at home? · Reduce your activities, and get plenty of rest. Your doctor will give you instructions on when you can resume your normal schedule.   · Stay in a cool room for at least the help?   [id number] anytime you think you may need emergency care. For example, call if:  · You feel very hot and:  ? You have a seizure. ? You feel confused. ? Your skin is red, hot, and dry. ? You passed out (lost consciousness). Call your doctor now or seek immediate medical care if:  · You cannot keep fluids down. · After returning to your normal activities, you have symptoms of heat exhaustion, such as sweating a lot, fatigue, dizziness, or nausea. Watch closely for changes in your health, and be sure to contact your doctor if:  · You do not get better as expected. Where can you learn more? Go to https://SendinBluepeKeelreb.Just Be Friends. org and sign in to your [employer] Path account. Enter S222 in the [employer] box to learn more about \"Heat Exhaustion: Care Instructions. \"     If you do not have an account, please click on the \"Sign Up Now\" link. Current as of: June 26, 2019               Content Version: 12.5  © 8410-2345 Healthwise, Incorporated. Care instructions adapted under license by [facility]. If you have questions about a medical condition or this instruction, always ask your healthcare professional. [place] [address] any warranty or liability for your use of this information.

## 2020-07-30 ENCOUNTER — TELEPHONE (OUTPATIENT)
Dept: ENT CLINIC | Age: 50
End: 2020-07-30

## 2020-08-07 RX ORDER — LISINOPRIL 20 MG/1
TABLET ORAL
Qty: 15 TABLET | Refills: 0 | Status: SHIPPED | OUTPATIENT
Start: 2020-08-07 | End: 2020-09-08

## 2020-09-08 RX ORDER — LISINOPRIL 20 MG/1
TABLET ORAL
Qty: 45 TABLET | Refills: 0 | Status: SHIPPED | OUTPATIENT
Start: 2020-09-08 | End: 2020-12-07

## 2020-09-11 ENCOUNTER — OFFICE VISIT (OUTPATIENT)
Dept: NEUROLOGY | Age: 50
End: 2020-09-11
Payer: MEDICARE

## 2020-09-11 VITALS
SYSTOLIC BLOOD PRESSURE: 144 MMHG | DIASTOLIC BLOOD PRESSURE: 97 MMHG | HEART RATE: 83 BPM | HEIGHT: 72 IN | WEIGHT: 234 LBS | BODY MASS INDEX: 31.69 KG/M2

## 2020-09-11 PROCEDURE — G8417 CALC BMI ABV UP PARAM F/U: HCPCS | Performed by: PSYCHIATRY & NEUROLOGY

## 2020-09-11 PROCEDURE — 99244 OFF/OP CNSLTJ NEW/EST MOD 40: CPT | Performed by: PSYCHIATRY & NEUROLOGY

## 2020-09-11 PROCEDURE — G8427 DOCREV CUR MEDS BY ELIG CLIN: HCPCS | Performed by: PSYCHIATRY & NEUROLOGY

## 2020-09-11 NOTE — PROGRESS NOTES
NEUROLOGY CONSULTATION     Chief Complaint   Patient presents with   [facility] Patient     Dr. Uriel Aguilar for occiptal headaches, off and on for a couple years       HISTORY OF PRESENT ILLNESS :    Loyda Hayes is a 51 y.o. male who is referred by Dr. Uriel Aguilar  History was obtained from patient  Patient was referred for evaluation of pain in the occipital region with spread to the front of the head. He also gets somewhat dizzy and lightheaded with this. He states he has had previous vertigo but no vertigo associated with the symptoms. Symptoms started about 2 years ago. Onset was gradual.  Symptoms are slowly progressive and now he gets them almost on a daily basis. Patient states that he was treated with antibiotics and steroids by ENT which seemed to help some for a few months but then the symptoms came back. Patient has had an MRI brain and CT scans done. Patient quit smoking in 2019. No other focal neurological symptoms  Patient snores during sleep. He does not feel rested when he wakes up in the morning and if daytime sleepiness. He wakes up feeling breathless in the middle of the night.     REVIEW OF SYSTEMS    Constitutional:  []   Chills   []  Fatigue   []  Fevers   []  Malaise   []  Weight loss     [x] Denies all of the above    Eyes:  []  Double vision   [x]  Blurry vision     [] Denies all of the above    Ears, nose, mouth, throat, and face:   [] Hearing loss    []   Hoarseness      [x]  Snoring    [x]  Tinnitus       [] Denies all of the above     Respiratory:   []  Cough    []  Shortness of breath         [x] Denies all of the above     Cardiovascular:   []  Chest pain    []  Exertional chest pressure/discomfort           [] Palpitations    []  Syncope     [x] Denies all of the above    Gastrointestinal:   []  Abdominal pain   []  Constipation    []  Diarrhea    []   Dysphagia                      [x] Denies all of the  Years of education: None    Highest education level: None   Occupational History    Occupation: FACTORY IN PAST     Comment: unemployed   Social Needs    Financial resource strain: None    Food insecurity     Worry: None     Inability: None    Transportation needs     Medical: No     Non-medical: No   Tobacco Use    Smoking status: Former Smoker     Packs/day: 1.00     Years: 30.00     Pack years: 30.00     Types: Cigarettes     Start date: 1988     Last attempt to quit: 2019     Years since quittin.3    Smokeless tobacco: Never Used    Tobacco comment: 1 PPD (QUIT [id number])   Substance and Sexual Activity    Alcohol use: No     Comment: rare alcohol    Drug use: Yes     Types: Marijuana    Sexual activity: Yes   Lifestyle    Physical activity     Days per week: None     Minutes per session: None    Stress: None   Relationships    Social connections     Talks on phone: None     Gets together: None     Attends Sikh service: None     Active member of club or organization: None     Attends meetings of clubs or organizations: None     Relationship status: None    Intimate partner violence     Fear of current or ex partner: None     Emotionally abused: None     Physically abused: None     Forced sexual activity: None   Other Topics Concern    None   Social History Narrative    Exercise - too light headed. 3/26/19. None. 1/15/20. Walks every am about 1.5 mi 7 days/wk. 20 2.3 mi every day. 20. Walks 2 mi/day. 7/15/20. PHYSICAL EXAMINATION:  BP (!) 144/97   Pulse 83   Ht 6' (1.829 m)   Wt 234 lb (106.1 kg)   BMI 31.74 kg/m²   Appearance: Well appearing, well nourished and in no distress  Mental Status Exam: Patient is alert, oriented to person, place and time.    Recent and remote memory is normal  Fund of Knowledge is normal  Attention/concentration is normal.   Speech : No dysarthria  Language : No aphasia  Funduscopic Exam: sharp disc margins  Cranial Nerves:   II: Visual fields:  Full to confrontation  III: Pupils:  equal, round, reactive to light  III,IV,VI: Extra Ocular Movements are intact. No nystagmus  V: Facial sensation is intact to pin prick and light touch  VII: Facial strength and movements: intact and symmetric smile,cheek puffing and eyebrow elevation  VIII: Hearing:  Intact to finger rub bilaterally  IX: Palate  elevation is symmetric  XI: Shoulder shrug is intact  XII: Tongue movements are normal  Motor:  Muscle tone and bulk are normal.   Strength is symmetrical 5/5 in all four extremities. Sensory: Intact to light touch and  pin prick in all four extremities  Coordination:  Normal  Finger to Nose and Heel to Shin bilaterally    . Reflexes:  DTR +2 and symmetric bilaterally  Plantar response: Flexor bilaterally  Gait: Gait and station is normal. Patient can toe/ heel and tandem walk without difficulty  Romberg: negative  Vascular: No carotid bruit bilaterally        DATA:  LABS:  General Labs:    CBC:   Lab Results   Component Value Date    WBC 9.0 01/08/2020    RBC 4.13 01/08/2020    HGB 11.6 01/08/2020    HCT 34.8 01/08/2020    MCV 84.4 01/08/2020    MCH 28.1 01/08/2020    MCHC 33.3 01/08/2020    RDW 14.8 01/08/2020     01/08/2020    MPV 8.2 01/08/2020     BMP:    Lab Results   Component Value Date     01/08/2020    K 4.1 01/08/2020    K 3.9 01/07/2020     01/08/2020    CO2 21 01/08/2020    BUN 15 01/08/2020    LABALBU 4.5 01/06/2020    CREATININE 0.9 01/08/2020    CALCIUM 9.2 01/08/2020    GFRAA >60 01/08/2020    LABGLOM >60 01/08/2020    GLUCOSE 105 01/08/2020     RADIOLOGY REVIEW:  I have reviewed radiology image(s) and reports(s) of: MRI brain    IMPRESSION :  I suspect patient has obstructive sleep apnea. He snores during sleep has daytime sleepiness and does not feel rested in the mornings. He also has daytime fatigue. This might explain his headaches.   Patient also has mild tonsillar ectopia with the cerebellum protruding about 5

## 2020-09-14 ENCOUNTER — OFFICE VISIT (OUTPATIENT)
Dept: PULMONOLOGY | Age: 50
End: 2020-09-14
Payer: MEDICARE

## 2020-09-14 VITALS
HEART RATE: 73 BPM | BODY MASS INDEX: 31.15 KG/M2 | SYSTOLIC BLOOD PRESSURE: 153 MMHG | RESPIRATION RATE: 18 BRPM | OXYGEN SATURATION: 98 % | DIASTOLIC BLOOD PRESSURE: 84 MMHG | WEIGHT: 230 LBS | HEIGHT: 72 IN

## 2020-09-14 PROBLEM — J30.1 NON-SEASONAL ALLERGIC RHINITIS DUE TO POLLEN: Status: ACTIVE | Noted: 2020-09-14

## 2020-09-14 PROBLEM — E66.09 CLASS 1 OBESITY DUE TO EXCESS CALORIES WITHOUT SERIOUS COMORBIDITY WITH BODY MASS INDEX (BMI) OF 31.0 TO 31.9 IN ADULT: Status: ACTIVE | Noted: 2020-09-14

## 2020-09-14 PROBLEM — G47.33 OBSTRUCTIVE SLEEP APNEA SYNDROME: Status: ACTIVE | Noted: 2020-09-14

## 2020-09-14 PROBLEM — E66.811 CLASS 1 OBESITY DUE TO EXCESS CALORIES WITHOUT SERIOUS COMORBIDITY WITH BODY MASS INDEX (BMI) OF 31.0 TO 31.9 IN ADULT: Status: ACTIVE | Noted: 2020-09-14

## 2020-09-14 PROCEDURE — G8428 CUR MEDS NOT DOCUMENT: HCPCS | Performed by: INTERNAL MEDICINE

## 2020-09-14 PROCEDURE — 99204 OFFICE O/P NEW MOD 45 MIN: CPT | Performed by: INTERNAL MEDICINE

## 2020-09-14 PROCEDURE — 1036F TOBACCO NON-USER: CPT | Performed by: INTERNAL MEDICINE

## 2020-09-14 PROCEDURE — G8417 CALC BMI ABV UP PARAM F/U: HCPCS | Performed by: INTERNAL MEDICINE

## 2020-09-14 ASSESSMENT — SLEEP AND FATIGUE QUESTIONNAIRES
HOW LIKELY ARE YOU TO NOD OFF OR FALL ASLEEP WHILE SITTING QUIETLY AFTER LUNCH WITHOUT ALCOHOL: 0
HOW LIKELY ARE YOU TO NOD OFF OR FALL ASLEEP IN A CAR, WHILE STOPPED FOR A FEW MINUTES IN TRAFFIC: 0
HOW LIKELY ARE YOU TO NOD OFF OR FALL ASLEEP WHILE LYING DOWN TO REST IN THE AFTERNOON WHEN CIRCUMSTANCES PERMIT: 0
HOW LIKELY ARE YOU TO NOD OFF OR FALL ASLEEP WHILE SITTING AND TALKING TO SOMEONE: 0
NECK CIRCUMFERENCE (INCHES): 17
HOW LIKELY ARE YOU TO NOD OFF OR FALL ASLEEP WHILE SITTING INACTIVE IN A PUBLIC PLACE: 0
HOW LIKELY ARE YOU TO NOD OFF OR FALL ASLEEP WHILE SITTING AND READING: 3
HOW LIKELY ARE YOU TO NOD OFF OR FALL ASLEEP WHEN YOU ARE A PASSENGER IN A CAR FOR AN HOUR WITHOUT A BREAK: 0
HOW LIKELY ARE YOU TO NOD OFF OR FALL ASLEEP WHILE WATCHING TV: 0
ESS TOTAL SCORE: 3

## 2020-09-14 NOTE — PROGRESS NOTES
Denies sinus pain. No hoarseness, or dysphagia. NECK: Patient denies swelling in the neck. CARDIOVASCULAR: Denies chest pain, palpitation, syncope. RESPIRATORY: As per HPI. GASTROINTESTINAL: Denies nausea, abdominal pain or change in bowel function. GENITOURINARY: Denies obstructive symptoms. No history of incontinence. BREASTS: No masses or lumps in the breasts. SKIN: No rashes or itching. MUSCULOSKELETAL: Denies weakness or bone pain. NEUROLOGICAL: No headaches or seizures. PSYCHIATRIC: Denies mood swings or depression. ENDOCRINE: Denies heat or cold intolerance or excessive thirst.  HEMATOLOGIC/LYMPHATIC: Denies easy bruising or lymph node swelling. ALLERGIC/IMMUNOLOGIC: No environmental allergies. PAST MEDICAL HISTORY:   Past Medical History:   Diagnosis Date    Anxiety     Asthma     Closed fracture of head of fifth metacarpal     Boxer's fracture    Former smoker 05/2019    started age 17, quit age 53    Fracture of finger of right hand     And knuckle    History of blood transfusion     new this admission 6/10/19    Hypertension     Iron deficiency anemia due to chronic blood loss 10/31/2019    DUE TO GI BLEED/ULCERS    Peptic ulcer 01/01/2015    EGD 3/2/2020: Distal circumferentialesophageal ulcer and distal Parra's.   Gastric body and antrum gastritis       PAST SURGICAL HISTORY:   Past Surgical History:   Procedure Laterality Date    COLONOSCOPY  03/2015    1 benign polyp    ENDOSCOPY, COLON, DIAGNOSTIC      NASAL ENDOSCOPY  03/11/2020    UPPER GASTROINTESTINAL ENDOSCOPY  3/2915    3 ulcers - peptic and esophageal    UPPER GASTROINTESTINAL ENDOSCOPY  06/2019    +ulcers    UPPER GASTROINTESTINAL ENDOSCOPY N/A 6/11/2019    EGD DIAGNOSTIC ONLY performed by Landen Troncoso MD at [facility]  08/19/2019    Dr. Tapan Sanders, ulcer healing in esophagitis, Parra's 1    UPPER GASTROINTESTINAL ENDOSCOPY N/A 1/6/2020    EGD CONTROL HEMORRHAGE performed by Alejo Lanes, MD at [address] [facility] N/A 2020    EGD SUBMUCOSAL/ INJECTION OF EPINEPHRINE performed by Alejo Lanes, MD at [address] [facility] N/A 3/2/2020    EGD BIOPSY performed by Candie Hoffman MD at [address] [facility]  1983    and        SOCIAL HISTORY:   Social History     Tobacco Use    Smoking status: Former Smoker     Packs/day: 1.50     Years: 35.00     Pack years: 52.50     Types: Cigarettes     Start date: 1988     Last attempt to quit: 2019     Years since quittin.3    Smokeless tobacco: Former User     Types: Snuff    Tobacco comment: started to smoke at 16 / smoked up to 1.5 ppd / smoked for 35 yrs on and off / 1 PPD (QUIT [id number])   Substance Use Topics    Alcohol use: No     Comment: rare alcohol    Drug use: Yes     Types: Marijuana       FAMILY HISTORY:   Family History   Problem Relation Age of Onset    Other Mother         gall bladder    Allergies Mother     Heart Attack Father 52        x3-4    Coronary Art Dis Father     Alcohol Abuse Father     Allergies Father     Other Brother         congenital calcium deposits n bones    Allergies Brother     Cancer Maternal Aunt         brain    Cancer Maternal Grandmother     Cancer Maternal Grandfather     Asthma Paternal Grandfather     Other Maternal Aunt         lung issues    Hearing Loss Brother         congenital    Allergy (Severe) Brother     No Known Problems Brother         in a wreck as teen       MEDICATIONS:     Current Outpatient Medications on File Prior to Visit   Medication Sig Dispense Refill    lisinopril (PRINIVIL;ZESTRIL) 20 MG tablet Take 1/2 (one-half) tablet by mouth once daily 45 tablet 0    metoprolol tartrate (LOPRESSOR) 25 MG tablet Take 1 tablet by mouth twice daily 180 tablet 0    Zinc Acetate-Sweetleaf (FP ZINC LOZENGES PO) Take 1 lozenge by sounds in all quadrants, non-distended, without hepatosplenomegaly. GENITOURINARY: Deferred. MUSCULOSKELETAL: No tenderness to palpation of the axial skeleton. There is no clubbing. No cyanosis. No edema of the lower extremities. SKIN OF BODY: No rash or jaundice. PSYCHIATRIC EVALUATION: Normal affect. Patient answers questions appropriately. HEMATOLOGIC/LYMPHATIC/ IMMUNOLOGIC: No palpable lymphadenopathy. NEUROLOGIC: Alert and oriented x 3. Groslly non-focal. Motor strength is 5+/5 in all muscle groups. The patient has a normal sensorium globally. LABS:    Lab Summary Latest Ref Rng & Units 1/8/2020 1/7/2020 1/7/2020   WBC 4.0 - 11.0 K/uL 9.0 9.3 -   Hgb 13.5 - 17.5 g/dL 11. 6(L) 11. 2(L) 11. 5(L)   Hct 40.5 - 52.5 % 34. 8(L) 33. 6(L) -   Platelets 631 - 149 K/uL 304 268 -   Sodium 136 - 145 mmol/L 138 139 -   Potassium 3.5 - 5.1 mmol/L 4.1 3.9 -   BUN 7 - 20 mg/dL 15 20 -   Creatinine 0.9 - 1.3 mg/dL 0.9 0.9 -   Glucose 70 - 99 mg/dL 105(H) 97 -   Calcium 8.3 - 10.6 mg/dL 9.2 8.3 -   Alk Phos 40 - 129 U/L - - -   Albumin 3.4 - 5.0 g/dL - - -   Bilirubin 0.0 - 1.0 mg/dL - - -   AST 15 - 37 U/L - - -   ALT 10 - 40 U/L - - -   Some recent data might be hidden       IMAGING:      Pulmonary Functions Testing Results:          IMPRESSION:     1. Suspicion for obstructive sleep apnea. 2.  Frequent headaches. 3.  Anxiety  4. Obesity  5. Asthma  6. Allergic rhinitis. RECOMMENDATION:     1. Patient has poor quality sleep, witnessed snoring, gasping, choking at nighttime. His Ramsey sleepiness score is high and neck circumference is 17 inches. All these features raises suspicion for obstructive sleep apnea. With his weight gain his sleep apnea may be getting worse leading to somatic manifestations such as headaches. I will order for an in lab polysomnography to evaluate this diagnosis and assess its severity. Patient may also need a CPAP titration study. 2.  Advised patient to lose weight.   If he

## 2020-09-29 ENCOUNTER — TELEPHONE (OUTPATIENT)
Dept: PULMONOLOGY | Age: 50
End: 2020-09-29

## 2020-09-29 ENCOUNTER — TELEPHONE (OUTPATIENT)
Dept: SLEEP CENTER | Age: 50
End: 2020-09-29

## 2020-09-29 NOTE — TELEPHONE ENCOUNTER
[facility], just wanted to make sure you have order and see if you can contact patient? Thanks you!

## 2020-09-29 NOTE — TELEPHONE ENCOUNTER
The phone number from chart just rings. No one answered and I let it ring many times/ could not leave a vm. Will try again later.

## 2020-09-29 NOTE — TELEPHONE ENCOUNTER
Darlene Crane,   I just called and spoke with Jeannie Malcolm -he was fine with scheduling an over night sleep study but when I said we needed to schedule a covid test -he became very annoyed and didn't schedule anything. He didn't understand why I needed to do this - I just explained that's part of any outpatient service and I needed to do that -it was required to come here for a sleep study, etc.   He said no thanks -not right now. And I told him I'd let you guys know that we did try to schedule this sleep study. Sorry and thanks!   Madisyn Barcenas

## 2020-09-29 NOTE — TELEPHONE ENCOUNTER
I have ordered for a home sleep study. He will not have to do a COVID testing for this. Please inform him about the change of the test. Thanks.

## 2020-09-29 NOTE — TELEPHONE ENCOUNTER
Pt called in stating he has not heard anything about his sleep study yet. Offered the Pt the phone number for the sleep lab to call and get it scheduled, he refused and stated it was our responsibility.      Pt # [phone]

## 2020-09-29 NOTE — TELEPHONE ENCOUNTER
Called pt to schedule psg per Dr. Mustapha Lagunas. The phone number from chart just rings. No one answered and I let it ring many times/ could not leave a vm. Will try again later.

## 2020-10-06 ENCOUNTER — OFFICE VISIT (OUTPATIENT)
Dept: FAMILY MEDICINE CLINIC | Age: 50
End: 2020-10-06
Payer: MEDICARE

## 2020-10-06 VITALS
RESPIRATION RATE: 20 BRPM | TEMPERATURE: 97.2 F | OXYGEN SATURATION: 98 % | SYSTOLIC BLOOD PRESSURE: 126 MMHG | DIASTOLIC BLOOD PRESSURE: 84 MMHG | HEART RATE: 82 BPM | BODY MASS INDEX: 31.56 KG/M2 | WEIGHT: 233 LBS | HEIGHT: 72 IN

## 2020-10-06 PROCEDURE — G8427 DOCREV CUR MEDS BY ELIG CLIN: HCPCS | Performed by: FAMILY MEDICINE

## 2020-10-06 PROCEDURE — G8417 CALC BMI ABV UP PARAM F/U: HCPCS | Performed by: FAMILY MEDICINE

## 2020-10-06 PROCEDURE — 1036F TOBACCO NON-USER: CPT | Performed by: FAMILY MEDICINE

## 2020-10-06 PROCEDURE — G8484 FLU IMMUNIZE NO ADMIN: HCPCS | Performed by: FAMILY MEDICINE

## 2020-10-06 PROCEDURE — 99214 OFFICE O/P EST MOD 30 MIN: CPT | Performed by: FAMILY MEDICINE

## 2020-10-06 RX ORDER — LORAZEPAM 0.5 MG/1
0.5 TABLET ORAL DAILY
Qty: 30 TABLET | Refills: 2 | Status: SHIPPED | OUTPATIENT
Start: 2020-10-06 | End: 2021-01-03 | Stop reason: SDUPTHER

## 2020-10-06 RX ORDER — PANTOPRAZOLE SODIUM 40 MG/1
40 TABLET, DELAYED RELEASE ORAL 2 TIMES DAILY
Qty: 60 TABLET | Refills: 2 | Status: SHIPPED | OUTPATIENT
Start: 2020-10-06 | End: 2021-01-04 | Stop reason: SDUPTHER

## 2020-10-06 RX ORDER — ALBUTEROL SULFATE 90 UG/1
2 AEROSOL, METERED RESPIRATORY (INHALATION) EVERY 4 HOURS PRN
Qty: 1 INHALER | Refills: 5 | Status: SHIPPED | OUTPATIENT
Start: 2020-10-06 | End: 2021-05-04 | Stop reason: SDUPTHER

## 2020-10-06 ASSESSMENT — ENCOUNTER SYMPTOMS
SHORTNESS OF BREATH: 0
COUGH: 0
CHOKING: 0
WHEEZING: 1
CHEST TIGHTNESS: 1

## 2020-10-06 NOTE — PROGRESS NOTES
Subjective:      Patient ID: Rosana Herron is a 51 y.o. male. Chief Complaint   Patient presents with    Hypertension     PT HERE FOR ROUTINE FOLLOW UP ON HTN    Anxiety     PT HERE FOR ROUTINE FOLLOW UP ON ANXIETY   0  HPI    Essential hypertension  Is not checking at home. Forgot home monitor. It was way off. Is not adding salt. Does eat occasional chips. No restaurant food. Ulcer of esophagus without bleeding  No heartburn/reflux. Has pain slightly left center. Thinks due to ulcer in esophagus. When has chip or junk food hits him. Only Tylenol for pain. He usually dinner at 5-6 pm.  Bed at 10 . Moderate asthma with acute exacerbation, unspecified whether persistent  Somedays feels like breathing fine but not enough air in lungs. Anxiety  Was not controlled till last 3 weeks. Couldn't drive. Had panic attacks when walked. Sleep   Was told he had a narrow airway with tonsils and adenoids enlarged. He snores. Not getting restful sleep. Neurology  Seen and told he had low lying cerebellar tonsils. He was recommended to have a sleep study. Doesn't want to do the COVID-19 test so planning a home sleep study. He started the whole process because he felt pressure behind the eyes and had a vertigo like feeling starting 2 yrs ago. For 3-4 weeks has all gotten better. Past Medical History:   Diagnosis Date    Anxiety     Asthma     Closed fracture of head of fifth metacarpal     Boxer's fracture    Former smoker 05/2019    started age 17, quit age 53    Fracture of finger of right hand     And knuckle    History of blood transfusion     new this admission 6/10/19    Hypertension     Iron deficiency anemia due to chronic blood loss 10/31/2019    DUE TO GI BLEED/ULCERS    Peptic ulcer 01/01/2015    EGD 3/2/2020: Distal circumferentialesophageal ulcer and distal Parra's.   Gastric body and antrum gastritis       Past Surgical History:   Procedure Laterality Date    COLONOSCOPY PO) Take 1 lozenge by mouth 2 times daily      azelastine (ASTELIN) 0.1 % nasal spray 1 spray by Nasal route 2 times daily Use in each nostril as directed 1 Bottle 0    montelukast (SINGULAIR) 10 MG tablet Take 1 tablet by mouth nightly 30 tablet 1    vitamin C (ASCORBIC ACID) 500 MG tablet Take 500 mg by mouth 2 times daily      Cholecalciferol (VITAMIN D3) 50 MCG (2000 UT) CAPS Take 1 capsule by mouth      pantoprazole (PROTONIX) 40 MG tablet Take 1 tablet by mouth 2 times daily 60 tablet 2    budesonide-formoterol (SYMBICORT) 160-4.5 MCG/ACT AERO Inhale 2 puffs into the lungs 2 times daily 3 Inhaler 1    Multiple Vitamins-Minerals (THERAPEUTIC MULTIVITAMIN-MINERALS) tablet Take 1 tablet by mouth daily      albuterol sulfate HFA (PROVENTIL HFA) 108 (90 Base) MCG/ACT inhaler Inhale 2 puffs into the lungs every 4 hours as needed for Wheezing or Shortness of Breath Please provide pt c spacer as well 1 Inhaler 5     No current facility-administered medications for this visit. Review of Systems   Respiratory: Positive for chest tightness and wheezing. Negative for cough, choking and shortness of breath. Cardiovascular: Positive for chest pain (from esophageal ulcer he thinks). Negative for palpitations and leg swelling. Neurological: Positive for headaches (for 4- 5days). Negative for dizziness and light-headedness. Objective:   Physical Exam  Vitals signs and nursing note reviewed. Constitutional:       General: He is not in acute distress. Appearance: He is well-developed. He is not diaphoretic. Eyes:      General: No scleral icterus. Right eye: No discharge. Left eye: No discharge. Conjunctiva/sclera: Conjunctivae normal.   Neck:      Musculoskeletal: Neck supple. Thyroid: No thyroid mass or thyromegaly. Vascular: No carotid bruit or JVD. Trachea: Trachea and phonation normal. No tracheal tenderness or tracheal deviation.    Cardiovascular:      Rate and Rhythm: Normal rate and regular rhythm. Heart sounds: Normal heart sounds, S1 normal and S2 normal. Heart sounds not distant. No murmur. No friction rub. No gallop. No S3 or S4 sounds. Pulmonary:      Effort: Pulmonary effort is normal. No respiratory distress. Breath sounds: Normal breath sounds. No stridor. No decreased breath sounds, wheezing, rhonchi or rales. Lymphadenopathy:      Head:      Right side of head: No submandibular or tonsillar adenopathy. Left side of head: No submandibular or tonsillar adenopathy. Cervical: No cervical adenopathy. Right cervical: No superficial, deep or posterior cervical adenopathy. Left cervical: No superficial, deep or posterior cervical adenopathy. Skin:     General: Skin is warm and dry. Coloration: Skin is not pale. Neurological:      Mental Status: He is alert. Deep Tendon Reflexes:      Reflex Scores:       Patellar reflexes are 2+ on the right side and 2+ on the left side. Psychiatric:         Speech: Speech normal.         Behavior: Behavior normal.         Judgment: Judgment normal.       Vitals:    10/06/20 0757   BP: 126/84   Site: Left Upper Arm   Position: Sitting   Cuff Size: Large Adult   Pulse: 82   Resp: 20   Temp: 97.2 °F (36.2 °C)   TempSrc: Infrared   SpO2: 98%   Weight: 233 lb (105.7 kg)   Height: 6' (1.829 m)     BP Readings from Last 3 Encounters:   10/06/20 126/84   09/14/20 (!) 153/84   09/11/20 (!) 144/97     Pulse Readings from Last 3 Encounters:   10/06/20 82   09/14/20 73   09/11/20 83     Wt Readings from Last 3 Encounters:   10/06/20 233 lb (105.7 kg)   09/14/20 230 lb (104.3 kg)   09/11/20 234 lb (106.1 kg)     Body mass index is 31.6 kg/m². Assessment:      1. Essential hypertension    2. Ulcer of esophagus without bleeding    3. Moderate asthma with acute exacerbation, unspecified whether persistent    4.  Anxiety          Plan:    27  Javan Ramires was seen today for hypertension and anxiety. Diagnoses and all orders for this visit:    Essential hypertension  -     metoprolol tartrate (LOPRESSOR) 25 MG tablet; TAKE 1 TABLET BY MOUTH TWICE DAILY  -     Good control.  -     Continue meds and lifestyle control. Blood pressure goal for people 75 years and below is 100-119/79 or less. If blood pressure is more than 139/89 for either number then an increase in medicine is indicated. If you are also diabetic then a blood pressure more 129/79 also means blood pressure medicines should be increased. The doctor should be called if the upper number (systolic blood pressure) is more than 180 once or more than 160 1/3 of the time. Call if the upper number is less than 90 or if less than 100 and you are light headed when you stand up. Call if the lower number (diastolic blood pressure) is more than 100. A much lower bottom number even in the 40's is not usually urgent. Monitor blood pressure at home about every 2 weeks or if you feel:  lightheaded/dizzy, have a headache, chest pain, shortness of breath, rapid heartbeat or heart palpitations. Record all these blood pressures on paper or in your cell phone with a date and time and any symptoms associated with the blood pressure reading. We have papers we can give you to record these on with columns for the date and time of day. Bring your record to every visit. Bring your blood pressure cuff at least every year so we can compare your cuff readings against our cuff readings. Bring your cuff more often if there are differences between the readings of our cuff and your cuff or if you are not sure if your cuff is working properly. If your cuff is consistently off 10 points higher or lower than our cuff you can still use the cuff as long as you remember to do the math when telling us your blood pressures. We can also give you pointers on how to use your cuff and make sure your readings are the most accurate possible.     Ulcer of esophagus without bleeding  -     pantoprazole (PROTONIX) 40 MG tablet; Take 1 tablet by mouth 2 times daily  Avoid late night eating. Moderate asthma with acute exacerbation, unspecified whether persistent  -     albuterol sulfate HFA (PROVENTIL HFA) 108 (90 Base) MCG/ACT inhaler; Inhale 2 puffs into the lungs every 4 hours as needed for Wheezing or Shortness of Breath Please provide pt c spacer as well  -     Good control.  -     Continue meds and lifestyle control. Anxiety  -     LORazepam (ATIVAN) 0.5 MG tablet; Take 1 tablet by mouth daily for 90 days. Get sleep study. With enlarged tonsils/adenoids, snoring, waking up not refreshed, anxiety, obesity he is high risk.     Labs for return visit on 1/6/2021  Orders Placed This Encounter   Procedures    Lipid Panel    Comprehensive Metabolic Panel    CBC Auto Differential    Vitamin D 25 Hydroxy    Magnesium    Microalbumin / Creatinine Urine Ratio    TSH with Reflex       Carlotta Nicholson,

## 2020-10-06 NOTE — PATIENT INSTRUCTIONS
Randall Tran was seen today for hypertension and anxiety. Diagnoses and all orders for this visit:    Essential hypertension  -     metoprolol tartrate (LOPRESSOR) 25 MG tablet; TAKE 1 TABLET BY MOUTH TWICE DAILY  -     Good control.  -     Continue meds and lifestyle control. Blood pressure goal for people 75 years and below is 100-119/79 or less. If blood pressure is more than 139/89 for either number then an increase in medicine is indicated. If you are also diabetic then a blood pressure more 129/79 also means blood pressure medicines should be increased. The doctor should be called if the upper number (systolic blood pressure) is more than 180 once or more than 160 1/3 of the time. Call if the upper number is less than 90 or if less than 100 and you are light headed when you stand up. Call if the lower number (diastolic blood pressure) is more than 100. A much lower bottom number even in the 40's is not usually urgent. Monitor blood pressure at home about every 2 weeks or if you feel:  lightheaded/dizzy, have a headache, chest pain, shortness of breath, rapid heartbeat or heart palpitations. Record all these blood pressures on paper or in your cell phone with a date and time and any symptoms associated with the blood pressure reading. We have papers we can give you to record these on with columns for the date and time of day. Bring your record to every visit. Bring your blood pressure cuff at least every year so we can compare your cuff readings against our cuff readings. Bring your cuff more often if there are differences between the readings of our cuff and your cuff or if you are not sure if your cuff is working properly. If your cuff is consistently off 10 points higher or lower than our cuff you can still use the cuff as long as you remember to do the math when telling us your blood pressures.   We can also give you pointers on how to use your cuff and make sure your readings are the most accurate possible. Ulcer of esophagus without bleeding  -     pantoprazole (PROTONIX) 40 MG tablet; Take 1 tablet by mouth 2 times daily  Avoid late night eating. Moderate asthma with acute exacerbation, unspecified whether persistent  -     albuterol sulfate HFA (PROVENTIL HFA) 108 (90 Base) MCG/ACT inhaler; Inhale 2 puffs into the lungs every 4 hours as needed for Wheezing or Shortness of Breath Please provide pt c spacer as well  -     Good control.  -     Continue meds and lifestyle control. Anxiety  -     LORazepam (ATIVAN) 0.5 MG tablet; Take 1 tablet by mouth daily for 90 days. Get sleep study.

## 2020-10-20 ENCOUNTER — TELEPHONE (OUTPATIENT)
Dept: FAMILY MEDICINE CLINIC | Age: 50
End: 2020-10-20

## 2020-11-19 ENCOUNTER — TELEPHONE (OUTPATIENT)
Dept: FAMILY MEDICINE CLINIC | Age: 50
End: 2020-11-19

## 2020-11-19 RX ORDER — AZITHROMYCIN 250 MG/1
250 TABLET, FILM COATED ORAL SEE ADMIN INSTRUCTIONS
Qty: 6 TABLET | Refills: 0 | Status: SHIPPED | OUTPATIENT
Start: 2020-11-19 | End: 2020-11-24

## 2020-11-19 NOTE — TELEPHONE ENCOUNTER
PT C/O NASAL DRAINAGE: SOME  WHAT COLOR: ?    COUGH: NO    BRINGING UP PHLEGM:  NO  IF YES, WHAT COLOR:  NO     SORE THROAT: NO    POST NASAL DRIP:  LITTLE BIT    HEADACHE:  YES    EAR PAIN: NO,   LEFT, RIGHT OR BOTH    S.O.B.:  NO  WITH OR WITHOUT EXERTION:      WHEEZING:  NO     HEAD CONGESTION:  YES    CHEST CONGESTION:  NO    BODY ACHES:  NO    VOMITINGNO    DIARRHEA: NO    TEMP: NO  IF SO, HIGHEST TEMP: NO  SYMPTOMS FOR HOW MANY DAYS:    4 DAYS    WHAT MEDS HAS PT TRIED: MUCOUS RELIEF - TYLENOL   MED ALLERGIES:  NO  VERIFY PHARMACY INFORMATION.           [address] [facility], [facility] [phone] - F [phone]    APPT OFFERED / REFUSED:  CAN'T COME IN  IF REFUSED, WHY: NO MY CHART    PT @  [phone]

## 2020-11-19 NOTE — TELEPHONE ENCOUNTER
Rx sent for Nahum Caputo. If symptoms worsen, consider COVID-19 testing but at this time sounds like a sinus infection.

## 2020-11-24 ENCOUNTER — TELEPHONE (OUTPATIENT)
Dept: FAMILY MEDICINE CLINIC | Age: 50
End: 2020-11-24

## 2020-11-24 NOTE — TELEPHONE ENCOUNTER
Patient is calling because he is still sick, he has congestion in head, no energy and has been this way now for 10 days. He has finished all his medication as of yesterday and he is not feeling any better. Was told to call us back if not better. Please give him a call back. [phone]  [facility].  Phone no. [phone]

## 2020-11-25 NOTE — TELEPHONE ENCOUNTER
For his sinus infection he took a medication and not bothering him. No energy needed for 20 hours a day. He feels horrible. He looked online Covid symptomatology. He had some body aches at the onset. He does not want get COVID-19 tested because he is not sure what he would do with that information. Explained that he needs to stay isolated. Ask who he lives with and he lives alone with his mother. Explained that he should be separate from her as if he had COVID-19. Reviewed information specifics below for his particular symptoms and for getting better in general.  Encouraged she up his vitamin C and take zinc.  He is unable to find zinc lozenges. Patient reports he had diarrhea. Explained that zinc pills will help with the diarrhea and also help with COVID-19 if he has it. IF you develop ANY respiratory infection symptoms (not just allergy symptoms) suggestive of COVID-19 start the recommendations below: Instructions for Respiratory Infections (SAVE THIS SHEET)    For the first 7-14 days of symptoms follow instructions below, even before being seen in the office or even during treatment with antibiotics, until symptom free. 1. Water: Drink 1 ounce of water for every 2 pounds of body weight for adults, kids need    Ounces of water/fluids per day. This will loosen mucus in the head and chest & improve the weak feeling of dehydration, allow the body to get germ fighting resources to the infection. Half of fluids can be juice or sugar free Crystal Light. Don't count drinks with caffeine, alcohol or carbonation. Infants can have Pedialyte liquid or freezer pops. Avoid salt and sports drinks if you have high Blood Pressure, swelling in the feet or ankles or have heart problems. 2. Humidity: Humidify the air to 35-50% ( or until the windows fog over slightly).  Can use a humidifier, vaporizer, boil water on the stove or put a coffee can full of water on the heater vents. This will loosen mucus from infections and allergies. 3. Sleep: Get 8-10 hours a night and rest during the evening after work or school. If you have trouble sleeping, adults can take Melatonin 5mg up to 2 tabs at bedtime ( not for children or pregnant women). If Mono is suspected then sleep during 9PM to 9AM time span (if possible.)  4. Cough: Take cough medicines with Guaifenesin ( to loosen chest or head congestion) and Dextromethorphan ( to decrease excess cough). Robitussin D.M. Syrup every 4-6 hrs OR Mucinex D. M. pills OR Delsym DM syrup twice a day. Use the pediatric formulations for children over 6 months making sure they are alcohol & sugar free for children, pregnant women, and diabetics. 5. Pain And Fevers: Take Acetaminophen ( Tylenol) for fevers, aches, and headaches. 2-500 mg every 8 hours for adults. Appropriate doses at bedtime for children may help them sleep better. If pregnant take 1 -500 mg (Tylenol) every 8 hours as needed. Ibuprofen/Aleve/aspirin for pain and fevers SHOULD NOT BE USED IN THE SETTING OF POSSIBLE COVID-19 viral infection NOR if pregnant, if you have acid reflux, high blood pressure, CHF, or kidney problems. 6.Gargle: (DAY ONE OF SYMPTOMS) Gargle in the back of the throat with the head tilted back and to the sides with a strong mouthwash  ( Listerine or Scope) after meals and at bedtime at least 4 -5 times a day. This helps kill bacteria and viruses in the back of the throat and will shorten the duration and decrease the severity of your symptoms: sore throat, cough, ear popping,/ear pain, and possibly dizziness. 7. Smoking: Avoid smoking or exposure to second hand smoke. 8. Zinc: (DAY ONE OF SYMPTOMS)  Zinc lozenges such as Cold Oskar (available most stores), or Basic ([employer] brand) will help shorten the duration and lessen symptoms such as sore throat, cough, nasal congestion, runny nose, and post nasal drip.  Use 1 lozenge every 2-4 hours ( after meals if stomach is sensitive). Children can use 10-15 mg or less 3-4 times a day or Zinc lollypops. In pregnancy limit to 50-60 mg a day for 7 days as prenatals have Zinc also. With diarrhea use zinc pills 50 mg 1/2 to 1 pill 2x/day. 9. Vitamins: Vitamin C 500 mg with breakfast and dinner (with suspected or diagnosed COVID-19 infection take vitamin C 1000 mg 2-3 times a day). Children and pregnant women should drink citrus juices. This speeds healing and strengthens immune system. 10. Chest Symptoms: Vicks Vapor rub to the chest at bedtime. 11. Decongestants: Avoid all decongestants and antihistamine cold preparations in children. Decongestants should always be avoided in people with high blood pressure, palpitations, heart disease and those on stimulant medications. Antihistamines may impede the body's ability to fight COVID-19.  12. Frequent hand washing and/or hand gel especially after coughing or sneezing into the hands or blowing the nose to help prevent spreading to others. Use kleenex and NOT handkerchiefs. Try all of the above starting with day 1 of symptoms. If Strep throat symptoms appear call to be seen in the office as soon as possible and don't gargle on that day. Newborns, infants, or anyone with earaches or influenza may need to be seen quickly. Adults with fevers over 103 degrees or shortness of breath should call the office immediately. Vitamin D 5000 IU daily. He had diarrhea yesterday and Sunday.

## 2020-12-07 RX ORDER — LISINOPRIL 20 MG/1
TABLET ORAL
Qty: 45 TABLET | Refills: 0 | Status: SHIPPED | OUTPATIENT
Start: 2020-12-07 | End: 2021-01-04 | Stop reason: SDUPTHER

## 2021-01-04 ENCOUNTER — OFFICE VISIT (OUTPATIENT)
Dept: FAMILY MEDICINE CLINIC | Age: 51
End: 2021-01-04
Payer: MEDICARE

## 2021-01-04 VITALS
TEMPERATURE: 98.8 F | DIASTOLIC BLOOD PRESSURE: 74 MMHG | SYSTOLIC BLOOD PRESSURE: 128 MMHG | HEIGHT: 75 IN | WEIGHT: 241 LBS | BODY MASS INDEX: 29.97 KG/M2

## 2021-01-04 DIAGNOSIS — J45.901 MODERATE ASTHMA WITH ACUTE EXACERBATION, UNSPECIFIED WHETHER PERSISTENT: ICD-10-CM

## 2021-01-04 DIAGNOSIS — Z13.1 DIABETES MELLITUS SCREENING: ICD-10-CM

## 2021-01-04 DIAGNOSIS — F41.9 ANXIETY: Chronic | ICD-10-CM

## 2021-01-04 DIAGNOSIS — I10 ESSENTIAL HYPERTENSION: Primary | ICD-10-CM

## 2021-01-04 DIAGNOSIS — Z13.220 LIPID SCREENING: ICD-10-CM

## 2021-01-04 DIAGNOSIS — K22.10 ULCER OF ESOPHAGUS WITHOUT BLEEDING: ICD-10-CM

## 2021-01-04 LAB
A/G RATIO: 1.9 (ref 1.1–2.2)
ALBUMIN SERPL-MCNC: 5 G/DL (ref 3.4–5)
ALCOHOL URINE: NORMAL
ALP BLD-CCNC: 86 U/L (ref 40–129)
ALT SERPL-CCNC: 29 U/L (ref 10–40)
AMPHETAMINE SCREEN, URINE: NORMAL
ANION GAP SERPL CALCULATED.3IONS-SCNC: 14 MMOL/L (ref 3–16)
AST SERPL-CCNC: 19 U/L (ref 15–37)
BARBITURATE SCREEN, URINE: NORMAL
BENZODIAZEPINE SCREEN, URINE: NORMAL
BILIRUB SERPL-MCNC: 0.4 MG/DL (ref 0–1)
BUN BLDV-MCNC: 16 MG/DL (ref 7–20)
BUPRENORPHINE URINE: NORMAL
CALCIUM SERPL-MCNC: 10 MG/DL (ref 8.3–10.6)
CHLORIDE BLD-SCNC: 103 MMOL/L (ref 99–110)
CHOLESTEROL, FASTING: 201 MG/DL (ref 0–199)
CO2: 25 MMOL/L (ref 21–32)
COCAINE METABOLITE SCREEN URINE: NORMAL
CREAT SERPL-MCNC: 0.8 MG/DL (ref 0.9–1.3)
FENTANYL SCREEN, URINE: NORMAL
GABAPENTIN SCREEN, URINE: NORMAL
GFR AFRICAN AMERICAN: >60
GFR NON-AFRICAN AMERICAN: >60
GLOBULIN: 2.7 G/DL
GLUCOSE BLD-MCNC: 97 MG/DL (ref 70–99)
HDLC SERPL-MCNC: 38 MG/DL (ref 40–60)
LDL CHOLESTEROL CALCULATED: 134 MG/DL
MDMA URINE: NORMAL
METHADONE SCREEN, URINE: NORMAL
METHAMPHETAMINE, URINE: NORMAL
OPIATE SCREEN URINE: NORMAL
OXYCODONE SCREEN URINE: NORMAL
PHENCYCLIDINE SCREEN URINE: NORMAL
POTASSIUM SERPL-SCNC: 4.8 MMOL/L (ref 3.5–5.1)
PROPOXYPHENE SCREEN, URINE: NORMAL
SODIUM BLD-SCNC: 142 MMOL/L (ref 136–145)
SYNTHETIC CANNABINOIDS(K2) SCREEN, URINE: NORMAL
THC SCREEN, URINE: NORMAL
TOTAL PROTEIN: 7.7 G/DL (ref 6.4–8.2)
TRAMADOL SCREEN URINE: NORMAL
TRICYCLIC ANTIDEPRESSANTS, UR: NORMAL
TRIGLYCERIDE, FASTING: 144 MG/DL (ref 0–150)
VLDLC SERPL CALC-MCNC: 29 MG/DL

## 2021-01-04 PROCEDURE — G8427 DOCREV CUR MEDS BY ELIG CLIN: HCPCS | Performed by: NURSE PRACTITIONER

## 2021-01-04 PROCEDURE — 99214 OFFICE O/P EST MOD 30 MIN: CPT | Performed by: NURSE PRACTITIONER

## 2021-01-04 PROCEDURE — 36415 COLL VENOUS BLD VENIPUNCTURE: CPT | Performed by: NURSE PRACTITIONER

## 2021-01-04 PROCEDURE — G8484 FLU IMMUNIZE NO ADMIN: HCPCS | Performed by: NURSE PRACTITIONER

## 2021-01-04 PROCEDURE — G8417 CALC BMI ABV UP PARAM F/U: HCPCS | Performed by: NURSE PRACTITIONER

## 2021-01-04 PROCEDURE — 1036F TOBACCO NON-USER: CPT | Performed by: NURSE PRACTITIONER

## 2021-01-04 PROCEDURE — 80305 DRUG TEST PRSMV DIR OPT OBS: CPT | Performed by: NURSE PRACTITIONER

## 2021-01-04 RX ORDER — LORAZEPAM 0.5 MG/1
0.5 TABLET ORAL DAILY
Qty: 30 TABLET | Refills: 2 | Status: SHIPPED | OUTPATIENT
Start: 2021-01-21 | End: 2021-01-28 | Stop reason: RX

## 2021-01-04 RX ORDER — LISINOPRIL 20 MG/1
TABLET ORAL
Qty: 45 TABLET | Refills: 1 | Status: SHIPPED | OUTPATIENT
Start: 2021-01-04 | End: 2021-08-24 | Stop reason: SDUPTHER

## 2021-01-04 RX ORDER — PANTOPRAZOLE SODIUM 40 MG/1
40 TABLET, DELAYED RELEASE ORAL 2 TIMES DAILY
Qty: 60 TABLET | Refills: 2 | Status: SHIPPED | OUTPATIENT
Start: 2021-01-04 | End: 2021-01-05 | Stop reason: SDUPTHER

## 2021-01-04 SDOH — ECONOMIC STABILITY: INCOME INSECURITY: HOW HARD IS IT FOR YOU TO PAY FOR THE VERY BASICS LIKE FOOD, HOUSING, MEDICAL CARE, AND HEATING?: NOT HARD AT ALL

## 2021-01-04 SDOH — ECONOMIC STABILITY: FOOD INSECURITY: WITHIN THE PAST 12 MONTHS, YOU WORRIED THAT YOUR FOOD WOULD RUN OUT BEFORE YOU GOT MONEY TO BUY MORE.: NEVER TRUE

## 2021-01-04 ASSESSMENT — ENCOUNTER SYMPTOMS
CHOKING: 0
STRIDOR: 0
APNEA: 0
COUGH: 0
CHEST TIGHTNESS: 0
SHORTNESS OF BREATH: 1
WHEEZING: 0

## 2021-01-04 ASSESSMENT — PATIENT HEALTH QUESTIONNAIRE - PHQ9: 2. FEELING DOWN, DEPRESSED OR HOPELESS: 0

## 2021-01-04 NOTE — LETTER
CONTROLLED SUBSTANCE MEDICATION AGREEMENT     Patient Name: Maykel Araujo  Patient YOB: 1970   I understand, that controlled substance medications may be used to help better manage my symptoms and to improve my ability to function at home, work and in social settings. However, I also understand that these medications do have risks, which have been discussed with me, including possible development of physical or psychological dependence. I understand that successful treatment requires mutual trust and honesty between me and my provider. I understand and agree that following this Medication Agreement is necessary in continuing my provider-patient relationship and the success of my treatment plan. Explanation from my Provider: Benefits and Goals of Controlled Substance Medications: There are two potential goals for your treatment: (1) decreased pain and suffering (2) improved daily life functions. There are many possible treatments for your chronic condition(s). Alternatives such as physical therapy, yoga, massage, home daily exercise, meditation, relaxation techniques, injections, chiropractic manipulations, surgery, cognitive therapy, hypnosis and many medications that are not habit-forming may be used. Use of controlled substance medications may be helpful, but they are unlikely to resolve all symptoms or restore all function.    Explanation from my Provider: Risks of Controlled Substance Medications: Opioid pain medications: These medications can lead to problems such as addiction/dependence, sedation, lightheadedness/dizziness, memory issues, falls, constipation, nausea, or vomiting. They may also impair the ability to drive or operate machinery. Additionally, these medications may lower testosterone levels, leading to loss of bone strength, stamina and sex drive. They may cause problems with breathing, sleep apnea and reduced coughing, which is especially dangerous for patients with lung disease. Overdose or dangerous interactions with alcohol and other medications may occur, leading to death. Hyperalgesia may develop, which means patients receiving opioids for the treatment of pain may become more sensitive to certain painful stimuli, and in some cases, experience pain from ordinarily non-painful stimuli. Women between the ages of 14-53 who could become pregnant should carefully weigh the risks and benefits of opioids with their physicians, as these medications increase the risk of pregnancy complications, including miscarriage,  delivery and stillbirth. It is also possible for babies to be born addicted to opioids. Opioid dependence withdrawal symptoms may include; feelings of uneasiness, increased pain, irritability, belly pain, diarrhea, sweats and goose-flesh. Benzodiazepines and non-benzodiazepine sleep medications: These medications can lead to problems such as addiction/dependence, sedation, fatigue, lightheadedness, dizziness, incoordination, falls, depression, hallucinations, and impaired judgment, memory and concentration. The ability to drive and operate machinery may also be affected. Abnormal sleep-related behaviors have been reported, including sleepwalking, driving, making telephone calls, eating, or having sex while not fully awake. These medications can suppress breathing and worsen sleep apnea, particularly when combined with alcohol or other sedating medications, potentially leading to death. Dependence withdrawal symptoms may include tremors, anxiety, hallucinations and seizures. Stimulants:  Common adverse effects include addiction/dependence, increased blood  pressure and heart rate, decreased appetite, nausea, involuntary weight loss, insomnia,                                                                                                                     Initials:_______   irritability, and headaches. These risks may increase when these medications are combined with other stimulants, such as caffeine pills or energy drinks, certain weight loss supplements and oral decongestants. Dependence withdrawal symptoms may include depressed mood, loss of interest, suicidal thoughts, anxiety, fatigue, appetite changes and agitation. Testosterone replacement therapy:  Potential side effects include increased risk of stroke and heart attack, blood clots, increased blood pressure, increased cholesterol, enlarged prostate, sleep apnea, irritability/aggression and other mood disorders, and decreased fertility. I agree and understand that I and my prescriber have the following rights and responsibilities regarding my treatment plan:     1. MY RIGHTS:  To be informed of my treatment and medication plan. To be an active participant in my health and wellbeing. 2. MY RESPONSIBILITY AND UNDERSTANDING FOR USE OF MEDICATIONS  ? I will take medications at the dose and frequency as directed. For my safety, I will not increase or change how I take my medications without the recommendation of my healthcare provider. ? I will actively participate in any program recommended by my provider which may improve function, including social, physical, psychological programs. ? I will not take my medications with alcohol or other drugs not prescribed to me. I understand that drinking alcohol with my medications increases the chances of side effects, including reduced breathing rate and could lead to personal injury when operating machinery. ? I understand that if I have a history of substance use disorders, including alcohol or other illicit drugs, that I may be at increased risk of addiction to my medications. ? I agree to notify my provider immediately if I should become pregnant so that my treatment plan can be adjusted. ? I agree and understand that I shall only receive controlled substance medications from the prescriber that signed this agreement unless there is written agreement among other prescribers of controlled substances outlining the responsibility of the medications being prescribed. ? I understand that the if the controlled medication is not helping to achieve goals, the dosage may be tapered and no longer prescribed. 3. MY RESPONSIBILITY FOR COMMUNICATION / PRESCRIPTION RENEWALS  ? I agree that all controlled substance medications that I take will be prescribed only by my provider. If another healthcare provider prescribes me medication in an emergency, I will notify my provider within seventy-two (72) hours. ? I will arrange for refills at the prescribed interval ONLY during regular office hours. I will not ask for refills earlier than agreed, after-hours, on holidays or weekends. Refills may take up to 72 hours for processing and prescriptions to reach the pharmacy. ? I will inform my other health care providers that I am taking these medications and of the existence of this [id number]. In the event of an emergency, I will provide the same information to the emergency department prescribers. ? I will keep my provider updated on the pharmacy I am using for controlled medication prescription filling. Initials:_______  4. MY RESPONSIBILITY FOR PROTECTING MEDICATIONS  ? I will protect my prescriptions and medications. I understand that lost or misplaced prescriptions will not be replaced. ? I will keep medications only for my own use and will not share them with others. I will keep all medications away from children. ? I agree that if my medications are adjusted or discontinued, I will properly dispose of any remaining medications. I understand that I will be required to dispose of any remaining controlled medications as, directed by my prescriber, prior to being provided with any prescriptions for other controlled medications. Medication drop box locations can be found at: [email or web address]    5. MY RESPONSIBILITY WITH ILLEGAL DRUGS   ? I will not use illegal or street drugs or another person's prescription medications not prescribed to me.   ? If there are identified addiction type symptoms, then referral to a program may be provided by my provider and I agree to follow through with this recommendation.   6. MY RESPONSIBILITY FOR COOPERATION WITH INVESTIGATIONS ? I understand that my provider will comply with any applicable law and may discuss my use and/or possible misuse/abuse of controlled substances and alcohol, as appropriate, with any health care provider involved in my care, pharmacist, or legal authority. ? I authorize my provider and pharmacy to cooperate fully with law enforcement agencies (as permitted by law) in the investigation of any possible misuse, sale, or other diversion of my controlled substances. ? I agree to waive any applicable privilege or right of privacy or confidentiality with respect to these authorizations. 7. PROVIDERS RIGHT TO MONITOR FOR SAFETY: PRESCRIPTION MONITORING / DRUG TESTING  ? I consent to drug/toxicology screening and will submit to a drug screen upon my providers request to assure I am only taking the prescribed drugs for my safety monitoring. I understand that a drug screen is a laboratory test in which a sample of my urine, blood or saliva is checked to see what drugs I have been taking. This may entail an observed urine specimen, which means that a nurse or other health care provider may watch me provide urine, and I will cooperate if I am asked to provide an observed specimen. ? I understand that my provider will check a copy of my State Prescription Monitoring Program () Report in order to safely prescribe medications. ? Pill Counts: I consent to pill counts when requested. I may be asked to bring all my prescribed controlled substance medications, in their original bottles, to all of my scheduled appointments. In addition, my provider may ask me to come to the practice at any time for a random pill count. 8. TERMINATION OF THIS AGREEMENT  For my safety, my prescriber has the right to stop prescribing controlled substance medications and may end this agreement. Initials:_______  ?  Conditions that may result in termination of this agreement: a. I do not show any improvement in pain, or my activity has not improved. b. I develop rapid tolerance or loss of improvement, as described in my treatment plan.  c. I develop significant side effects from the medication. d. My behavior is not consistent with the responsibilities outlined above, thereby causing safety concerns to continue prescribing controlled substance medications. e. I fail to follow the terms of this agreement. f. Other:____________________________       UNDERSTANDING THIS MEDICATION AGREEMENT:    I have read the above and have had all my questions answered. For chronic disease management, I know that my symptoms can be managed with many types of treatments. A chronic medication trial may be part of my treatment, but I must be an active participant in my care. Medication therapy is only one part of my symptom management plan. In some cases, there may be limited scientific evidence to support the chronic use of certain medications to improve symptoms and daily function. Furthermore, in certain circumstances, there may be scientific information that suggests that the use of chronic controlled substances may worsen my symptoms and increase my risk of unintentional death directly related to this medication therapy. I know that if my provider feels my risk from controlled medications is greater than my benefit, I will have my controlled substance medication(s) compassionately lowered or removed altogether. I further agree to allow this office to contact my HIPAA contact if there are concerns about my safety and use of the controlled medications. I have agreed to use the prescribed controlled substance medications to me as instructed by my provider and as stated in this Medication Agreement. My initial on each page and my signature below shows that I have read each page and I have had the opportunity to ask questions with answers provided by my provider. Patient Name (Printed): _____________________________________  Patient Signature:  ______________________   Date: _____________    Prescriber Name (Printed): ___________________________________  Prescriber Signature: _____________________  Date: _____________

## 2021-01-04 NOTE — PATIENT INSTRUCTIONS
Patient Education        High Blood Pressure: Care Instructions  Overview     It's normal for blood pressure to go up and down throughout the day. But if it stays up, you have high blood pressure. Another name for high blood pressure is hypertension. Despite what a lot of people think, high blood pressure usually doesn't cause headaches or make you feel dizzy or lightheaded. It usually has no symptoms. But it does increase your risk of stroke, heart attack, and other problems. You and your doctor will talk about your risks of these problems based on your blood pressure. Your doctor will give you a goal for your blood pressure. Your goal will be based on your health and your age. Lifestyle changes, such as eating healthy and being active, are always important to help lower blood pressure. You might also take medicine to reach your blood pressure goal.  Follow-up care is a key part of your treatment and safety. Be sure to make and go to all appointments, and call your doctor if you are having problems. It's also a good idea to know your test results and keep a list of the medicines you take. How can you care for yourself at home? Medical treatment  · If you stop taking your medicine, your blood pressure will go back up. You may take one or more types of medicine to lower your blood pressure. Be safe with medicines. Take your medicine exactly as prescribed. Call your doctor if you think you are having a problem with your medicine. · Talk to your doctor before you start taking aspirin every day. Aspirin can help certain people lower their risk of a heart attack or stroke. But taking aspirin isn't right for everyone, because it can cause serious bleeding. · See your doctor regularly. You may need to see the doctor more often at first or until your blood pressure comes down.   · If you are taking blood pressure medicine, talk to your doctor before you take decongestants or anti-inflammatory medicine, such as ibuprofen. Some of these medicines can raise blood pressure. · Learn how to check your blood pressure at home. Lifestyle changes  · Stay at a healthy weight. This is especially important if you put on weight around the waist. Losing even 10 pounds can help you lower your blood pressure. · If your doctor recommends it, get more exercise. Walking is a good choice. Bit by bit, increase the amount you walk every day. Try for at least 30 minutes on most days of the week. You also may want to swim, bike, or do other activities. · Avoid or limit alcohol. Talk to your doctor about whether you can drink any alcohol. · Try to limit how much sodium you eat to less than 2,300 milligrams (mg) a day. Your doctor may ask you to try to eat less than 1,500 mg a day. · Eat plenty of fruits (such as bananas and oranges), vegetables, legumes, whole grains, and low-fat dairy products. · Lower the amount of saturated fat in your diet. Saturated fat is found in animal products such as milk, cheese, and meat. Limiting these foods may help you lose weight and also lower your risk for heart disease. · Do not smoke. Smoking increases your risk for heart attack and stroke. If you need help quitting, talk to your doctor about stop-smoking programs and medicines. These can increase your chances of quitting for good. When should you call for help? Call  911 anytime you think you may need emergency care. This may mean having symptoms that suggest that your blood pressure is causing a serious heart or blood vessel problem. Your blood pressure may be over 180/120. For example, call 911 if:    · You have symptoms of a heart attack. These may include:  ? Chest pain or pressure, or a strange feeling in the chest.  ? Sweating. ? Shortness of breath. ? Nausea or vomiting. ? Pain, pressure, or a strange feeling in the back, neck, jaw, or upper belly or in one or both shoulders or arms. ? Lightheadedness or sudden weakness.   ? A fast or Information box to learn more about \"Asthma Attack: Care Instructions. \"     If you do not have an account, please click on the \"Sign Up Now\" link. Current as of: February 24, 2020               Content Version: 12.6  © 2006-2020 [employer]. Care instructions adapted under license by [facility] If you have questions about a medical condition or this instruction, always ask your healthcare professional. [place] [address] any warranty or liability for your use of this information. Patient Education        Anxiety Disorder: Care Instructions  Your Care Instructions     Anxiety is a normal reaction to stress. Difficult situations can cause you to have symptoms such as sweaty palms and a nervous feeling. In an anxiety disorder, the symptoms are far more severe. Constant worry, muscle tension, trouble sleeping, nausea and diarrhea, and other symptoms can make normal daily activities difficult or impossible. These symptoms may occur for no reason, and they can affect your work, school, or social life. Medicines, counseling, and self-care can all help. Follow-up care is a key part of your treatment and safety. Be sure to make and go to all appointments, and call your doctor if you are having problems. It's also a good idea to know your test results and keep a list of the medicines you take. How can you care for yourself at home? · Take medicines exactly as directed. Call your doctor if you think you are having a problem with your medicine. · Go to your counseling sessions and follow-up appointments. · Recognize and accept your anxiety. Then, when you are in a situation that makes you anxious, say to yourself, \"This is not an emergency. I feel uncomfortable, but I am not in danger. I can keep going even if I feel anxious. \"  · Be kind to your body:  ? Relieve tension with exercise or a massage. ? Get enough rest.  ? Avoid alcohol, caffeine, nicotine, and illegal drugs.  They relax more and more deeply. · Become aware of the state of calmness that surrounds you. · When your relaxation time is over, you can bring yourself back to alertness by moving your fingers and toes and then your hands and feet and then stretching and moving your entire body. Sometimes people fall asleep during relaxation, but they usually wake up shortly afterward. · Always give yourself time to return to full alertness before you drive a car or do anything that might cause an accident if you are not fully alert. Never play a relaxation tape while you drive a car. When should you call for help? Call 911 anytime you think you may need emergency care. For example, call if:    · You feel you cannot stop from hurting yourself or someone else. Keep the numbers for these national suicide hotlines: 3-291-105-TALK (1-406.192.8671) and 2-506-CADCUWE (2-185.354.4426). If you or someone you know talks about suicide or feeling hopeless, get help right away. Watch closely for changes in your health, and be sure to contact your doctor if:    · You have anxiety or fear that affects your life.     · You have symptoms of anxiety that are new or different from those you had before. Where can you learn more? Go to [email or web address]. org and sign in to your [employer] account. Enter P754 in the [facility] box to learn more about \"Anxiety Disorder: Care Instructions. \"     If you do not have an account, please click on the \"Sign Up Now\" link. Current as of: January 31, 2020               Content Version: 12.6  © 7800-3813 [employer]. Care instructions adapted under license by [facility] If you have questions about a medical condition or this instruction, always ask your healthcare professional. [address] any warranty or liability for your use of this information.

## 2021-01-05 DIAGNOSIS — K22.10 ULCER OF ESOPHAGUS WITHOUT BLEEDING: ICD-10-CM

## 2021-01-05 RX ORDER — PANTOPRAZOLE SODIUM 40 MG/1
40 TABLET, DELAYED RELEASE ORAL 2 TIMES DAILY
Qty: 30 TABLET | Refills: 6 | Status: SHIPPED | OUTPATIENT
Start: 2021-01-05 | End: 2021-05-26

## 2021-01-05 NOTE — TELEPHONE ENCOUNTER
CALLED AND SPOKE WITH PT IN REGARDS TO LAB RESULTS.  HE NEEDS THE PROTONIX SCRIPT SENT WITH THE QTY OF 30 AND 6 REFILLS THIS IS HOW INSURANCE WILL COVER  IT. HS

## 2021-01-28 ENCOUNTER — TELEPHONE (OUTPATIENT)
Dept: FAMILY MEDICINE CLINIC | Age: 51
End: 2021-01-28

## 2021-01-28 DIAGNOSIS — F41.9 ANXIETY: Primary | Chronic | ICD-10-CM

## 2021-01-28 RX ORDER — CLONAZEPAM 0.5 MG/1
0.5 TABLET ORAL DAILY
Qty: 30 TABLET | Refills: 2 | Status: SHIPPED | OUTPATIENT
Start: 2021-01-28 | End: 2021-05-04 | Stop reason: SDUPTHER

## 2021-01-28 NOTE — TELEPHONE ENCOUNTER
Anxiety  -     clonazePAM (KLONOPIN) 0.5 MG tablet; Take 1 tablet by mouth daily for 90 days. Notified patient. We'll change back to lorazepam when available.

## 2021-01-28 NOTE — TELEPHONE ENCOUNTER
LAST SEEN ON 1/4 FOLLOW UP 4/6 YUKI BULLARD LAST APPT BUT PT NORMALLY FOLLOWS WITH DR. Asia Coronado. Kylee Kohli

## 2021-05-04 ENCOUNTER — OFFICE VISIT (OUTPATIENT)
Dept: FAMILY MEDICINE CLINIC | Age: 51
End: 2021-05-04
Payer: MEDICARE

## 2021-05-04 VITALS
OXYGEN SATURATION: 98 % | BODY MASS INDEX: 28.85 KG/M2 | DIASTOLIC BLOOD PRESSURE: 86 MMHG | SYSTOLIC BLOOD PRESSURE: 130 MMHG | HEIGHT: 75 IN | RESPIRATION RATE: 20 BRPM | WEIGHT: 232 LBS | HEART RATE: 102 BPM

## 2021-05-04 DIAGNOSIS — M51.9 LUMBAR DISC DISEASE: Primary | ICD-10-CM

## 2021-05-04 DIAGNOSIS — J45.901 MODERATE ASTHMA WITH ACUTE EXACERBATION, UNSPECIFIED WHETHER PERSISTENT: ICD-10-CM

## 2021-05-04 DIAGNOSIS — F41.9 ANXIETY: ICD-10-CM

## 2021-05-04 DIAGNOSIS — I10 ESSENTIAL HYPERTENSION: ICD-10-CM

## 2021-05-04 DIAGNOSIS — E78.6 LOW HDL (UNDER 40): ICD-10-CM

## 2021-05-04 DIAGNOSIS — E78.00 HYPERCHOLESTEROLEMIA: ICD-10-CM

## 2021-05-04 DIAGNOSIS — Q04.8 CEREBELLAR TONSILLAR ECTOPIA (HCC): ICD-10-CM

## 2021-05-04 PROCEDURE — G8427 DOCREV CUR MEDS BY ELIG CLIN: HCPCS | Performed by: FAMILY MEDICINE

## 2021-05-04 PROCEDURE — G8417 CALC BMI ABV UP PARAM F/U: HCPCS | Performed by: FAMILY MEDICINE

## 2021-05-04 PROCEDURE — 99215 OFFICE O/P EST HI 40 MIN: CPT | Performed by: FAMILY MEDICINE

## 2021-05-04 PROCEDURE — 1036F TOBACCO NON-USER: CPT | Performed by: FAMILY MEDICINE

## 2021-05-04 RX ORDER — HYDROCODONE BITARTRATE AND ACETAMINOPHEN 5; 325 MG/1; MG/1
1 TABLET ORAL EVERY 4 HOURS PRN
Qty: 18 TABLET | Refills: 0 | Status: SHIPPED | OUTPATIENT
Start: 2021-05-04 | End: 2021-05-07

## 2021-05-04 RX ORDER — CLONAZEPAM 0.5 MG/1
0.5 TABLET ORAL DAILY
Qty: 30 TABLET | Refills: 1 | Status: SHIPPED | OUTPATIENT
Start: 2021-06-01 | End: 2021-08-24 | Stop reason: SDUPTHER

## 2021-05-04 RX ORDER — ALBUTEROL SULFATE 90 UG/1
2 AEROSOL, METERED RESPIRATORY (INHALATION) EVERY 4 HOURS PRN
Qty: 1 INHALER | Refills: 5 | Status: SHIPPED | OUTPATIENT
Start: 2021-05-04 | End: 2022-10-21

## 2021-05-04 RX ORDER — BUDESONIDE AND FORMOTEROL FUMARATE DIHYDRATE 160; 4.5 UG/1; UG/1
2 AEROSOL RESPIRATORY (INHALATION) 2 TIMES DAILY
Qty: 3 INHALER | Refills: 5 | Status: SHIPPED | OUTPATIENT
Start: 2021-05-04 | End: 2022-10-21

## 2021-05-04 RX ORDER — METHYLPREDNISOLONE 4 MG/1
TABLET ORAL
Qty: 1 KIT | Refills: 0 | Status: SHIPPED | OUTPATIENT
Start: 2021-05-04 | End: 2021-05-10
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
## 2021-05-04 NOTE — PATIENT INSTRUCTIONS
stand up. Call if the lower number (diastolic blood pressure) is more than 100. A much lower bottom number even in the 40's is not usually urgent. Monitor blood pressure at home about every 2 weeks or if you feel:  lightheaded/dizzy, have a headache, chest pain, shortness of breath, rapid heartbeat or heart palpitations. Record all these blood pressures on paper or in your cell phone with a date and time and any symptoms associated with the blood pressure reading. We have papers we can give you to record these on with columns for the date and time of day. Bring your record to every visit. Bring your blood pressure cuff at least every year so we can compare your cuff readings against our cuff readings. Bring your cuff more often if there are differences between the readings of our cuff and your cuff or if you are not sure if your cuff is working properly. If your cuff is consistently off 10 points higher or lower than our cuff you can still use the cuff as long as you remember to do the math when telling us your blood pressures. We can also give you pointers on how to use your cuff and make sure your readings are the most accurate possible. Hypercholesterolemia  The 10-year ASCVD risk score (José Antonio Garcia., et al., 2013) is: 5.1%    Values used to calculate the score:      Age: 53 years      Sex: Male      Is Non- : No      Diabetic: No      Tobacco smoker: No      Systolic Blood Pressure: 786 mmHg      Is BP treated: Yes      HDL Cholesterol: 38 mg/dL      Total Cholesterol: 201 mg/dL    Low HDL (under 40)  The good HDL cholesterol is not on goal.  You can increase the HDL through exercise most effectively.    The recommendation from the [facility] Service for exercise is 30 minutes brisk walking or the equivalent 5 times per week OR aerobic levels of exercise 25 minutes 3 times a week (breathing slightly hard and sweating at room temperature are indicators of aerobic exercise). OR walking 10,000 steps/day counted on a pedometer or cell phone or Fitbit type watch. However the newest recommendation encourages us to be active any way we can by making good choices such as taking the stairs instead of the elevator/escalator, parking at the end of the parking lots stores and walking the distance in and back out, looking for ways to be active with our families like going for a walk with our children or grandchildren, riding bikes with our family and friends, and instead of sitting on the couch and talking on the phone to friends or family drive to the mall and walk together while talking or talking together on the cell phone while we walk on treadmills or ride exercise bikes at home this can help encourage us to stick with a program to have accountability, or riding an exercise bike or walking on a treadmill or using an elliptical while watching our favorite television programs or movies for any period of time. For some people even 5 or 10 minutes 2 or 3 times a day would be extremely helpful. Omega 3 fatty acids such as are found in fish oil also raise the HDL. Each capsule of fish oil should have 800 mg or more of a combination of EPA & DHA - the active omega 3's- per capsule and you take between 1 capsule per day until next cholesterol. Omega 3's also lower triglycerides and help lubricate joints to lower arthritis pain. Anxiety  -     clonazePAM (KLONOPIN) 0.5 MG tablet; Take 1 tablet by mouth daily for 60 days. Use as little as possible.

## 2021-05-26 DIAGNOSIS — K22.10 ULCER OF ESOPHAGUS WITHOUT BLEEDING: ICD-10-CM

## 2021-05-26 RX ORDER — PANTOPRAZOLE SODIUM 40 MG/1
TABLET, DELAYED RELEASE ORAL
Qty: 30 TABLET | Refills: 2 | Status: SHIPPED | OUTPATIENT
Start: 2021-05-26 | End: 2021-07-13

## 2021-06-05 ENCOUNTER — APPOINTMENT (OUTPATIENT)
Dept: GENERAL RADIOLOGY | Age: 51
End: 2021-06-05
Payer: MEDICARE

## 2021-06-05 ENCOUNTER — HOSPITAL ENCOUNTER (EMERGENCY)
Age: 51
Discharge: HOME | End: 2021-06-05
Attending: EMERGENCY MEDICINE
Payer: MEDICARE

## 2021-06-05 VITALS
TEMPERATURE: 98.7 F | DIASTOLIC BLOOD PRESSURE: 85 MMHG | WEIGHT: 227.96 LBS | OXYGEN SATURATION: 99 % | RESPIRATION RATE: 17 BRPM | HEART RATE: 108 BPM | SYSTOLIC BLOOD PRESSURE: 125 MMHG | BODY MASS INDEX: 28.49 KG/M2

## 2021-06-05 DIAGNOSIS — F41.0 ANXIETY ATTACK: Primary | ICD-10-CM

## 2021-06-05 LAB
ANION GAP SERPL CALCULATED.3IONS-SCNC: 13 MMOL/L (ref 3–16)
BASOPHILS ABSOLUTE: 0 K/UL (ref 0–0.2)
BASOPHILS RELATIVE PERCENT: 0.3 %
BUN BLDV-MCNC: 19 MG/DL (ref 7–20)
CALCIUM SERPL-MCNC: 9.1 MG/DL (ref 8.3–10.6)
CHLORIDE BLD-SCNC: 99 MMOL/L (ref 99–110)
CO2: 20 MMOL/L (ref 21–32)
CREAT SERPL-MCNC: 1.1 MG/DL (ref 0.9–1.3)
EOSINOPHILS ABSOLUTE: 0.1 K/UL (ref 0–0.6)
EOSINOPHILS RELATIVE PERCENT: 0.5 %
GFR AFRICAN AMERICAN: >60
GFR NON-AFRICAN AMERICAN: >60
GLUCOSE BLD-MCNC: 102 MG/DL (ref 70–99)
HCT VFR BLD CALC: 42.5 % (ref 40.5–52.5)
HEMOGLOBIN: 14.5 G/DL (ref 13.5–17.5)
LYMPHOCYTES ABSOLUTE: 1.5 K/UL (ref 1–5.1)
LYMPHOCYTES RELATIVE PERCENT: 9 %
MCH RBC QN AUTO: 29.3 PG (ref 26–34)
MCHC RBC AUTO-ENTMCNC: 34.1 G/DL (ref 31–36)
MCV RBC AUTO: 85.9 FL (ref 80–100)
MONOCYTES ABSOLUTE: 0.8 K/UL (ref 0–1.3)
MONOCYTES RELATIVE PERCENT: 4.7 %
NEUTROPHILS ABSOLUTE: 14.2 K/UL (ref 1.7–7.7)
NEUTROPHILS RELATIVE PERCENT: 85.5 %
PDW BLD-RTO: 13.8 % (ref 12.4–15.4)
PLATELET # BLD: 309 K/UL (ref 135–450)
PMV BLD AUTO: 8.3 FL (ref 5–10.5)
POTASSIUM SERPL-SCNC: 4 MMOL/L (ref 3.5–5.1)
PRO-BNP: 6 PG/ML (ref 0–124)
RBC # BLD: 4.95 M/UL (ref 4.2–5.9)
REASON FOR REJECTION: NORMAL
REJECTED TEST: NORMAL
SODIUM BLD-SCNC: 132 MMOL/L (ref 136–145)
TROPONIN: <0.01 NG/ML
WBC # BLD: 16.6 K/UL (ref 4–11)

## 2021-06-05 PROCEDURE — 71045 X-RAY EXAM CHEST 1 VIEW: CPT

## 2021-06-05 PROCEDURE — 83880 ASSAY OF NATRIURETIC PEPTIDE: CPT

## 2021-06-05 PROCEDURE — 84484 ASSAY OF TROPONIN QUANT: CPT

## 2021-06-05 PROCEDURE — 93005 ELECTROCARDIOGRAM TRACING: CPT | Performed by: EMERGENCY MEDICINE

## 2021-06-05 PROCEDURE — 99285 EMERGENCY DEPT VISIT HI MDM: CPT

## 2021-06-05 PROCEDURE — 80048 BASIC METABOLIC PNL TOTAL CA: CPT

## 2021-06-05 PROCEDURE — 36415 COLL VENOUS BLD VENIPUNCTURE: CPT

## 2021-06-05 PROCEDURE — 85025 COMPLETE CBC W/AUTO DIFF WBC: CPT

## 2021-06-05 ASSESSMENT — PAIN SCALES - GENERAL
PAINLEVEL_OUTOF10: 0
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
## 2021-06-06 LAB
EKG ATRIAL RATE: 100 BPM
EKG DIAGNOSIS: NORMAL
EKG P AXIS: 26 DEGREES
EKG P-R INTERVAL: 202 MS
EKG Q-T INTERVAL: 344 MS
EKG QRS DURATION: 94 MS
EKG QTC CALCULATION (BAZETT): 441 MS
EKG R AXIS: 57 DEGREES
EKG T AXIS: 16 DEGREES
EKG VENTRICULAR RATE: 99 BPM

## 2021-06-06 PROCEDURE — 93010 ELECTROCARDIOGRAM REPORT: CPT | Performed by: INTERNAL MEDICINE

## 2021-06-07 ENCOUNTER — CARE COORDINATION (OUTPATIENT)
Dept: CARE COORDINATION | Age: 51
End: 2021-06-07

## 2021-07-13 DIAGNOSIS — K22.10 ULCER OF ESOPHAGUS WITHOUT BLEEDING: ICD-10-CM

## 2021-07-13 RX ORDER — PANTOPRAZOLE SODIUM 40 MG/1
TABLET, DELAYED RELEASE ORAL
Qty: 30 TABLET | Refills: 1 | Status: SHIPPED | OUTPATIENT
Start: 2021-07-13 | End: 2021-08-11

## 2021-07-13 NOTE — TELEPHONE ENCOUNTER
LAST OV WITH DR Gladis Westfall 5/4/2021    Future Appointments   Date Time Provider Sudhakar Wilson   8/24/2021  8:00 AM DO Dejon Contreras

## 2021-08-11 DIAGNOSIS — K22.10 ULCER OF ESOPHAGUS WITHOUT BLEEDING: ICD-10-CM

## 2021-08-11 RX ORDER — PANTOPRAZOLE SODIUM 40 MG/1
TABLET, DELAYED RELEASE ORAL
Qty: 30 TABLET | Refills: 0 | Status: SHIPPED | OUTPATIENT
Start: 2021-08-11 | End: 2021-08-24 | Stop reason: SDUPTHER

## 2021-08-11 NOTE — TELEPHONE ENCOUNTER
LAST OV WITH DR Juan Pablo Light 5/4/2021  Future Appointments   Date Time Provider Sudhakar Wilson   8/24/2021  8:00 AM DO Dejon Arroyo

## 2021-08-24 ENCOUNTER — OFFICE VISIT (OUTPATIENT)
Dept: FAMILY MEDICINE CLINIC | Age: 51
End: 2021-08-24
Payer: MEDICARE

## 2021-08-24 VITALS
WEIGHT: 218.2 LBS | BODY MASS INDEX: 29.55 KG/M2 | HEIGHT: 72 IN | RESPIRATION RATE: 20 BRPM | HEART RATE: 82 BPM | OXYGEN SATURATION: 98 % | SYSTOLIC BLOOD PRESSURE: 120 MMHG | DIASTOLIC BLOOD PRESSURE: 80 MMHG

## 2021-08-24 DIAGNOSIS — F41.9 ANXIETY: ICD-10-CM

## 2021-08-24 DIAGNOSIS — K22.10 ULCER OF ESOPHAGUS WITHOUT BLEEDING: ICD-10-CM

## 2021-08-24 DIAGNOSIS — I10 ESSENTIAL HYPERTENSION: Primary | ICD-10-CM

## 2021-08-24 DIAGNOSIS — E78.00 HYPERCHOLESTEROLEMIA: ICD-10-CM

## 2021-08-24 DIAGNOSIS — J45.901 MODERATE ASTHMA WITH ACUTE EXACERBATION, UNSPECIFIED WHETHER PERSISTENT: ICD-10-CM

## 2021-08-24 DIAGNOSIS — E78.6 LOW HDL (UNDER 40): ICD-10-CM

## 2021-08-24 PROCEDURE — G8427 DOCREV CUR MEDS BY ELIG CLIN: HCPCS | Performed by: FAMILY MEDICINE

## 2021-08-24 PROCEDURE — 99215 OFFICE O/P EST HI 40 MIN: CPT | Performed by: FAMILY MEDICINE

## 2021-08-24 PROCEDURE — G8417 CALC BMI ABV UP PARAM F/U: HCPCS | Performed by: FAMILY MEDICINE

## 2021-08-24 PROCEDURE — 1036F TOBACCO NON-USER: CPT | Performed by: FAMILY MEDICINE

## 2021-08-24 RX ORDER — CLONAZEPAM 0.5 MG/1
0.5 TABLET ORAL DAILY
Qty: 30 TABLET | Refills: 2 | Status: SHIPPED | OUTPATIENT
Start: 2021-09-03 | End: 2021-12-07 | Stop reason: SDUPTHER

## 2021-08-24 RX ORDER — LISINOPRIL 20 MG/1
TABLET ORAL
Qty: 45 TABLET | Refills: 1 | Status: SHIPPED | OUTPATIENT
Start: 2021-08-24 | End: 2022-02-28

## 2021-08-24 RX ORDER — GARLIC 180 MG
1 TABLET, DELAYED RELEASE (ENTERIC COATED) ORAL DAILY
COMMUNITY
End: 2022-05-11

## 2021-08-24 RX ORDER — PANTOPRAZOLE SODIUM 40 MG/1
TABLET, DELAYED RELEASE ORAL
Qty: 180 TABLET | Refills: 0 | Status: SHIPPED | OUTPATIENT
Start: 2021-08-24 | End: 2021-11-29

## 2021-08-24 NOTE — PATIENT INSTRUCTIONS
with you to take IF you have a milder allergic reaction after you leave the site. If you use an EpiPen take it with you. Using an EpiPen is not a contraindication to the vaccine only a precaution. Notify them that you use an EpiPen when they give you the shot. The second shot can be associated with flulike symptoms - but you CANNOT get COVID-19 from the vaccination. The flulike symptoms are your body's response to the RNA injected which has already produced antibodies after the first shot. It is better to have a few days of flulike symptoms than a few weeks on a ventilator. COVID-19 VACCINE REACTION TREATMENT OF FLULIKE SYMPTOMS  Often after the second shot with the [address] vaccines or after the first [employer] vaccine shot there is a strong antibody reaction causing flulike symptoms. Not everybody experiences this. It occurs very often in people who have already had COVID-19 who get the first shot. It sometimes also occurs in people who have never had symptoms of COVID-19 following the first shot with the [employer] and Moderna vaccines if they had a prior asymptomatic COVID-19 infection. Symptoms can include: Fatigue, lightheadedness due to low blood pressure, headache, muscle aches, enlargement of lymph nodes in the armpit on the same side as the vaccine was given, or for some people nausea vomiting and diarrhea. It is wise to have some sports drink like Gatorade at the house to keep the blood pressures up if your blood pressure drops and you feel lightheaded. This can also be used if you are getting dehydrated from nausea, vomiting or diarrhea. If you have nausea and vomiting call the office and we will call in medication to prevent dehydration which will worsen and prolong your flulike symptoms.   If you have diarrhea but no nausea or vomiting you can take a zinc pill 50 mg over-the-counter during or immediately after a meal for the first dose of zinc and then decrease to 1/2 pill

## 2021-08-24 NOTE — PROGRESS NOTES
Yenny Damon (:  1970) is a 53 y.o. male,Established patient, here for evaluation of the following chief complaint(s):  Hypertension (FOLLOW UP ON HTN) and Hyperlipidemia (FOLLOW UP ON CHOLESTEROL)       ASSESSMENT/PLAN:  859    Patient Instructions   Tasia Patricia was seen today for hypertension and hyperlipidemia. Diagnoses and all orders for this visit:    Essential hypertension  -     metoprolol tartrate (LOPRESSOR) 25 MG tablet; Take 1 tablet by mouth twice daily  -     lisinopril (PRINIVIL;ZESTRIL) 20 MG tablet; Take 1/2 (one-half) tablet by mouth once daily  -     Good control. If sweating may need some salt. -     Continue meds and lifestyle control. Hypercholesterolemia  You can lower your LDL cholesterol by decreasing your saturated fats, eating fewer egg yolks and using egg whites/substitutes, eating smaller portions of red meat and using more skinless poultry and fish to meet your protein needs. Greek yogurt and low-fat cottage cheese are good sources of protein which are low in cholesterol. Try to get some of your proteins from plant sources such as beans, nuts, peas and / or soy products such as tofu. Adding fruits and vegetables to your diet (a total of 5 servings or more between the two) can help also. They help to fill you up so you eat less of the cholesterol raising foods and the fiber lowers cholesterol. Low HDL (under 40)  The good HDL cholesterol is not on goal.  You can increase the HDL through exercise most effectively. The recommendation from the [facility] Service for exercise is 30 minutes brisk walking or the equivalent 5 times per week OR aerobic levels of exercise 25 minutes 3 times a week (breathing slightly hard and sweating at room temperature are indicators of aerobic exercise). OR walking 10,000 steps/day counted on a pedometer or cell phone or Fitbit type watch.  However the newest recommendation encourages us to be active any way we can by making good choices such as taking the stairs instead of the elevator/escalator, parking at the end of the parking lots stores and walking the distance in and back out, looking for ways to be active with our families like going for a walk with our children or grandchildren, riding bikes with our family and friends, and instead of sitting on the couch and talking on the phone to friends or family drive to the mall and walk together while talking or talking together on the cell phone while we walk on treadmills or ride exercise bikes at home this can help encourage us to stick with a program to have accountability, or riding an exercise bike or walking on a treadmill or using an elliptical while watching our favorite television programs or movies for any period of time. For some people even 5 or 10 minutes 2 or 3 times a day would be extremely helpful. Omega 3 fatty acids such as are found in fish oil also raise the HDL. Each capsule of fish oil should have 800 mg or more of a combination of EPA & DHA - the active omega 3's- per capsule and you take between 1 cap per day until rechecked. Omega 3's also lower triglycerides and help lubricate joints to lower arthritis pain. Ulcer of esophagus without bleeding  -     pantoprazole (PROTONIX) 40 MG tablet; Take 1 tablet by mouth twice daily  -     Good control.  -     Continue meds and lifestyle control. Moderate asthma with acute exacerbation, unspecified whether persistent  Stable. Increase Symbicort with PERSISTENT shortness of breath. Anxiety  -     clonazePAM (KLONOPIN) 0.5 MG tablet; Take 1 tablet by mouth daily for 90 days. Exercise will help also. COVID-19 vaccine  Get the vaccine as soon as possible. If you had COVID-19 you should not need the vaccination for 90 days afterwards. This is because you already have antibodies to the virus.   The first shot usually is only associated with soreness in the arm unless you have already had medication to prevent dehydration which will worsen and prolong your flulike symptoms. If you have diarrhea but no nausea or vomiting you can take a zinc pill 50 mg over-the-counter during or immediately after a meal for the first dose of zinc and then decrease to 1/2 pill (25 mg) with a meal as needed for diarrhea or loose stools. The usual maximum dose for zinc is 50 mg twice a day. Watch for constipation when taking zinc.  If you are prone to diarrhea taking a probiotic several days before getting the vaccine and until symptoms resolve after the vaccine may help lessen risk of diarrhea and loose stools. Taking vitamin D before and after your shot for a few weeks will help the immune system and decrease flulike symptoms. The dosage depends on your previous vitamin D level. Anywhere from 400 IU (10 mcg) to 4000 IU (100 mcg) may be needed. It will also help your immune system to take vitamin C 500 mg once or twice a day. Return in about 3 months (around 11/24/2021) for Hypertension, Cholesterol, Anxiety, GERD. Subjective   SUBJECTIVE/OBJECTIVE:  Chief Complaint   Patient presents with    Hypertension     FOLLOW UP ON HTN    Hyperlipidemia     FOLLOW UP ON CHOLESTEROL   80  HPI    Essential hypertension  Not checking at home. Cuff is off and makes his BP go up when checks. Takes med religiously. Not adding salt and not eating salty snacks. Hypercholesterolemia  Less red meat. Eats chicken or fish. Salads 3-5x/wk. Fruit smoothy for lunch. Eggs 2 in am. More veggies and fruit. Low HDL (under 40)  On fish oil now. Is walking 1.5 mi/day. Ulcer of esophagus without bleeding  No sx. Hx 2 in esophagus. Moderate asthma with acute exacerbation, unspecified whether persistent  Uses the Symbicort 2 puffs qd. Albuterol as needed. Anxiety  In ER with panic attack. HR ~ 120. Was just mowing the lawn. Drinks 1 gal water/day.  Took Ativan   Left shoulder numbness  In July early on he got out of bed using just the arm and felt a pop and pain for a wk-10 days and numbness. Now feels weak in top out area (deltoid). Has an odd feeling every day. Review of Systems   Past Medical History:   Diagnosis Date    Anxiety     Asthma 01/01/2000    late 20's-early 30s.  Closed fracture of head of fifth metacarpal     Boxer's fracture    Former smoker 05/2019    started age 17, quit age 53    Fracture of finger of right hand     And knuckle    History of blood transfusion     new this admission 6/10/19    Hypertension     Iron deficiency anemia due to chronic blood loss 10/31/2019    DUE TO GI BLEED/ULCERS    Lumbar disc disease 01/01/1994    L4-5 proteuding disc    Peptic ulcer 01/01/2015    EGD 3/2/2020: Distal circumferentialesophageal ulcer and distal Parra's.   Gastric body and antrum gastritis       Past Surgical History:   Procedure Laterality Date    COLONOSCOPY  03/2015    1 benign polyp    ENDOSCOPY, COLON, DIAGNOSTIC      LUMBAR SPINE SURGERY  01/01/2002    epidural steroids x3    NASAL ENDOSCOPY  03/11/2020    UPPER GASTROINTESTINAL ENDOSCOPY  3/2915    3 ulcers - peptic and esophageal    UPPER GASTROINTESTINAL ENDOSCOPY  06/2019    +ulcers    UPPER GASTROINTESTINAL ENDOSCOPY N/A 06/11/2019    EGD DIAGNOSTIC ONLY performed by Irasema Lawton MD at [address] [place] [address]  08/19/2019    Dr. Antonia Boogie, ulcer healing in esophagitis, Parra's 1    UPPER GASTROINTESTINAL ENDOSCOPY N/A 01/06/2020    EGD CONTROL HEMORRHAGE performed by Elpidio Ruiz MD at [address] [place] [address] N/A 01/06/2020    EGD SUBMUCOSAL/ INJECTION OF EPINEPHRINE performed by Elpidio Ruiz MD at [address] [place] [address] N/A 03/02/2020    EGD BIOPSY performed by Sharlotte Apley, MD at [address] [place] [address]  01/01/1983    and 1989       Social History     Socioeconomic History    Marital status: Single Spouse name: Not on file    Number of children: 2    Years of education: 15    Highest education level: High school graduate   Occupational History    Occupation: FACTORY IN PAST     Comment: unemployed 2018   Tobacco Use    Smoking status: Former Smoker     Packs/day: 1.50     Years: 35.00     Pack years: 52.50     Types: Cigarettes     Start date: 1988     Quit date: 2019     Years since quittin.3    Smokeless tobacco: Former User     Types: Snuff    Tobacco comment: started to smoke at 16 / smoked up to 1.5 ppd / smoked for 35 yrs on and off / 1 PPD (QUIT [id number])   Vaping Use    Vaping Use: Never used   Substance and Sexual Activity    Alcohol use: No     Comment: rare alcohol    Drug use: Not Currently     Types: Marijuana     Comment: was daily. None since 2018    Sexual activity: Yes   Other Topics Concern    Not on file   Social History Narrative    Exercise - too light headed. 3/26/19. None. 1/15/20. Walks every am about 1.5 mi 7 days/wk. 20 2.3 mi every day. 20. Walks 2 mi/day. 7/15/20. Was having anxiety attacks while walking his dog so stopped for 2 months. 10/6/20. Walking 1.5 mi 7 day/wk. 21. Social Determinants of Health     Financial Resource Strain: Low Risk     Difficulty of Paying Living Expenses: Not hard at all   Food Insecurity: No Food Insecurity    Worried About Running Out of Food in the Last Year: Never true    Shyann of Food in the Last Year: Never true   Transportation Needs:     Lack of Transportation (Medical):      Lack of Transportation (Non-Medical):    Physical Activity:     Days of Exercise per Week:     Minutes of Exercise per Session:    Stress:     Feeling of Stress :    Social Connections:     Frequency of Communication with Friends and Family:     Frequency of Social Gatherings with Friends and Family:     Attends Latter day Services:     Active Member of Clubs or Organizations:     Attends Club or Organization Meetings:     Marital Status:    Intimate Partner Violence:     Fear of Current or Ex-Partner:     Emotionally Abused:     Physically Abused:     Sexually Abused:        Family History   Problem Relation Age of Onset    Other Mother         gall bladder    Allergies Mother     Heart Attack Father 52        x3-4    Coronary Art Dis Father     Alcohol Abuse Father     Allergies Father     Other Brother         congenital calcium deposits n bones    Allergies Brother     Hearing Loss Brother         congenital    Allergy (Severe) Brother     No Known Problems Brother         in a wreck as teen    Cancer Maternal Grandmother     Diabetes type 2  Maternal Grandmother     Cancer Maternal Grandfather     Asthma Paternal Grandfather     Cancer Maternal Aunt         brain    Other Maternal Aunt         lung issues    Lung Cancer Paternal Aunt     Lung Cancer Paternal Aunt        No Known Allergies    Current Outpatient Medications   Medication Sig Dispense Refill    Ginkgo Biloba 60 MG CAPS Take 1 capsule by mouth daily      Omega-3 Fatty Acids (OMEGA 3 PO) Take 1 capsule by mouth every morning (before breakfast) Indications: Decreased HDL Cholesterol      pantoprazole (PROTONIX) 40 MG tablet Take 1 tablet by mouth twice daily 30 tablet 0    metoprolol tartrate (LOPRESSOR) 25 MG tablet Take 1 tablet by mouth twice daily 180 tablet 0    albuterol sulfate HFA (PROVENTIL HFA) 108 (90 Base) MCG/ACT inhaler Inhale 2 puffs into the lungs every 4 hours as needed for Wheezing or Shortness of Breath Please provide pt c spacer as well 1 Inhaler 5    budesonide-formoterol (SYMBICORT) 160-4.5 MCG/ACT AERO Inhale 2 puffs into the lungs 2 times daily 3 Inhaler 5    clonazePAM (KLONOPIN) 0.5 MG tablet Take 1 tablet by mouth daily for 60 days.  30 tablet 1    lisinopril (PRINIVIL;ZESTRIL) 20 MG tablet Take 1/2 (one-half) tablet by mouth once daily 45 tablet 1    Zinc Acetate-Sweetleaf (FP ZINC LOZENGES PO) Take 1 lozenge by mouth 2 times daily      azelastine (ASTELIN) 0.1 % nasal spray 1 spray by Nasal route 2 times daily Use in each nostril as directed 1 Bottle 0    montelukast (SINGULAIR) 10 MG tablet Take 1 tablet by mouth nightly 30 tablet 1    vitamin C (ASCORBIC ACID) 500 MG tablet Take 500 mg by mouth 2 times daily      Cholecalciferol (VITAMIN D3) 50 MCG (2000 UT) CAPS Take 1 capsule by mouth      Multiple Vitamins-Minerals (THERAPEUTIC MULTIVITAMIN-MINERALS) tablet Take 1 tablet by mouth daily       No current facility-administered medications for this visit. Objective   Physical Exam  Vitals and nursing note reviewed. Constitutional:       General: He is not in acute distress. Appearance: He is well-developed. He is not diaphoretic. Eyes:      General: No scleral icterus. Right eye: No discharge. Left eye: No discharge. Conjunctiva/sclera: Conjunctivae normal.   Neck:      Thyroid: No thyroid mass or thyromegaly. Vascular: No carotid bruit or JVD. Trachea: Trachea and phonation normal. No tracheal tenderness or tracheal deviation. Cardiovascular:      Rate and Rhythm: Normal rate and regular rhythm. Heart sounds: Normal heart sounds, S1 normal and S2 normal. Heart sounds not distant. No murmur heard. No friction rub. No gallop. No S3 or S4 sounds. Pulmonary:      Effort: Pulmonary effort is normal. No respiratory distress. Breath sounds: Normal breath sounds. No stridor. No decreased breath sounds, wheezing, rhonchi or rales. Musculoskeletal:      Cervical back: Neck supple. Lymphadenopathy:      Head:      Right side of head: No submandibular or tonsillar adenopathy. Left side of head: No submandibular or tonsillar adenopathy. Cervical: No cervical adenopathy. Right cervical: No superficial, deep or posterior cervical adenopathy. Left cervical: No superficial, deep or posterior cervical adenopathy.    Skin: General: Skin is warm and dry. Coloration: Skin is not pale. Neurological:      Mental Status: He is alert. Deep Tendon Reflexes:      Reflex Scores:       Patellar reflexes are 2+ on the right side and 2+ on the left side. Psychiatric:         Attention and Perception: Attention and perception normal.         Mood and Affect: Mood and affect normal.         Speech: Speech normal.         Behavior: Behavior normal. Behavior is cooperative. Cognition and Memory: Cognition and memory normal.         Judgment: Judgment normal.        Vitals:    08/24/21 0806   BP: 120/80   Site: Left Upper Arm   Position: Sitting   Cuff Size: Large Adult   Pulse: 82   Resp: 20   SpO2: 98%   Weight: 218 lb 3.2 oz (99 kg)   Height: 6' (1.829 m)     BP Readings from Last 3 Encounters:   08/24/21 120/80   06/05/21 125/85   05/04/21 130/86     Pulse Readings from Last 3 Encounters:   08/24/21 82   06/05/21 108   05/04/21 102     Wt Readings from Last 3 Encounters:   08/24/21 218 lb 3.2 oz (99 kg)   06/05/21 227 lb 15.3 oz (103.4 kg)   05/04/21 232 lb (105.2 kg)     Body mass index is 29.59 kg/m².      6/5/2021 12:11 6/5/2021 13:33   Sodium 132 (L)    Potassium 4.0    Chloride 99    CO2 20 (L)    BUN 19    Creatinine 1.1    Anion Gap 13    GFR Non-African American >60    Glucose 102 (H)    Calcium 9.1    Pro-BNP 6    Troponin <0.01    WBC  16.6 (H)   RBC  4.95   Hemoglobin Quant  14.5   Hematocrit  42.5   MCV  85.9   MCH  29.3   MCHC  34.1   MPV  8.3   RDW  13.8   Platelet Count  252   Neutrophils %  85.5   Lymphocyte %  9.0   Monocytes %  4.7   Eosinophils %  0.5   Basophils %  0.3   Neutrophils Absolute  14.2 (H)   Lymphocytes Absolute  1.5   Monocytes Absolute  0.8   Eosinophils Absolute  0.1   Basophils Absolute  0.0     ONE XRAY VIEW OF THE CHEST  6/5/2021 12:04 pm  COMPARISON:  01/06/2020  HISTORY:  ORDERING SYSTEM PROVIDED HISTORY: sob  TECHNOLOGIST PROVIDED HISTORY:  Reason for exam:->sob  Reason for Exam: Anxiety (anxiety attack now resolved with ativan)  Acuity: Acute  Type of Exam: Initial     FINDINGS:  The cardiomediastinal silhouette is unremarkable. The lungs are clear. No  infiltrate, pleural fluid or evidence of overt failure.     IMPRESSION:  No acute cardiopulmonary disease. EKG  Normal sinus rhythm. Normal ECG. When compared with ECG of 06-JAN-2020 08:37, T wave inversion now evident in Inferior leads. Confirmed      On this date 8/24/2021 I have spent 42 minutes reviewing previous notes, test results and face to face with the patient discussing the diagnosis and importance of compliance with the treatment plan as well as documenting on the day of the visit. An electronic signature was used to authenticate this note.     --Junior Mcmullen, DO

## 2021-09-27 ENCOUNTER — TELEPHONE (OUTPATIENT)
Dept: FAMILY MEDICINE CLINIC | Age: 51
End: 2021-09-27

## 2021-09-27 DIAGNOSIS — B96.89 ACUTE BACTERIAL SINUSITIS: Primary | ICD-10-CM

## 2021-09-27 DIAGNOSIS — J01.90 ACUTE BACTERIAL SINUSITIS: Primary | ICD-10-CM

## 2021-09-27 NOTE — TELEPHONE ENCOUNTER
PT C/O NASAL DRAINAGE:  NO  WHAT COLOR:     COUGH: NO    BRINGING UP PHLEGM:  NO  IF YES, WHAT COLOR:      SORE THROAT: NO    POST NASAL DRIP:  NO    HEADACHE:  NO    EAR PAIN:  NO  LEFT, RIGHT OR BOTH:       S.O.B.: R WITHOUT EXERTION: NO,     WHEEZING:  NO    HEAD CONGESTION:  YES    CHEST CONGESTION:  NO    BODY ACHES:  NO     VOMITING: NO    DIARRHEA:  NO    TEMP:  NO   IF SO, HIGHEST TEMP:   SYMPTOMS FOR HOW MANY DAYS:  13 DAYS     WHAT MEDS HAS PT TRIED: VITAMIN C, ZINC  -       MED ALLERGIES: NO  VERIFY PHARMACY INFORMATION.       APPT OFFERED / REFUSED:    IF REFUSED, WHY:    RUN DOWN, NO ENERGY, TIRED, PRESSURE IN CHECK AND EYE AREAS  COVID -19 TEST - NEGATIVE     PT @  [address] [place] [address],[address] [facility], [place] - [address] [facility] [phone] - F [phone]

## 2021-09-28 RX ORDER — AMOXICILLIN AND CLAVULANATE POTASSIUM 875; 125 MG/1; MG/1
1 TABLET, FILM COATED ORAL 2 TIMES DAILY WITH MEALS
Qty: 20 TABLET | Refills: 0 | Status: SHIPPED | OUTPATIENT
Start: 2021-09-28 | End: 2021-10-08

## 2021-11-29 DIAGNOSIS — I10 ESSENTIAL HYPERTENSION: ICD-10-CM

## 2021-11-29 DIAGNOSIS — K22.10 ULCER OF ESOPHAGUS WITHOUT BLEEDING: ICD-10-CM

## 2021-11-29 RX ORDER — PANTOPRAZOLE SODIUM 40 MG/1
TABLET, DELAYED RELEASE ORAL
Qty: 180 TABLET | Refills: 0 | Status: SHIPPED | OUTPATIENT
Start: 2021-11-29 | End: 2022-02-28

## 2021-12-07 ENCOUNTER — OFFICE VISIT (OUTPATIENT)
Dept: FAMILY MEDICINE CLINIC | Age: 51
End: 2021-12-07
Payer: MEDICARE

## 2021-12-07 VITALS
BODY MASS INDEX: 29.8 KG/M2 | HEIGHT: 72 IN | SYSTOLIC BLOOD PRESSURE: 116 MMHG | HEART RATE: 84 BPM | WEIGHT: 220 LBS | DIASTOLIC BLOOD PRESSURE: 82 MMHG | RESPIRATION RATE: 16 BRPM

## 2021-12-07 DIAGNOSIS — F41.9 ANXIETY: ICD-10-CM

## 2021-12-07 DIAGNOSIS — I10 ESSENTIAL HYPERTENSION: Primary | ICD-10-CM

## 2021-12-07 DIAGNOSIS — D72.829 LEUKOCYTOSIS, UNSPECIFIED TYPE: ICD-10-CM

## 2021-12-07 DIAGNOSIS — J45.901 MODERATE ASTHMA WITH ACUTE EXACERBATION, UNSPECIFIED WHETHER PERSISTENT: ICD-10-CM

## 2021-12-07 DIAGNOSIS — E78.00 HYPERCHOLESTEROLEMIA: ICD-10-CM

## 2021-12-07 DIAGNOSIS — E55.9 VITAMIN D DEFICIENCY: ICD-10-CM

## 2021-12-07 DIAGNOSIS — K22.10 ULCER OF ESOPHAGUS WITHOUT BLEEDING: ICD-10-CM

## 2021-12-07 PROCEDURE — G8484 FLU IMMUNIZE NO ADMIN: HCPCS | Performed by: FAMILY MEDICINE

## 2021-12-07 PROCEDURE — G8427 DOCREV CUR MEDS BY ELIG CLIN: HCPCS | Performed by: FAMILY MEDICINE

## 2021-12-07 PROCEDURE — G8417 CALC BMI ABV UP PARAM F/U: HCPCS | Performed by: FAMILY MEDICINE

## 2021-12-07 PROCEDURE — 3017F COLORECTAL CA SCREEN DOC REV: CPT | Performed by: FAMILY MEDICINE

## 2021-12-07 PROCEDURE — 99214 OFFICE O/P EST MOD 30 MIN: CPT | Performed by: FAMILY MEDICINE

## 2021-12-07 PROCEDURE — 1036F TOBACCO NON-USER: CPT | Performed by: FAMILY MEDICINE

## 2021-12-07 RX ORDER — CLONAZEPAM 0.5 MG/1
0.5 TABLET ORAL DAILY
Qty: 30 TABLET | Refills: 2 | Status: SHIPPED | OUTPATIENT
Start: 2021-12-30 | End: 2022-03-08 | Stop reason: SDUPTHER

## 2021-12-07 SDOH — ECONOMIC STABILITY: TRANSPORTATION INSECURITY
IN THE PAST 12 MONTHS, HAS THE LACK OF TRANSPORTATION KEPT YOU FROM MEDICAL APPOINTMENTS OR FROM GETTING MEDICATIONS?: PATIENT DECLINED

## 2021-12-07 SDOH — ECONOMIC STABILITY: FOOD INSECURITY: WITHIN THE PAST 12 MONTHS, YOU WORRIED THAT YOUR FOOD WOULD RUN OUT BEFORE YOU GOT MONEY TO BUY MORE.: PATIENT DECLINED

## 2021-12-07 SDOH — ECONOMIC STABILITY: TRANSPORTATION INSECURITY
IN THE PAST 12 MONTHS, HAS LACK OF TRANSPORTATION KEPT YOU FROM MEETINGS, WORK, OR FROM GETTING THINGS NEEDED FOR DAILY LIVING?: PATIENT DECLINED

## 2021-12-07 SDOH — ECONOMIC STABILITY: FOOD INSECURITY: WITHIN THE PAST 12 MONTHS, THE FOOD YOU BOUGHT JUST DIDN'T LAST AND YOU DIDN'T HAVE MONEY TO GET MORE.: PATIENT DECLINED

## 2021-12-07 ASSESSMENT — ENCOUNTER SYMPTOMS
BLOOD IN STOOL: 0
WHEEZING: 1
VOMITING: 0
DIARRHEA: 0
ABDOMINAL DISTENTION: 0
COUGH: 0
CHOKING: 0
ABDOMINAL PAIN: 0
SHORTNESS OF BREATH: 0
NAUSEA: 0
CONSTIPATION: 0
CHEST TIGHTNESS: 1

## 2021-12-07 ASSESSMENT — SOCIAL DETERMINANTS OF HEALTH (SDOH): HOW HARD IS IT FOR YOU TO PAY FOR THE VERY BASICS LIKE FOOD, HOUSING, MEDICAL CARE, AND HEATING?: PATIENT DECLINED

## 2021-12-07 NOTE — PATIENT INSTRUCTIONS
GENERAL OFFICE POLICIES      Telephone Calls: Messages will be answered within 1-2 business days, unless the provider is out of the office. If it is urgent a covering provider will answer. (this does not include Medication refills). MyChart: We recommend all patients sign up for [employer]. Through this portal you can see your lab results, request refills, schedule appointments, pay your bill and send messages to the office. [employer] messages will be answered within 1-2 business days unless the provider is out of the office. For urgent matters, please call the office. Appointments:  All appointments must be scheduled. We ask all patients to schedule their next follow up appointment before they leave the office to make sure you will be able to be seen before you run out of medications. 24 hours notice is required to cancel or reschedule an appointment to avoid being marked as a no show. You may be dismissed from the practice after 3 no shows. LATE for Appointment: If you are 15 or more minutes late for your appointment, you may be asked to reschedule. MA/LAB APPTS: Must be scheduled, cannot accept walk in lab visits. We only draw labs for patients established in our office. We only do injections for medications ordered by our office. Acute Sick Visits:  Nothing other than acute complaint will be addressed at this visit. TRADITIONAL MEDICARE  DOES NOT COVER PHYSICALS  MEDICARE WELLNESS VISITS: These are NOT physicals but the free annual visit offered by Medicare to discuss wellness issues. Medication refills, checkups, etc. will not be addressed during this visit. Medication Refills: Refills are handled electronically so please contact your pharmacy for medication refills even if current refills have been exhausted. If you are on a controlled medication you will be referred to a specialist (pain specialist, psychiatry, etc). Forms:  There is a $35 fee to fill out FMLA/Disability paperwork, payable at time of . Instead of the fee, you can choose to have the paperwork filled out during a separate office visit that is for filling out the paperwork only. Medication Samples: This office does not carry medication samples. If you need assistance in getting your medications, then please let the medical assistant know so they can help you sign up for a drug assistance program that can help get medications at a reduced cost or even free (if you qualify). Workman's Comp Claims: We do not handle workman's comp cases or claims. You will need to go to an urgent care to be seen or to whomever your employer uses. General - Any abusive/rude behavior toward staff/providers may be cause for dismissal.     Cristino Sandhoff was seen today for hypertension, hyperlipidemia, anxiety and gastroesophageal reflux. Diagnoses and all orders for this visit:    Essential hypertension  -     Good control.  -     Continue meds and lifestyle control. Anxiety  -     clonazePAM (KLONOPIN) 0.5 MG tablet; Take 1 tablet by mouth daily for 90 days.  -     Good control.  -     Continue meds and lifestyle control. Ulcer of esophagus without bleeding  -     Good control.  -     Continue meds and lifestyle control. Hypercholesterolemia  -     Poor control.  -     Continue meds and lifestyle control. Moderate asthma with acute exacerbation, unspecified whether persistent  -     Good control.  -     Continue meds and lifestyle control.

## 2021-12-07 NOTE — PROGRESS NOTES
wellness issues. Medication refills, checkups, etc. will not be addressed during this visit. Medication Refills: Refills are handled electronically so please contact your pharmacy for medication refills even if current refills have been exhausted. If you are on a controlled medication you will be referred to a specialist (pain specialist, psychiatry, etc). Forms: There is a $35 fee to fill out FMLA/Disability paperwork, payable at time of . Instead of the fee, you can choose to have the paperwork filled out during a separate office visit that is for filling out the paperwork only. Medication Samples: This office does not carry medication samples. If you need assistance in getting your medications, then please let the medical assistant know so they can help you sign up for a drug assistance program that can help get medications at a reduced cost or even free (if you qualify). Workman's Comp Claims: We do not handle workman's comp cases or claims. You will need to go to an urgent care to be seen or to whomever your employer uses. General - Any abusive/rude behavior toward staff/providers may be cause for dismissal.     Torri Valladares was seen today for hypertension, hyperlipidemia, anxiety and gastroesophageal reflux. Diagnoses and all orders for this visit:    Essential hypertension  -     Good control.  -     Continue meds and lifestyle control. Anxiety  -     clonazePAM (KLONOPIN) 0.5 MG tablet; Take 1 tablet by mouth daily for 90 days.  -     Good control.  -     Continue meds and lifestyle control. Ulcer of esophagus without bleeding  -     Good control.  -     Continue meds and lifestyle control. Hypercholesterolemia  -     Poor control.  -     Continue meds and lifestyle control. Moderate asthma with acute exacerbation, unspecified whether persistent  -     Good control.  -     Continue meds and lifestyle control.      Orders Placed This Encounter   Procedures    CBC Auto Differential Standing Status:   Future     Standing Expiration Date:   12/15/2022    Comprehensive Metabolic Panel     Standing Status:   Future     Standing Expiration Date:   12/15/2022    Homocysteine, Serum     Standing Status:   Future     Standing Expiration Date:   12/15/2022    Lipid Panel     Standing Status:   Future     Standing Expiration Date:   12/15/2022     Order Specific Question:   Is Patient Fasting?/# of Hours     Answer:   15    Vitamin D 25 Hydroxy     Standing Status:   Future     Standing Expiration Date:   12/15/2022     Return in about 3 months (around 3/7/2022) for Hypertension, Anxiety. Subjective   SUBJECTIVE/OBJECTIVE:   Chief Complaint   Patient presents with    Hypertension     3 MO HTN ROUTINE FOLLOW UP    Hyperlipidemia     3 MO CHOLESTEROL ROUTINE FOLLOW UP    Anxiety     3 MO ANXIETY ROUTINE FOLLOW UP, LAST UDS AND LAST MED CONTRACT 01/04/2021    Gastroesophageal Reflux     3 MO GERD ROUTINE FOLLOW UP   [phone]. Then 815. HPI    Essential hypertension  Not adding salt. Eats chips once in awhile/rarely. No SE's with med. No recent cough. Anxiety  Comes and goes. Last panic attack in June. Was in ER. Helps to talk with someone. Calls his mother usually and takes his mind off of it. Takes med qhs routinely. Sleeps ok x 6 hrs. Feels rested after 1/2 hr. Drinks 2 cups coffee/day. No pop. Ulcer of esophagus without bleeding  No difficulty swallowing. No bloody nor black tarry stools. No NSAIDs. Hypercholesterolemia  Watching cholesterol better. More roughage and greens. Less red meat and more chicken/fish. More fruit. Moderate asthma with acute exacerbation, unspecified whether persistent  Had allergies for 2 wks with itchy eyes, runny nose, sleepiness. Occurs 6x/yr. Did cause more asthma. Is taking 2 puffs Symbicort bid. Does gargle with water when uses. Uses  Albuterol MDI if out and has sx rarely. No spacer. Used to keep one in truck and 1 at home.      8/24/21Note reviewed. Left shoulder numbness  In July early on he got out of bed using just the arm and felt a pop and pain for a wk-10 days and numbness. Now feels weak in top out area (deltoid). Has an odd feeling every day. Review of Systems   Respiratory: Positive for chest tightness and wheezing. Negative for cough, choking and shortness of breath. Cardiovascular: Negative for chest pain, palpitations and leg swelling. Gastrointestinal: Negative for abdominal distention, abdominal pain, blood in stool, constipation, diarrhea, nausea and vomiting. Neurological: Positive for headaches (sinus when wakes up. Center of forehead. ). Negative for dizziness and light-headedness. Past Medical History:   Diagnosis Date    Anxiety     Asthma 01/01/2000    late 20's-early 30s.  Closed fracture of head of fifth metacarpal     Boxer's fracture    Former smoker 05/2019    started age 17, quit age 53    Fracture of finger of right hand     And knuckle    History of blood transfusion     new this admission 6/10/19    Hypertension     Iron deficiency anemia due to chronic blood loss 10/31/2019    DUE TO GI BLEED/ULCERS    Lumbar disc disease 01/01/1994    L4-5 proteuding disc    Peptic ulcer 01/01/2015    EGD 3/2/2020: Distal circumferentialesophageal ulcer and distal Parra's. Gastric body and antrum gastritis       Past Surgical History:   Procedure Laterality Date    COLONOSCOPY  03/2015    1 benign polyp. Fernando/Clyde Ricketts.     ENDOSCOPY, COLON, DIAGNOSTIC      LUMBAR SPINE SURGERY  01/01/2002    epidural steroids x3    NASAL ENDOSCOPY  03/11/2020    UPPER GASTROINTESTINAL ENDOSCOPY  3/2015    3 ulcers - peptic and esophageal    UPPER GASTROINTESTINAL ENDOSCOPY  06/2019    +ulcers    UPPER GASTROINTESTINAL ENDOSCOPY N/A 06/11/2019    EGD DIAGNOSTIC ONLY performed by Marce Macedo MD at [facility]  08/19/2019    Dr. Yelitza Segovia, [facility], ulcer healing in esophagitis, Parra's 1    UPPER GASTROINTESTINAL ENDOSCOPY N/A 2020    EGD CONTROL HEMORRHAGE performed by Vargas Alcala MD at [address] [place] [address] N/A 2020    EGD SUBMUCOSAL/ INJECTION OF EPINEPHRINE performed by Vargas Alcala MD at [address] [place] [address] 2020    EGD BIOPSY performed by Esha Phillips MD at [address] [place] [address]  1983    and        Social History     Socioeconomic History    Marital status: Single     Spouse name: Not on file    Number of children: 2    Years of education: 12    Highest education level: High school graduate   Occupational History    Occupation: FACTORY IN PAST     Comment: unemployed 2018   Tobacco Use    Smoking status: Former Smoker     Packs/day: 1.50     Years: 35.00     Pack years: 52.50     Types: Cigarettes     Start date: 1988     Quit date: 2019     Years since quittin.6    Smokeless tobacco: Former User     Types: Snuff    Tobacco comment: started to smoke at 16 / smoked up to 1.5 ppd / smoked for 35 yrs on and off / 1 PPD (QUIT [id number])   Vaping Use    Vaping Use: Never used   Substance and Sexual Activity    Alcohol use: No     Comment: rare alcohol    Drug use: Not Currently     Types: Marijuana Hulon Kramer)     Comment: was daily. None since 2018    Sexual activity: Yes   Other Topics Concern    Not on file   Social History Narrative    Exercise - too light headed. 3/26/19. None. 1/15/20. Walks every am about 1.5 mi 7 days/wk. 20 2.3 mi every day. 20. Walks 2 mi/day. 7/15/20. Was having anxiety attacks while walking his dog so stopped for 2 months. 10/6/20. Walking 1.5 mi 7 day/wk. 21. 1.5-1.75 mi/day x7 day/wk. 21.      Social Determinants of Health     Financial Resource Strain: Unknown    Difficulty of Paying Living Expenses: Patient refused   Food Insecurity: Unknown    Worried About Running Out of Food in the Last Year: Patient refused   [address] in the Last Year: Patient refused   Transportation Needs: Unknown    Lack of Transportation (Medical): Patient refused    Lack of Transportation (Non-Medical): Patient refused   Physical Activity:     Days of Exercise per Week: Not on file   [employer] of Exercise per Session: Not on file   Stress:     Feeling of Stress : Not on file   Social Connections:     Frequency of Communication with Friends and Family: Not on file    Frequency of Social Gatherings with Friends and Family: Not on file    Attends Church Services: Not on file    Active Member of [address] [place] [address] [employer] or Organizations: Not on file    Attends Club or Organization Meetings: Not on file    Marital Status: Not on file   Intimate Partner Violence:     Fear of Current or Ex-Partner: Not on file    Emotionally Abused: Not on file    Physically Abused: Not on file    Sexually Abused: Not on file   Housing Stability:     Unable to Pay for Housing in the Last Year: Not on file    Number of [place] in the Last Year: Not on file    Unstable Housing in the Last Year: Not on file       Family History   Problem Relation Age of Onset    Other Mother         gall bladder    Allergies Mother     Heart Attack Father 52        x3-4    Coronary Art Dis Father     Alcohol Abuse Father     Allergies Father     Other Brother         congenital calcium deposits n bones, COVID-19@ 48.     Allergies Brother     Hearing Loss Brother         congenital    Allergy (Severe) Brother     No Known Problems Brother         in a wreck as teen    Cancer Maternal Grandmother     Diabetes type 2  Maternal Grandmother     Cancer Maternal Grandfather     Asthma Paternal Grandfather     Cancer Maternal Aunt         brain    Other Maternal Aunt         lung issues    Lung Cancer Paternal Aunt     Lung Cancer Paternal Aunt        No Known Allergies    Current Outpatient Medications   Medication Sig Dispense Refill    metoprolol tartrate (LOPRESSOR) 25 MG tablet Take 1 tablet by mouth twice daily 60 tablet 0    pantoprazole (PROTONIX) 40 MG tablet Take 1 tablet by mouth twice daily 180 tablet 0    Ginkgo Biloba 60 MG CAPS Take 1 capsule by mouth daily      lisinopril (PRINIVIL;ZESTRIL) 20 MG tablet Take 1/2 (one-half) tablet by mouth once daily 45 tablet 1    Omega-3 Fatty Acids (OMEGA 3 PO) Take 1 capsule by mouth every morning (before breakfast) Indications: Decreased HDL Cholesterol      clonazePAM (KLONOPIN) 0.5 MG tablet Take 1 tablet by mouth daily for 90 days. 30 tablet 2    albuterol sulfate HFA (PROVENTIL HFA) 108 (90 Base) MCG/ACT inhaler Inhale 2 puffs into the lungs every 4 hours as needed for Wheezing or Shortness of Breath Please provide pt c spacer as well 1 Inhaler 5    budesonide-formoterol (SYMBICORT) 160-4.5 MCG/ACT AERO Inhale 2 puffs into the lungs 2 times daily 3 Inhaler 5    Zinc Acetate-Sweetleaf (FP ZINC LOZENGES PO) Take 0.5 lozenges by mouth 2 times daily       azelastine (ASTELIN) 0.1 % nasal spray 1 spray by Nasal route 2 times daily Use in each nostril as directed 1 Bottle 0    montelukast (SINGULAIR) 10 MG tablet Take 1 tablet by mouth nightly 30 tablet 1    vitamin C (ASCORBIC ACID) 500 MG tablet Take 500 mg by mouth 2 times daily      Cholecalciferol (VITAMIN D3) 50 MCG (2000 UT) CAPS Take 1 capsule by mouth daily       Multiple Vitamins-Minerals (THERAPEUTIC MULTIVITAMIN-MINERALS) tablet Take 1 tablet by mouth daily       No current facility-administered medications for this visit. Objective   Physical Exam  Vitals and nursing note reviewed. Constitutional:       General: He is not in acute distress. Appearance: He is well-developed. He is not diaphoretic. Eyes:      General: No scleral icterus. Right eye: No discharge. Left eye: No discharge.       Conjunctiva/sclera: Conjunctivae normal.   Neck:      Thyroid: No thyroid mass or thyromegaly. Vascular: No carotid bruit or JVD. Trachea: Trachea and phonation normal. No tracheal tenderness or tracheal deviation. Cardiovascular:      Rate and Rhythm: Normal rate and regular rhythm. Heart sounds: Normal heart sounds, S1 normal and S2 normal. Heart sounds not distant. No murmur heard. No friction rub. No gallop. No S3 or S4 sounds. Pulmonary:      Effort: Pulmonary effort is normal. No respiratory distress. Breath sounds: Normal breath sounds. No stridor. No decreased breath sounds, wheezing, rhonchi or rales. Abdominal:      General: There is no distension. Tenderness: There is no abdominal tenderness (no epigastric tenderness. ). Musculoskeletal:      Cervical back: Neck supple. Lymphadenopathy:      Head:      Right side of head: No submandibular or tonsillar adenopathy. Left side of head: No submandibular or tonsillar adenopathy. Cervical: No cervical adenopathy. Right cervical: No superficial, deep or posterior cervical adenopathy. Left cervical: No superficial, deep or posterior cervical adenopathy. Skin:     General: Skin is warm and dry. Coloration: Skin is not pale. Neurological:      Mental Status: He is alert. Deep Tendon Reflexes:      Reflex Scores:       Patellar reflexes are 2+ on the right side and 2+ on the left side. Psychiatric:         Attention and Perception: Attention and perception normal.         Mood and Affect: Mood and affect normal.         Speech: Speech normal.         Behavior: Behavior normal. Behavior is cooperative.          Cognition and Memory: Cognition and memory normal.         Judgment: Judgment normal.        Vitals:    12/07/21 0806   BP: 116/82   Site: Right Upper Arm   Position: Sitting   Cuff Size: Medium Adult   Pulse: 84   Resp: 16   Weight: 220 lb (99.8 kg)   Height: 6' (1.829 m)     BP Readings from Last 3 Encounters:   12/07/21 116/82   08/24/21 120/80   06/05/21 125/85 Pulse Readings from Last 3 Encounters:   12/07/21 84   08/24/21 82   06/05/21 108     Wt Readings from Last 3 Encounters:   12/07/21 220 lb (99.8 kg)   08/24/21 218 lb 3.2 oz (99 kg)   06/05/21 227 lb 15.3 oz (103.4 kg)     Body mass index is 29.84 kg/m². On this date 12/7/2021 I have spent 42 minutes reviewing previous notes, test results and face to face with the patient discussing the diagnosis and importance of compliance with the treatment plan as well as documenting on the day of the visit. An electronic signature was used to authenticate this note.     --Edwardo Silva, DO

## 2021-12-07 NOTE — Clinical Note
Notify patient that fasting labs will be drawn at his 3/8/2022 visit. Read Fasting instructions from his [employer] message. If he does not have access to his [employer] account please help him get a new password.

## 2022-01-03 ENCOUNTER — NURSE TRIAGE (OUTPATIENT)
Dept: OTHER | Facility: CLINIC | Age: 52
End: 2022-01-03

## 2022-01-03 DIAGNOSIS — I10 ESSENTIAL HYPERTENSION: ICD-10-CM

## 2022-01-03 NOTE — TELEPHONE ENCOUNTER
Received call from [employer] Parisa  at [facility], caller not on line. Complaint: dizziness     Market: [employer]    Practice Name: [employer]    Caller's telephone number verified as [phone]    Connected with caller via phone, please see below triage    Received call from  [employer] Parisa  [facility]. Subjective: Caller states \"I feel awful \"     Current Symptoms:   Pt states he has weakness  Of upper body  Onset  Nov 2020 for 6-8 wks  Returned in the past 10 days     Onset: 10 days ago; gradual    Associated Symptoms:     Weakness   Sternum to arms  Pt states he feels weak       Pain Severity: n/a    Temperature: denies     What has been tried: n/a    LMP: NA Pregnant: NA    Recommended disposition: ED/UCC or pcp office with approval     Care advice provided, patient verbalizes understanding; denies any other questions or concerns; instructed to call back for any new or worsening symptoms. Transferred to [facility]  @  [facility]    Attention Provider: Thank you for allowing me to participate in the care of your patient. The patient was connected to triage in response to information provided to the ECC/PSC. Please do not respond through this encounter as the response is not directed to a shared pool.             Reason for Disposition   Patient sounds very sick or weak to the triager    Protocols used: WEAKNESS (GENERALIZED) AND FATIGUE-ADULT-[place]

## 2022-01-04 ENCOUNTER — NURSE TRIAGE (OUTPATIENT)
Dept: OTHER | Facility: CLINIC | Age: 52
End: 2022-01-04

## 2022-01-04 NOTE — TELEPHONE ENCOUNTER
Duplicate      Reason for Disposition   Caller has already spoken with the PCP and has no further questions.     Protocols used: NO CONTACT OR DUPLICATE CONTACT CALL-ADULT-

## 2022-01-04 NOTE — TELEPHONE ENCOUNTER
Caller stated no one called him back to schedule an appointment yesterday. He still needs to see his PCP. ECC agent Zaira transferred the call to Dr. Gino Gupta office. Caller transferred to Eleuterio  in Dr. Gino Gupta office. Dr. Moe Lozaers taking responsibility for this patient.

## 2022-01-05 ENCOUNTER — VIRTUAL VISIT (OUTPATIENT)
Dept: FAMILY MEDICINE CLINIC | Age: 52
End: 2022-01-05
Payer: MEDICARE

## 2022-01-05 ENCOUNTER — TELEPHONE (OUTPATIENT)
Dept: FAMILY MEDICINE CLINIC | Age: 52
End: 2022-01-05

## 2022-01-05 DIAGNOSIS — Q04.8 CEREBELLAR TONSILLAR ECTOPIA (HCC): ICD-10-CM

## 2022-01-05 DIAGNOSIS — M54.12 CERVICAL RADICULOPATHY: ICD-10-CM

## 2022-01-05 DIAGNOSIS — J45.901 MODERATE ASTHMA WITH ACUTE EXACERBATION, UNSPECIFIED WHETHER PERSISTENT: ICD-10-CM

## 2022-01-05 DIAGNOSIS — R07.89 CHEST PRESSURE: Primary | ICD-10-CM

## 2022-01-05 PROCEDURE — 3017F COLORECTAL CA SCREEN DOC REV: CPT | Performed by: FAMILY MEDICINE

## 2022-01-05 PROCEDURE — G8484 FLU IMMUNIZE NO ADMIN: HCPCS | Performed by: FAMILY MEDICINE

## 2022-01-05 PROCEDURE — 99214 OFFICE O/P EST MOD 30 MIN: CPT | Performed by: FAMILY MEDICINE

## 2022-01-05 PROCEDURE — G8417 CALC BMI ABV UP PARAM F/U: HCPCS | Performed by: FAMILY MEDICINE

## 2022-01-05 PROCEDURE — G8427 DOCREV CUR MEDS BY ELIG CLIN: HCPCS | Performed by: FAMILY MEDICINE

## 2022-01-05 PROCEDURE — 1036F TOBACCO NON-USER: CPT | Performed by: FAMILY MEDICINE

## 2022-01-05 ASSESSMENT — ENCOUNTER SYMPTOMS
SINUS PAIN: 0
VOMITING: 0
CHEST TIGHTNESS: 0
ABDOMINAL DISTENTION: 0
COUGH: 0
RHINORRHEA: 0
EYE DISCHARGE: 1
WHEEZING: 0
ABDOMINAL PAIN: 0
NAUSEA: 0
SINUS PRESSURE: 1
SORE THROAT: 0
BLOOD IN STOOL: 0
CONSTIPATION: 0
DIARRHEA: 0
SHORTNESS OF BREATH: 1

## 2022-01-05 NOTE — PROGRESS NOTES
they seem to help. Cerebellar tonsillar ectopia (HCC)  Information on the subject below. No follow-ups on file. Subjective   SUBJECTIVE/OBJECTIVE:  Chief Complaint   Patient presents with    Other     PT C/O NUMBNESS IN HIS BILATERAL HANDS, UPPER BODY JUST FEELS LIKE \"CRAP\" IN HIS UPPER BODY. NEVER EXPERIENCED THIS BEFORE. NO PINCHED NERVE. 301  HPI  Numbness  The first time he had the sensation that was in November 2020. It lasted 6 to 8 weeks. Were off in January 2021. He also has \"upper body illness\". He feels rundown. It is all centrally located in the chest cavity. He thought it was his lungs at first.  Then it went to the arms and hands. No fever no runny nose no cough no sore throat. He talked to his mother. She is worried that it could be something more severe. He is not taking anti-inflammatories. Between [place] and [place] he had cold-like symptoms. He has a history of recurring mold in his bedroom. Has not come back for a year since he treated walls with over-the-counter stuff. Review of Systems   Constitutional: Negative for chills, diaphoresis and fever. HENT: Positive for sinus pressure ( Chronic) and sneezing. Negative for congestion, ear pain, rhinorrhea, sinus pain and sore throat. Eyes: Positive for discharge. Respiratory: Positive for shortness of breath ( Intermittently worse when he is feeling sick like this). Negative for cough, chest tightness and wheezing. Cardiovascular: Negative for chest pain. Gastrointestinal: Negative for abdominal distention, abdominal pain, blood in stool, constipation, diarrhea, nausea and vomiting. Neurological: Positive for numbness ( Tingling all the way to both hands. ). Past Medical History:   Diagnosis Date    Anxiety     Asthma 01/01/2000    late 20's-early 30s.     Closed fracture of head of fifth metacarpal     Boxer's fracture    Former smoker 05/2019    started age 17, quit age 53    Fracture of finger of right hand     And knuckle    History of blood transfusion     new this admission 6/10/19    Hypertension     Iron deficiency anemia due to chronic blood loss 10/31/2019    DUE TO GI BLEED/ULCERS    Lumbar disc disease 01/01/1994    L4-5 proteuding disc    Peptic ulcer 01/01/2015    EGD 3/2/2020: Distal circumferentialesophageal ulcer and distal Parra's. Gastric body and antrum gastritis       Past Surgical History:   Procedure Laterality Date    COLONOSCOPY  03/2015    1 benign polyp. Fernando/Clyde Ricketts.     ENDOSCOPY, COLON, DIAGNOSTIC      LUMBAR SPINE SURGERY  01/01/2002    epidural steroids x3    NASAL ENDOSCOPY  03/11/2020    UPPER GASTROINTESTINAL ENDOSCOPY  3/2015    3 ulcers - peptic and esophageal    UPPER GASTROINTESTINAL ENDOSCOPY  06/2019    +ulcers    UPPER GASTROINTESTINAL ENDOSCOPY N/A 06/11/2019    EGD DIAGNOSTIC ONLY performed by Monica Yousif MD at [facility]  08/19/2019    Dr. Maribel Singh, ulcer healing in esophagitis, Parra's 1    UPPER GASTROINTESTINAL ENDOSCOPY N/A 01/06/2020    EGD CONTROL HEMORRHAGE performed by Marianne Genao MD at [facility] 01/06/2020    EGD SUBMUCOSAL/ INJECTION OF EPINEPHRINE performed by Marianne Genao MD at [facility] 03/02/2020    EGD BIOPSY performed by Alyssa Gutierrez MD at [address] [place] [address]  01/01/1983    and 1989       Social History     Socioeconomic History    Marital status: Single     Spouse name: Not on file    Number of children: 2    Years of education: 12    Highest education level: High school graduate   Occupational History    Occupation: FACTORY IN PAST     Comment: 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 None since 2018    Sexual activity: Yes   Other Topics Concern    Not on file   Social History Narrative    Exercise - too light headed. 3/26/19. None. 1/15/20. Walks every am about 1.5 mi 7 days/wk. 20 2.3 mi every day. 20. Walks 2 mi/day. 7/15/20. Was having anxiety attacks while walking his dog so stopped for 2 months. 10/6/20. Walking 1.5 mi 7 day/wk. 21. 1.5-1.75 mi/day x7 day/wk. 21.      Social Determinants of Health     Financial Resource Strain: Unknown    Difficulty of Paying Living Expenses: Patient refused   Food Insecurity: Unknown    Worried About Running Out of Food in the Last Year: Patient refused   [address] in the Last Year: Patient refused   Transportation Needs: Unknown    Lack of Transportation (Medical): Patient refused    Lack of Transportation (Non-Medical): Patient refused   Physical Activity:     Days of Exercise per Week: Not on file   [employer] of Exercise per Session: Not on file   Stress:     Feeling of Stress : Not on file   Social Connections:     Frequency of Communication with Friends and Family: Not on file    Frequency of Social Gatherings with Friends and Family: Not on file    Attends Mosque Services: Not on file    Active Member of Clubs or Organizations: Not on file    Attends Club or Organization Meetings: Not on file    Marital Status: Not on file   Intimate Partner Violence:     Fear of Current or Ex-Partner: Not on file    Emotionally Abused: Not on file    Physically Abused: Not on file    Sexually Abused: Not on file Housing Stability:     Unable to Pay for Housing in the Last Year: Not on file    Number of Places Lived in the Last Year: Not on file    Unstable Housing in the Last Year: Not on file       Family History   Problem Relation Age of Onset    Other Mother         gall bladder    Allergies Mother     Heart Attack Father 52        x3-4    Coronary Art Dis Father     Alcohol Abuse Father     Allergies Father     Other Brother         congenital calcium deposits n bones, COVID-19@ 48.  Allergies Brother     Hearing Loss Brother         congenital    Allergy (Severe) Brother     No Known Problems Brother         in a wreck as teen    Cancer Maternal Grandmother     Diabetes type 2  Maternal Grandmother     Cancer Maternal Grandfather     Asthma Paternal Grandfather     Cancer Maternal Aunt         brain    Other Maternal Aunt         lung issues    Lung Cancer Paternal Aunt     Lung Cancer Paternal Aunt        No Known Allergies    Current Outpatient Medications   Medication Sig Dispense Refill    metoprolol tartrate (LOPRESSOR) 25 MG tablet Take 1 tablet by mouth twice daily 180 tablet 1    clonazePAM (KLONOPIN) 0.5 MG tablet Take 1 tablet by mouth daily for 90 days.  30 tablet 2    pantoprazole (PROTONIX) 40 MG tablet Take 1 tablet by mouth twice daily 180 tablet 0    Ginkgo Biloba 60 MG CAPS Take 1 capsule by mouth daily      lisinopril (PRINIVIL;ZESTRIL) 20 MG tablet Take 1/2 (one-half) tablet by mouth once daily 45 tablet 1    Omega-3 Fatty Acids (OMEGA 3 PO) Take 1 capsule by mouth every morning (before breakfast) Indications: Decreased HDL Cholesterol      albuterol sulfate HFA (PROVENTIL HFA) 108 (90 Base) MCG/ACT inhaler Inhale 2 puffs into the lungs every 4 hours as needed for Wheezing or Shortness of Breath Please provide pt c spacer as well 1 Inhaler 5    budesonide-formoterol (SYMBICORT) 160-4.5 MCG/ACT AERO Inhale 2 puffs into the lungs 2 times daily 3 Inhaler 5    Zinc Acetate-Sweetleaf (FP ZINC LOZENGES PO) Take 0.5 lozenges by mouth 2 times daily       azelastine (ASTELIN) 0.1 % nasal spray 1 spray by Nasal route 2 times daily Use in each nostril as directed 1 Bottle 0    montelukast (SINGULAIR) 10 MG tablet Take 1 tablet by mouth nightly 30 tablet 1    vitamin C (ASCORBIC ACID) 500 MG tablet Take 500 mg by mouth 2 times daily      Cholecalciferol (VITAMIN D3) 50 MCG (2000 UT) CAPS Take 1 capsule by mouth daily       Multiple Vitamins-Minerals (THERAPEUTIC MULTIVITAMIN-MINERALS) tablet Take 1 tablet by mouth daily       No current facility-administered medications for this visit. Objective   Physical Exam  Vitals and nursing note reviewed. Neurological:      Comments: If goes left the numbness and tingling goes away if he goes to the right then right arm numbness and tingling goes away. Gets better on both sides which into the chest.  Relieves when sidestepping to the left on the left. Seen on the right. Was        Patient-Reported Vitals 1/5/2022   Patient-Reported Weight 218LBS   Patient-Reported Height 6'      BP Readings from Last 3 Encounters:   12/07/21 116/82   08/24/21 120/80   06/05/21 125/85     Pulse Readings from Last 3 Encounters:   12/07/21 84   08/24/21 82   06/05/21 108     Wt Readings from Last 3 Encounters:   12/07/21 220 lb (99.8 kg)   08/24/21 218 lb 3.2 oz (99 kg)   06/05/21 227 lb 15.3 oz (103.4 kg)        Colt Chapman is a 49 y.o. male evaluated via telephone on 1/5/2022. Consent:  He and/or health care decision maker is aware that that he may receive a bill for this telephone service, depending on his insurance coverage, and has provided verbal consent to proceed: Yes    Documentation:  I communicated with the patient and/or health care decision maker about issues above. Details of this discussion including any medical advice provided: Information above.     I affirm this is a Patient Initiated Episode with a Patient who has not had a related appointment within my department in the past 7 days or scheduled within the next 24 hours. Patient identification was verified at the start of the visit: Yes    Total Time: 33 minutes    The visit was conducted pursuant to the emergency declaration under the [facility], [address] [place] [address] authority and the [place] and [employer] General Act. Patient identification was verified, and a caregiver was present when appropriate. The patient was located in a state where the provider was credentialed to provide care. Note: not billable if this call serves to triage the patient into an appointment for the relevant concern     An electronic signature was used to authenticate this note.     --Ricky Hernández, DO

## 2022-01-05 NOTE — PATIENT INSTRUCTIONS
Yung Hearn was seen today for other. Diagnoses and all orders for this visit:    Chest pressure  -     XR CHEST (2 VW); Future  Could be asthma could be atypical COVID-19. Will confirm there is no pneumonia. Bacterial pneumonia and bacterial bronchitis are are treatable with antibiotics but viral pneumonia and viral bronchitis are not. If it appears to have pneumonia or bronchitis we want you to follow the regimen below and if it appears to be bacterial we will add an antibiotic. Moderate asthma with acute exacerbation, unspecified whether persistent  This could be the cause of your chest symptoms. Try using your inhaler routinely 3-4 times a day and see if this improves. Cervical radiculopathy  Start a B complex vitamin daily in the morning. It tends to be energizing so best not to take it at bedtime. If this is not improved the numbness and tingling together with the exercise we gave you then will want to try a Medrol Dosepak (low-dose steroid treatment). If the problem is getting worse we may want to go to a higher dose of steroids once we know her lungs are clear. If the problem is asthma the steroids the low-dose or high dose will help some. If you have an infection steroids can interfere with getting better especially if you are not on treatment for that infection. If it is radiculopathy (a pinched nerve in the neck) it does not respond to steroids we will need to refer you to a neurosurgeon for your neck. Sometimes a do epidural steroids for these problems as a first-line treatment although it is what they would do for sure. Start the exercises below as they seem to help. Cerebellar tonsillar ectopia (HCC)  Information on the subject below.

## 2022-01-06 ENCOUNTER — HOSPITAL ENCOUNTER (OUTPATIENT)
Age: 52
Discharge: HOME | End: 2022-01-06
Payer: MEDICARE

## 2022-01-06 ENCOUNTER — HOSPITAL ENCOUNTER (OUTPATIENT)
Dept: GENERAL RADIOLOGY | Age: 52
Discharge: HOME | End: 2022-01-06
Payer: MEDICARE

## 2022-01-06 DIAGNOSIS — R07.89 CHEST PRESSURE: ICD-10-CM

## 2022-01-06 PROCEDURE — 71046 X-RAY EXAM CHEST 2 VIEWS: CPT

## 2022-02-28 DIAGNOSIS — I10 ESSENTIAL HYPERTENSION: ICD-10-CM

## 2022-02-28 DIAGNOSIS — K22.10 ULCER OF ESOPHAGUS WITHOUT BLEEDING: ICD-10-CM

## 2022-02-28 RX ORDER — LISINOPRIL 20 MG/1
TABLET ORAL
Qty: 45 TABLET | Refills: 0 | Status: SHIPPED | OUTPATIENT
Start: 2022-02-28 | End: 2022-06-01

## 2022-02-28 RX ORDER — PANTOPRAZOLE SODIUM 40 MG/1
TABLET, DELAYED RELEASE ORAL
Qty: 180 TABLET | Refills: 0 | Status: SHIPPED | OUTPATIENT
Start: 2022-02-28 | End: 2022-06-01

## 2022-03-08 ENCOUNTER — OFFICE VISIT (OUTPATIENT)
Dept: FAMILY MEDICINE CLINIC | Age: 52
End: 2022-03-08
Payer: MEDICARE

## 2022-03-08 VITALS
HEART RATE: 62 BPM | SYSTOLIC BLOOD PRESSURE: 122 MMHG | BODY MASS INDEX: 28.99 KG/M2 | RESPIRATION RATE: 16 BRPM | DIASTOLIC BLOOD PRESSURE: 80 MMHG | WEIGHT: 214 LBS | HEIGHT: 72 IN | OXYGEN SATURATION: 98 %

## 2022-03-08 DIAGNOSIS — D72.829 LEUKOCYTOSIS, UNSPECIFIED TYPE: ICD-10-CM

## 2022-03-08 DIAGNOSIS — E55.9 VITAMIN D DEFICIENCY: ICD-10-CM

## 2022-03-08 DIAGNOSIS — K22.10 ULCER OF ESOPHAGUS WITHOUT BLEEDING: ICD-10-CM

## 2022-03-08 DIAGNOSIS — D50.0 IRON DEFICIENCY ANEMIA DUE TO CHRONIC BLOOD LOSS: ICD-10-CM

## 2022-03-08 DIAGNOSIS — I10 ESSENTIAL HYPERTENSION: Primary | ICD-10-CM

## 2022-03-08 DIAGNOSIS — E78.00 HYPERCHOLESTEROLEMIA: ICD-10-CM

## 2022-03-08 DIAGNOSIS — J45.901 MODERATE ASTHMA WITH ACUTE EXACERBATION, UNSPECIFIED WHETHER PERSISTENT: ICD-10-CM

## 2022-03-08 DIAGNOSIS — F41.9 ANXIETY: ICD-10-CM

## 2022-03-08 LAB
A/G RATIO: 1.9 (ref 1.1–2.2)
ALBUMIN SERPL-MCNC: 4.8 G/DL (ref 3.4–5)
ALP BLD-CCNC: 94 U/L (ref 40–129)
ALT SERPL-CCNC: 17 U/L (ref 10–40)
ANION GAP SERPL CALCULATED.3IONS-SCNC: 12 MMOL/L (ref 3–16)
AST SERPL-CCNC: 15 U/L (ref 15–37)
BASOPHILS ABSOLUTE: 0 K/UL (ref 0–0.2)
BASOPHILS RELATIVE PERCENT: 0.2 %
BILIRUB SERPL-MCNC: 0.6 MG/DL (ref 0–1)
BUN BLDV-MCNC: 18 MG/DL (ref 7–20)
CALCIUM SERPL-MCNC: 10 MG/DL (ref 8.3–10.6)
CHLORIDE BLD-SCNC: 102 MMOL/L (ref 99–110)
CHOLESTEROL, TOTAL: 162 MG/DL (ref 0–199)
CO2: 24 MMOL/L (ref 21–32)
CREAT SERPL-MCNC: 1 MG/DL (ref 0.9–1.3)
EOSINOPHILS ABSOLUTE: 0.1 K/UL (ref 0–0.6)
EOSINOPHILS RELATIVE PERCENT: 1.6 %
GFR AFRICAN AMERICAN: >60
GFR NON-AFRICAN AMERICAN: >60
GLUCOSE BLD-MCNC: 97 MG/DL (ref 70–99)
HCT VFR BLD CALC: 47.1 % (ref 40.5–52.5)
HDLC SERPL-MCNC: 36 MG/DL (ref 40–60)
HEMOGLOBIN: 15.7 G/DL (ref 13.5–17.5)
HOMOCYSTEINE: 8 UMOL/L (ref 0–10)
LDL CHOLESTEROL CALCULATED: 108 MG/DL
LYMPHOCYTES ABSOLUTE: 1.8 K/UL (ref 1–5.1)
LYMPHOCYTES RELATIVE PERCENT: 20.3 %
MCH RBC QN AUTO: 29.2 PG (ref 26–34)
MCHC RBC AUTO-ENTMCNC: 33.4 G/DL (ref 31–36)
MCV RBC AUTO: 87.6 FL (ref 80–100)
MONOCYTES ABSOLUTE: 0.6 K/UL (ref 0–1.3)
MONOCYTES RELATIVE PERCENT: 6.7 %
NEUTROPHILS ABSOLUTE: 6.3 K/UL (ref 1.7–7.7)
NEUTROPHILS RELATIVE PERCENT: 71.2 %
PDW BLD-RTO: 13.7 % (ref 12.4–15.4)
PLATELET # BLD: 365 K/UL (ref 135–450)
PMV BLD AUTO: 8.4 FL (ref 5–10.5)
POTASSIUM SERPL-SCNC: 5.2 MMOL/L (ref 3.5–5.1)
RBC # BLD: 5.37 M/UL (ref 4.2–5.9)
SODIUM BLD-SCNC: 138 MMOL/L (ref 136–145)
TOTAL PROTEIN: 7.3 G/DL (ref 6.4–8.2)
TRIGL SERPL-MCNC: 89 MG/DL (ref 0–150)
VITAMIN D 25-HYDROXY: 53 NG/ML
VLDLC SERPL CALC-MCNC: 18 MG/DL
WBC # BLD: 8.8 K/UL (ref 4–11)

## 2022-03-08 PROCEDURE — 36415 COLL VENOUS BLD VENIPUNCTURE: CPT | Performed by: FAMILY MEDICINE

## 2022-03-08 PROCEDURE — 1036F TOBACCO NON-USER: CPT | Performed by: FAMILY MEDICINE

## 2022-03-08 PROCEDURE — 99214 OFFICE O/P EST MOD 30 MIN: CPT | Performed by: FAMILY MEDICINE

## 2022-03-08 PROCEDURE — G8427 DOCREV CUR MEDS BY ELIG CLIN: HCPCS | Performed by: FAMILY MEDICINE

## 2022-03-08 PROCEDURE — G8417 CALC BMI ABV UP PARAM F/U: HCPCS | Performed by: FAMILY MEDICINE

## 2022-03-08 PROCEDURE — 3017F COLORECTAL CA SCREEN DOC REV: CPT | Performed by: FAMILY MEDICINE

## 2022-03-08 PROCEDURE — G8484 FLU IMMUNIZE NO ADMIN: HCPCS | Performed by: FAMILY MEDICINE

## 2022-03-08 RX ORDER — CLONAZEPAM 0.5 MG/1
0.5 TABLET ORAL DAILY
Qty: 30 TABLET | Refills: 2 | Status: SHIPPED | OUTPATIENT
Start: 2022-03-30 | End: 2022-10-17 | Stop reason: SDUPTHER

## 2022-03-08 ASSESSMENT — PATIENT HEALTH QUESTIONNAIRE - PHQ9
SUM OF ALL RESPONSES TO PHQ9 QUESTIONS 1 & 2: 0
SUM OF ALL RESPONSES TO PHQ QUESTIONS 1-9: 0
SUM OF ALL RESPONSES TO PHQ QUESTIONS 1-9: 0
1. LITTLE INTEREST OR PLEASURE IN DOING THINGS: 0
2. FEELING DOWN, DEPRESSED OR HOPELESS: 0
SUM OF ALL RESPONSES TO PHQ QUESTIONS 1-9: 0
SUM OF ALL RESPONSES TO PHQ QUESTIONS 1-9: 0

## 2022-03-08 NOTE — PROGRESS NOTES
Waldo Braun (:  1970) is a 49 y.o. male,Established patient, here for evaluation of the following chief complaint(s):  Hypertension (3 MO HTN ROUTINE FOLLOW UP), Hyperlipidemia (3 MO CHOLESTEROL ROUTINE FOLLOW UP, FASTING LABS DUE), Anxiety (3 MO ANXIETY ROUTINE FOLLOW UP), Gastroesophageal Reflux (3 MO GERD ROUTINE FOLLOW UP), and Other (DEPRESSION SCREEN DUE)        ASSESSMENT/PLAN:  851    Patient Instructions   Yanylorena Garcia was seen today for hypertension, hyperlipidemia, anxiety, gastroesophageal reflux and other. Diagnoses and all orders for this visit:    Essential hypertension  -     Good control.  -     Continue meds and lifestyle control. Hypercholesterolemia  Recheck today. Moderate asthma with acute exacerbation, unspecified whether persistent  Stable. Anxiety  -     clonazePAM (KLONOPIN) 0.5 MG tablet; Take 1 tablet by mouth daily for 90 days.  -     Good control.  -     Continue meds and lifestyle control. Iron deficiency anemia due to chronic blood loss  -     Good control at last check. Vitamin D deficiency  Check today. Labs today. Return in about 3 months (around 2022) for Asthma. Subjective   SUBJECTIVE/OBJECTIVE:  Chief Complaint   Patient presents with    Hypertension     3 MO HTN ROUTINE FOLLOW UP    Hyperlipidemia     3 MO CHOLESTEROL ROUTINE FOLLOW UP, FASTING LABS DUE    Anxiety     3 MO ANXIETY ROUTINE FOLLOW UP    Gastroesophageal Reflux     3 MO GERD ROUTINE FOLLOW UP    Other     DEPRESSION SCREEN DUE   26  HPI    Essential hypertension  Has a cuff but doesn't read right. Has not brought in to repair. Is not adding salt. No salty snacks. Occasional fast food. Hypercholesterolemia   1.5-1.75 mi/day x7 day/wk. 21. Also Does 90 min 7 days core body. Is watching diet for cholesterol since last year. More fish and chicken and salads still red meat 2 days/wk. Eats egg whites.   Moderate asthma with acute exacerbation, unspecified whether persistent  Has been stable since Jan problem. Xray was normal.  Anxiety  -     clonazePAM (KLONOPIN) 0.5 MG tablet; Take 1 tablet by mouth daily for 90 days. Usually med qhs but occasional during the day with anxiety. Iron deficiency anemia due to chronic blood loss  No iron. Eats spinach. Vitamin D deficiency  2000 IU       Review of Systems   Past Medical History:   Diagnosis Date    Anxiety     Asthma 01/01/2000    late 20's-early 30s.  Closed fracture of head of fifth metacarpal     Boxer's fracture    Former smoker 05/2019    started age 17, quit age 53    Fracture of finger of right hand     And knuckle    History of blood transfusion     new this admission 6/10/19    Hypertension     Iron deficiency anemia due to chronic blood loss 10/31/2019    DUE TO GI BLEED/ULCERS    Lumbar disc disease 01/01/1994    L4-5 proteuding disc    Peptic ulcer 01/01/2015    EGD 3/2/2020: Distal circumferentialesophageal ulcer and distal Parra's. Gastric body and antrum gastritis       Past Surgical History:   Procedure Laterality Date    COLONOSCOPY  03/2015    1 benign polyp. [address] Due [id number].     ENDOSCOPY, COLON, DIAGNOSTIC      LUMBAR SPINE SURGERY  01/01/2002    epidural steroids x3    NASAL ENDOSCOPY  03/11/2020    UPPER GASTROINTESTINAL ENDOSCOPY  3/2015    3 ulcers - peptic and esophageal    UPPER GASTROINTESTINAL ENDOSCOPY  06/2019    +ulcers    UPPER GASTROINTESTINAL ENDOSCOPY N/A 06/11/2019    EGD DIAGNOSTIC ONLY performed by Neelima Acevedo MD at [address] [facility] [facility]"  08/19/2019    Dr. Yosvany Miller, ulcer healing in esophagitis, Parra's 1    UPPER GASTROINTESTINAL ENDOSCOPY N/A 01/06/2020    EGD CONTROL HEMORRHAGE performed by Flaca Hudson MD at [address] [facility] 01/06/2020    EGD SUBMUCOSAL/ INJECTION OF EPINEPHRINE performed by Flaca Hudson MD at [address] [place] [address] GASTROINTESTINAL ENDOSCOPY N/A 2020    EGD BIOPSY performed by Nitish Figueroa MD at [address] EXTRACTION  1983    and        Social History     Socioeconomic History    Marital status: Single     Spouse name: Not on file    Number of children: 2    Years of education: 15    Highest education level: High school graduate   Occupational History    Occupation: FACTORY IN PAST     Comment: unemployed 2018   Tobacco Use    Smoking status: Former Smoker     Packs/day: 1.50     Years: 35.00     Pack years: 52.50     Types: Cigarettes     Start date: 1988     Quit date: 2019     Years since quittin.8    Smokeless tobacco: Former User     Types: Snuff    Tobacco comment: started to smoke at 16 / smoked up to 1.5 ppd / smoked for 35 yrs on and off / 1 PPD (QUIT [id number])   Vaping Use    Vaping Use: Never used   Substance and Sexual Activity    Alcohol use: No     Comment: rare alcohol    Drug use: Not Currently     Types: Marijuana Westly Eliazar)     Comment: was daily. None since 2018    Sexual activity: Yes   Other Topics Concern    Not on file   Social History Narrative    Exercise - too light headed. 3/26/19. None. 1/15/20. Walks every am about 1.5 mi 7 days/wk. 20 2.3 mi every day. 20. Walks 2 mi/day. 7/15/20. Was having anxiety attacks while walking his dog so stopped for 2 months. 10/6/20. Walking 1.5 mi 7 day/wk. 21. 1.5-1.75 mi/day x7 day/wk. 21. Also Does 90 min 7 days core body. 3/8/22.       Social Determinants of Health     Financial Resource Strain: Unknown    Difficulty of Paying Living Expenses: Patient refused   Food Insecurity: Unknown    Worried About Running Out of Food in the Last Year: Patient refused    [address] in the Last Year: Patient refused   Transportation Needs: Unknown    Lack of Transportation (Medical): Patient refused    Lack of Transportation (Non-Medical): Patient refused   Physical Activity:     Days  lisinopril (PRINIVIL;ZESTRIL) 20 MG tablet Take 1/2 (one-half) tablet by mouth once daily 45 tablet 0    metoprolol tartrate (LOPRESSOR) 25 MG tablet Take 1 tablet by mouth twice daily 180 tablet 1    Ginkgo Biloba 60 MG CAPS Take 1 capsule by mouth daily      Omega-3 Fatty Acids (OMEGA 3 PO) Take 1 capsule by mouth every morning (before breakfast) Indications: Decreased HDL Cholesterol      albuterol sulfate HFA (PROVENTIL HFA) 108 (90 Base) MCG/ACT inhaler Inhale 2 puffs into the lungs every 4 hours as needed for Wheezing or Shortness of Breath Please provide pt c spacer as well 1 Inhaler 5    budesonide-formoterol (SYMBICORT) 160-4.5 MCG/ACT AERO Inhale 2 puffs into the lungs 2 times daily 3 Inhaler 5    Zinc Acetate-Sweetleaf (FP ZINC LOZENGES PO) Take 0.5 lozenges by mouth 2 times daily       azelastine (ASTELIN) 0.1 % nasal spray 1 spray by Nasal route 2 times daily Use in each nostril as directed 1 Bottle 0    montelukast (SINGULAIR) 10 MG tablet Take 1 tablet by mouth nightly 30 tablet 1    vitamin C (ASCORBIC ACID) 500 MG tablet Take 500 mg by mouth 2 times daily      Cholecalciferol (VITAMIN D3) 50 MCG (2000 UT) CAPS Take 1 capsule by mouth daily       Multiple Vitamins-Minerals (THERAPEUTIC MULTIVITAMIN-MINERALS) tablet Take 1 tablet by mouth daily       No current facility-administered medications for this visit. Objective   Physical Exam  Vitals and nursing note reviewed. Constitutional:       General: He is not in acute distress. Appearance: He is well-developed. He is not diaphoretic. Eyes:      General: No scleral icterus. Right eye: No discharge. Left eye: No discharge. Conjunctiva/sclera: Conjunctivae normal.   Neck:      Thyroid: No thyroid mass or thyromegaly. Vascular: No carotid bruit or JVD. Trachea: Trachea and phonation normal. No tracheal tenderness or tracheal deviation.    Cardiovascular:      Rate and Rhythm: Normal rate and regular rhythm. Heart sounds: Normal heart sounds, S1 normal and S2 normal. Heart sounds not distant. No murmur heard. No friction rub. No gallop. No S3 or S4 sounds. Pulmonary:      Effort: Pulmonary effort is normal. No respiratory distress. Breath sounds: Normal breath sounds. No stridor. No decreased breath sounds, wheezing, rhonchi or rales. Abdominal:      General: There is no distension. Tenderness: There is no abdominal tenderness (no epigastric tenderness. ). Musculoskeletal:      Cervical back: Neck supple. Lymphadenopathy:      Head:      Right side of head: No submandibular or tonsillar adenopathy. Left side of head: No submandibular or tonsillar adenopathy. Cervical: No cervical adenopathy. Right cervical: No superficial, deep or posterior cervical adenopathy. Left cervical: No superficial, deep or posterior cervical adenopathy. Skin:     General: Skin is warm and dry. Coloration: Skin is not pale. Neurological:      Mental Status: He is alert. Deep Tendon Reflexes:      Reflex Scores:       Patellar reflexes are 2+ on the right side and 2+ on the left side. Psychiatric:         Attention and Perception: Attention and perception normal.         Mood and Affect: Mood and affect normal.         Speech: Speech normal.         Behavior: Behavior normal. Behavior is cooperative.          Cognition and Memory: Cognition and memory normal.         Judgment: Judgment normal.         Vitals:    03/08/22 0807   BP: 122/80   Site: Right Upper Arm   Position: Sitting   Cuff Size: Medium Adult   Pulse: 62   Resp: 16   SpO2: 98%   Weight: 214 lb (97.1 kg)   Height: 6' (1.829 m)     BP Readings from Last 3 Encounters:   03/08/22 122/80   12/07/21 116/82   08/24/21 120/80     Pulse Readings from Last 3 Encounters:   03/08/22 62   12/07/21 84   08/24/21 82     Wt Readings from Last 3 Encounters:   03/08/22 214 lb (97.1 kg)   12/07/21 220 lb (99.8 kg)   08/24/21 218 lb 3.2 oz (99 kg)     Body mass index is 29.02 kg/m². TWO XRAY VIEWS OF THE CHEST  1/6/2022 8:14 am  COMPARISON:  06/05/2021  HISTORY:  ORDERING SYSTEM PROVIDED HISTORY: Chest pressure  TECHNOLOGIST PROVIDED HISTORY:  Reason for exam:->chest pressure and hx of asthma  Reason for Exam: chest pressure, hx of asthma     FINDINGS:  The lungs are clear. The lungs are mildly hyperexpanded. Calcified  granulomatous disease is noted. The cardiac silhouette is within normal  limits. There is no pneumothorax or pleural effusion.     IMPRESSION:  1. No acute abnormality. On this date 3/8/2022 I have spent 36 minutes reviewing previous notes, test results and face to face with the patient discussing the diagnosis and importance of compliance with the treatment plan as well as documenting on the day of the visit. An electronic signature was used to authenticate this note.     --Sonja Huynh, DO

## 2022-03-08 NOTE — PATIENT INSTRUCTIONS
Eliseo Gayla was seen today for hypertension, hyperlipidemia, anxiety, gastroesophageal reflux and other. Diagnoses and all orders for this visit:    Essential hypertension  -     Good control.  -     Continue meds and lifestyle control. Hypercholesterolemia  Recheck today. Moderate asthma with acute exacerbation, unspecified whether persistent  Stable. Anxiety  -     clonazePAM (KLONOPIN) 0.5 MG tablet; Take 1 tablet by mouth daily for 90 days.  -     Good control.  -     Continue meds and lifestyle control. Iron deficiency anemia due to chronic blood loss  -     Good control at last check. Vitamin D deficiency  Check today. Labs today.

## 2022-04-01 ENCOUNTER — PATIENT MESSAGE (OUTPATIENT)
Dept: FAMILY MEDICINE CLINIC | Age: 52
End: 2022-04-01

## 2022-04-01 NOTE — TELEPHONE ENCOUNTER
From: Latha Dorsey  To: Dr. Junior Mcmullen  Sent: 4/1/2022 6:50 AM EDT  Subject: Sinus Infection? For 2 weeks now, I have been experiencing itchy watery eyes, dizziness, pressure around the eyes. I can feel mucus build up in my sinuses but Im unable to relieve the pressure. I believe I have a sinus infection. I am reaching out to you, hoping that you might be able to prescribe something?     Thank you  Layo Metzger

## 2022-04-02 ENCOUNTER — E-VISIT (OUTPATIENT)
Dept: FAMILY MEDICINE CLINIC | Age: 52
End: 2022-04-02
Payer: MEDICARE

## 2022-04-02 DIAGNOSIS — J30.1 NON-SEASONAL ALLERGIC RHINITIS DUE TO POLLEN: Primary | ICD-10-CM

## 2022-04-02 DIAGNOSIS — J30.9 ALLERGIC SINUSITIS: ICD-10-CM

## 2022-04-02 DIAGNOSIS — B96.89 ACUTE BACTERIAL SINUSITIS: ICD-10-CM

## 2022-04-02 DIAGNOSIS — J01.90 ACUTE BACTERIAL SINUSITIS: ICD-10-CM

## 2022-04-02 PROCEDURE — 99423 OL DIG E/M SVC 21+ MIN: CPT | Performed by: FAMILY MEDICINE

## 2022-04-02 RX ORDER — AZELASTINE 1 MG/ML
1 SPRAY, METERED NASAL 2 TIMES DAILY
Qty: 30 ML | Refills: 5 | Status: SHIPPED | OUTPATIENT
Start: 2022-04-02

## 2022-04-02 RX ORDER — SULFAMETHOXAZOLE AND TRIMETHOPRIM 800; 160 MG/1; MG/1
1 TABLET ORAL 2 TIMES DAILY
Qty: 20 TABLET | Refills: 0 | Status: SHIPPED | OUTPATIENT
Start: 2022-04-02 | End: 2022-04-12

## 2022-04-02 RX ORDER — FLUTICASONE PROPIONATE 50 MCG
2 SPRAY, SUSPENSION (ML) NASAL DAILY
Qty: 48 G | Refills: 1 | Status: SHIPPED | OUTPATIENT
Start: 2022-04-02

## 2022-04-02 RX ORDER — MONTELUKAST SODIUM 10 MG/1
10 TABLET ORAL NIGHTLY
Qty: 30 TABLET | Refills: 11 | Status: SHIPPED | OUTPATIENT
Start: 2022-04-02

## 2022-04-02 ASSESSMENT — LIFESTYLE VARIABLES
SMOKING_STATUS: NO, I'M A FORMER SMOKER
PACKS_PER_DAY: 1
SMOKING_YEARS: 30

## 2022-04-02 NOTE — PROGRESS NOTES
Symptoms summary  Nasal congestion. Clear nasal discharge. Pain or pressure on your nose face or upper teeth. Sneezing. Itchy dry and runny eyes. Symptoms 16 to 17 days. No fevers. Symptoms worse with pollen dust mold pets or other environmental factors. Stable symptoms in the past used Z-Ady antibiotics. OTC allergy mucus relief not working. With minimal relief.

## 2022-04-02 NOTE — PATIENT INSTRUCTIONS
Diagnoses and all orders for this visit:    Non-seasonal allergic rhinitis due to pollen/ Allergic sinusitis  -     azelastine (ASTELIN) 0.1 % nasal spray; 1 spray by Nasal route 2 times daily Use in each nostril as directed  -     montelukast (SINGULAIR) 10 MG tablet; Take 1 tablet by mouth nightly  -     fluticasone (FLONASE) 50 MCG/ACT nasal spray; 2 sprays by Each Nostril route daily  Use humidifier with CPAP. Make sure you have adequate humidification in the house. Recommend that you keep your windows closed during the spring summer and fall to avoid pollen sitting in the house. Sometimes exposure to these things early in the season outdoors can help you develop resistance to allergy symptoms but high levels of pollen settling on pillows can make for miserable nights. I noted that you do have vitamin C 500 twice a day and a zinc lozenge twice a day on your med list.  You are also on a multiple vitamin and vitamin D. All of these will help your immune system fight things which I assume you are still taking and may be part of the reason you are able to tolerate symptoms for so long. .  Currently on the chart are treatments that could be effective not mentioned is being used: Singulair 10 mg, Astelin nasal 1 spray twice daily and a history of use of Flonase which is usually 1 puff 1-2 times a day or 2 puffs once a day, in the past you were also on Xyzal 5 mg OR cetirizine 10 mg daily. I have refilled the Singulair, Astelin and Flonase. This is the height of allergy season and often allergies can lead to sinus infections because of retained mucus. I seem to recall you but drink water well which helps avoid this. It will be important to use all effective allergy treatments that work by different mechanisms. However it is questionable whether taking Xyzal OR cetirizine will be helpful in the setting of Astelin nasal spray which is also an antihistamine.   Because we do not have a back-and-forth discussion during these visits I have refilled the Singulair, Astelin and Flonase. You can decline to  any that you feel were ineffective in the past.  The last treatment prescription for allergies is a Medrol Dosepak -oral steroids which sometimes help relieve symptoms. However long-term repeated Medrol Dosepak can lead to diabetes and other steroid side effects. If this continues to be a problem you should consider allergy testing and injections at an allergist rather than repeated courses of steroids and using multiple medications every day with repeated courses of antibiotics. Acute bacterial sinusitis  -     sulfamethoxazole-trimethoprim (BACTRIM DS;SEPTRA DS) 800-160 MG per tablet; Take 1 tablet by mouth 2 times daily for 10 days   Take a probiotic midday i.e. at lunch while taking the antibiotic above at breakfast and dinner. This will help prevent diarrhea/loose stools/abdominal cramping. If taken together with an antibiotic at the same meal probiotics (good bacteria) are killed by the antibiotic. Antibiotics should always be the last treatment. Every time you use an antibiotic you develop resistant germs to that antibiotic. It is not a good idea to use the same antibiotic repeatedly. I would recommend that we use Bactrim DS twice daily for 10 days. Is very important to use the full regimen. The shorter course of a Z-Ady although it would last in your system for 10 days has been shown to be less effective in treating sinus infections as opposed to bronchitis. This is thought to be because at the end of 10 days it's concentration is very low. Whenever you treat a bacterial infection strongly recommend the respiratory infection instructions as well. Part of this you have been doing for prevention but would up the regimen again for instance zinc lozenges at least 4 times a day after gargling 4 times a day with mouthwash after meals and bedtime.   Note the other differences from your current regimen also.    IF you develop ANY respiratory infection symptoms (not just allergy symptoms) suggestive of COVID-19 start the recommendations below: Instructions for Respiratory Infections (SAVE THIS SHEET)    For the first 7-14 days of symptoms follow instructions below, even before being seen in the office or even during treatment with antibiotics, until symptom free. 1. Water: Drink 1 ounce of water for every 2 pounds of body weight for adults, you need 96 ounces (3 quarts or 3 L) of water/fluids per day. This will loosen mucus in the head and chest & improve the weak feeling of dehydration, allow the body to get germ fighting resources to the infection. Half of fluids can be juice or sugar free Crystal Light. Don't count drinks with caffeine, alcohol or carbonation. Infants can have Pedialyte liquid or freezer pops. Avoid salt and sports drinks if you have high Blood Pressure, swelling in the feet or ankles or have heart problems. 2. Humidity: Humidify the air to 35-50% ( or until the windows fog over slightly). Can use a humidifier, vaporizer, boil water on the stove or put a coffee can full of water on the heater vents. This will loosen mucus from infections and allergies. 3. Sleep: Get 8-10 hours a night and rest during the evening after work or school. If you have trouble sleeping, adults can take Melatonin 5mg up to 2 tabs at bedtime ( not for children or pregnant women). If Mono is suspected then sleep during 9PM to 9AM time span (if possible.)  4. Cough: Take cough medicines with Guaifenesin ( to loosen chest or head congestion) and Dextromethorphan ( to decrease excess cough). Robitussin D.M. Syrup every 4-6 hrs OR Mucinex D. M. pills OR Delsym DM syrup twice a day. Use the pediatric formulations for children over 6 months making sure they are alcohol & sugar free for children, pregnant women, and diabetics. 5. Pain And Fevers:  Take Acetaminophen ( Tylenol) for fevers, aches, and headaches. 2-500 mg every 8 hours for adults. Appropriate doses at bedtime for children may help them sleep better. If pregnant take 1 -500 mg (Tylenol) every 8 hours as needed. Ibuprofen/Aleve/aspirin for pain and fevers SHOULD NOT BE USED IN THE SETTING OF POSSIBLE COVID-19 viral infection NOR if pregnant, if you have acid reflux, high blood pressure, CHF, or kidney problems. 6.Gargle: (DAY ONE OF SYMPTOMS) Gargle in the back of the throat with the head tilted back and to the sides with a strong mouthwash  ( Listerine or Scope) after meals and at bedtime at least 4 -5 times a day. This helps kill bacteria and viruses in the back of the throat and will shorten the duration and decrease the severity of your symptoms: sore throat, cough, ear popping,/ear pain, and possibly dizziness. 7. Smoking: Avoid smoking or exposure to second hand smoke. 8. Zinc: (DAY ONE OF SYMPTOMS)  Zinc lozenges such as Cold Oskar (available most stores), or Basic ([employer] brand) will help shorten the duration and lessen symptoms such as sore throat, cough, nasal congestion, runny nose, and post nasal drip. Use 1 lozenge every 2-4 hours ( after meals if stomach is sensitive). Children can use 10-15 mg or less 3-4 times a day or Zinc lollypops. In pregnancy limit to 50-60 mg a day for 7 days as prenatals have Zinc also. With diarrhea use zinc pills 50 mg 1/2 to 1 pill 2x/day WITH OR SHORTLY AFTER A MEAL. 9. Vitamins: Vitamin C 500 mg with breakfast and dinner (with suspected or diagnosed COVID-19 infection take vitamin C 1000 mg 2-3 times a day). Children and pregnant women should drink citrus juices. This speeds healing and strengthens immune system. 10. Chest Symptoms: Vicks Vapor rub to the chest at bedtime. 11. Decongestants: Avoid all decongestants and antihistamine cold preparations in children.   Decongestants should always be avoided in people with high blood pressure, palpitations, heart disease and those on stimulant medications. Antihistamines may impede the body's ability to fight COVID-19.  12. Frequent hand washing and/or hand gel especially after coughing or sneezing into the hands or blowing the nose to help prevent spreading to others. Use kleenex and NOT handkerchiefs. Try all of the above starting with day 1 of symptoms. If Strep throat symptoms appear call to be seen in the office as soon as possible and don't gargle on that day. Newborns, infants, or anyone with earaches or influenza may need to be seen quickly. Adults with fevers over 103 degrees or shortness of breath should call the office immediately.     Call if not improving for a Medrol dose pack and a referral to an allergist.

## 2022-05-11 ENCOUNTER — E-VISIT (OUTPATIENT)
Dept: FAMILY MEDICINE CLINIC | Age: 52
End: 2022-05-11
Payer: MEDICARE

## 2022-05-11 DIAGNOSIS — J01.11 ACUTE RECURRENT FRONTAL SINUSITIS: Primary | ICD-10-CM

## 2022-05-11 PROCEDURE — 99423 OL DIG E/M SVC 21+ MIN: CPT | Performed by: FAMILY MEDICINE

## 2022-05-11 RX ORDER — AZITHROMYCIN 250 MG/1
250 TABLET, FILM COATED ORAL SEE ADMIN INSTRUCTIONS
Qty: 6 TABLET | Refills: 0 | Status: SHIPPED | OUTPATIENT
Start: 2022-05-11 | End: 2022-05-16

## 2022-05-11 ASSESSMENT — LIFESTYLE VARIABLES
SMOKING_STATUS: NO, I'M A FORMER SMOKER
PACKS_PER_DAY: 1
SMOKING_YEARS: 30

## 2022-05-12 NOTE — PATIENT INSTRUCTIONS
Diagnoses and all orders for this visit:    Acute recurrent frontal sinusitis  -     azithromycin (ZITHROMAX) 250 MG tablet; Take 1 tablet by mouth See Admin Instructions for 5 days 500mg on day 1 followed by 250mg on days 2 - 5  As you requested we sent in a Z-Ady. I reviewed your past treatments. It appears that it is longer between sinus infections when you take the Z-Ady. This may not continue to be so. Repeated use of an antibiotic oftentimes causes antibiotic resistant bacteria to develop. If the problem continues we may want to have you see an infectious disease specialist.  Continue the usual recommendations below. IF you develop ANY respiratory infection symptoms (not just allergy symptoms) suggestive of COVID-19 start the recommendations below: Instructions for Respiratory Infections (SAVE THIS SHEET)    For the first 7-14 days of symptoms follow instructions below, even before being seen in the office or even during treatment with antibiotics, until symptom free. 1. Water: Drink 1 ounce of water for every 2 pounds of body weight for adults, 96 ounces of water/fluids per day. This will loosen mucus in the head and chest & improve the weak feeling of dehydration, allow the body to get germ fighting resources to the infection. Half of fluids can be juice or sugar free Crystal Light. Don't count drinks with caffeine, alcohol or carbonation. Infants can have Pedialyte liquid or freezer pops. Avoid salt and sports drinks if you have high Blood Pressure, swelling in the feet or ankles or have heart problems. 2. Humidity: Humidify the air to 35-50% ( or until the windows fog over slightly). Can use a humidifier, vaporizer, boil water on the stove or put a coffee can full of water on the heater vents. This will loosen mucus from infections and allergies. 3. Sleep: Get 8-10 hours a night and rest during the evening after work or school.  If you have trouble sleeping, adults can take Melatonin 5mg up to 2 tabs at bedtime ( not for children or pregnant women). If Mono is suspected then sleep during 9PM to 9AM time span (if possible.)  4. Cough: Take cough medicines with Guaifenesin ( to loosen chest or head congestion) and Dextromethorphan ( to decrease excess cough). Robitussin D.M. Syrup every 4-6 hrs OR Mucinex D. M. pills OR Delsym DM syrup twice a day. Use the pediatric formulations for children over 6 months making sure they are alcohol & sugar free for children, pregnant women, and diabetics. 5. Pain And Fevers: Take Acetaminophen ( Tylenol) for fevers, aches, and headaches. 2-500 mg every 8 hours for adults. Appropriate doses at bedtime for children may help them sleep better. If pregnant take 1 -500 mg (Tylenol) every 8 hours as needed. Ibuprofen/Aleve/aspirin for pain and fevers SHOULD NOT BE USED IN THE SETTING OF POSSIBLE COVID-19 viral infection NOR if pregnant, if you have acid reflux, high blood pressure, CHF, or kidney problems. 6.Gargle: (DAY ONE OF SYMPTOMS) Gargle in the back of the throat with the head tilted back and to the sides with a strong mouthwash  ( Listerine or Scope) after meals and at bedtime at least 4 -5 times a day. This helps kill bacteria and viruses in the back of the throat and will shorten the duration and decrease the severity of your symptoms: sore throat, cough, ear popping,/ear pain, and possibly dizziness. 7. Smoking: Avoid smoking or exposure to second hand smoke. 8. Zinc: (DAY ONE OF SYMPTOMS)  Zinc lozenges such as Cold Oskar (available most stores), or Basic ([employer] brand) will help shorten the duration and lessen symptoms such as sore throat, cough, nasal congestion, runny nose, and post nasal drip. Use 1 lozenge every 2-4 hours ( after meals if stomach is sensitive). Children can use 10-15 mg or less 3-4 times a day or Zinc lollypops. In pregnancy limit to 50-60 mg a day for 7 days as prenatals have Zinc also.   With diarrhea use zinc pills 50 mg 1/2 to 1 pill 2x/day WITH OR SHORTLY AFTER A MEAL. 9. Vitamins: Vitamin C 500 mg with breakfast and dinner (with suspected or diagnosed COVID-19 infection take vitamin C 1000 mg 2-3 times a day). Children and pregnant women should drink citrus juices. This speeds healing and strengthens immune system. 10. Chest Symptoms: Vicks Vapor rub to the chest at bedtime. 11. Decongestants: Avoid all decongestants and antihistamine cold preparations in children. Decongestants should always be avoided in people with high blood pressure, palpitations, heart disease and those on stimulant medications. Antihistamines may impede the body's ability to fight COVID-19.  12. Frequent hand washing and/or hand gel especially after coughing or sneezing into the hands or blowing the nose to help prevent spreading to others. Use kleenex and NOT handkerchiefs. Try all of the above starting with day 1 of symptoms. If Strep throat symptoms appear call to be seen in the office as soon as possible and don't gargle on that day. Newborns, infants, or anyone with earaches or influenza may need to be seen quickly. Adults with fevers over 103 degrees or shortness of breath should call the office immediately. Do not forget COVID-19. It usually does not present as simply as your symptoms has however I have had several people call with \"sinus infection\" only to find after testing that they had COVID-19. Thanks.

## 2022-05-12 NOTE — PROGRESS NOTES
Reviewed the chart looking for what prior antibiotics have worked for his sinus infections. He has had longer periods of wellness after treatment with Zithromax. Parents and siblings all have allergies except 1 brother.

## 2022-05-16 ENCOUNTER — PATIENT MESSAGE (OUTPATIENT)
Dept: FAMILY MEDICINE CLINIC | Age: 52
End: 2022-05-16

## 2022-05-16 ENCOUNTER — E-VISIT (OUTPATIENT)
Dept: FAMILY MEDICINE CLINIC | Age: 52
End: 2022-05-16
Payer: MEDICARE

## 2022-05-16 DIAGNOSIS — J01.11 ACUTE RECURRENT FRONTAL SINUSITIS: Primary | ICD-10-CM

## 2022-05-16 DIAGNOSIS — J30.1 NON-SEASONAL ALLERGIC RHINITIS DUE TO POLLEN: ICD-10-CM

## 2022-05-16 PROCEDURE — 99422 OL DIG E/M SVC 11-20 MIN: CPT | Performed by: FAMILY MEDICINE

## 2022-05-16 RX ORDER — METHYLPREDNISOLONE 4 MG/1
TABLET ORAL
Qty: 1 KIT | Refills: 0 | Status: SHIPPED | OUTPATIENT
Start: 2022-05-16 | End: 2022-05-22

## 2022-05-16 RX ORDER — AMOXICILLIN AND CLAVULANATE POTASSIUM 875; 125 MG/1; MG/1
1 TABLET, FILM COATED ORAL 2 TIMES DAILY
Qty: 14 TABLET | Refills: 0 | Status: SHIPPED | OUTPATIENT
Start: 2022-05-16 | End: 2022-05-23

## 2022-05-16 ASSESSMENT — LIFESTYLE VARIABLES
SMOKING_STATUS: NO, I'M A FORMER SMOKER
SMOKING_YEARS: 30
PACKS_PER_DAY: 1

## 2022-05-17 ENCOUNTER — PATIENT MESSAGE (OUTPATIENT)
Dept: FAMILY MEDICINE CLINIC | Age: 52
End: 2022-05-17

## 2022-05-17 NOTE — TELEPHONE ENCOUNTER
From: Rocky Simmons  To: Dr. Jeramy Bowen: 5/16/2022 6:18 PM EDT  Subject: Issue with my head    Dr. Jahaira Barrientos, my issue with my head is not improving. It has actually started getting worse over the last two days. I have a lot of dizziness and pressure in the forehead area. I have taken everything youve prescribed and no relief. I follow all things you have recommended and no relief. This is extremely frustrating, I am at my wits end. I dont know what else to do.

## 2022-05-17 NOTE — PATIENT INSTRUCTIONS
lead to secondary infections. You had seen Dr. Thai Gomes in the past.  I would really recommend you get back with him or another ENT and consider allergy shots. The shots would help reduce risk of infections. Constantly using antibiotics put you at risk for antibiotic resistant germs like MRSA. If you wish to be referred to an ENT besides Dr. Thai Gomes let me know. You may also need sinus surgery to open up the drainage from the sinuses. Acute recurrent frontal sinusitis  -     amoxicillin-clavulanate (AUGMENTIN) 875-125 MG per tablet; Take 1 tablet by mouth 2 times daily for 7 days  Follow the respiratory infection instructions given in the past.  If not getting better will refer for possible surgery. Non-seasonal allergic rhinitis due to pollen  -     methylPREDNISolone (MEDROL DOSEPACK) 4 MG tablet; Take by mouth. If not improving from allergy standpoint would refer to consider allergy shots. Thanks.

## 2022-05-17 NOTE — TELEPHONE ENCOUNTER
From: Tucker Mancini  To: Dr. Dinorah Garduno: 5/17/2022 5:55 AM EDT  Subject: Breezy Howe message    Dr. Alexis Love, I want to start off by saying if what I sent yesterday came across as angry or accusatory in any way, I apologize. I am just so frustrated. I have worked so hard on my physical and mental health over the last couple of years, that I finally felt I was in a position to finally go back to work. I got a job and have already had to quit because of my head. I believe that you have done an amazing job as my doctor. I appreciate everything you have done for me. I will be contacting Dr. Janice Pereira today. Thank you!

## 2022-06-01 DIAGNOSIS — I10 ESSENTIAL HYPERTENSION: ICD-10-CM

## 2022-06-01 DIAGNOSIS — K22.10 ULCER OF ESOPHAGUS WITHOUT BLEEDING: ICD-10-CM

## 2022-06-01 RX ORDER — PANTOPRAZOLE SODIUM 40 MG/1
TABLET, DELAYED RELEASE ORAL
Qty: 180 TABLET | Refills: 0 | Status: SHIPPED | OUTPATIENT
Start: 2022-06-01 | End: 2022-09-06

## 2022-06-01 RX ORDER — LISINOPRIL 20 MG/1
TABLET ORAL
Qty: 45 TABLET | Refills: 0 | Status: SHIPPED | OUTPATIENT
Start: 2022-06-01 | End: 2022-08-31

## 2022-06-13 ENCOUNTER — OFFICE VISIT (OUTPATIENT)
Dept: ENT CLINIC | Age: 52
End: 2022-06-13
Payer: MEDICARE

## 2022-06-13 VITALS — HEART RATE: 65 BPM | SYSTOLIC BLOOD PRESSURE: 166 MMHG | DIASTOLIC BLOOD PRESSURE: 81 MMHG | TEMPERATURE: 97.1 F

## 2022-06-13 DIAGNOSIS — R44.8 FACIAL PRESSURE: ICD-10-CM

## 2022-06-13 DIAGNOSIS — J32.9 CHRONIC SINUSITIS, UNSPECIFIED LOCATION: Primary | ICD-10-CM

## 2022-06-13 DIAGNOSIS — J34.3 HYPERTROPHY OF BOTH INFERIOR NASAL TURBINATES: ICD-10-CM

## 2022-06-13 DIAGNOSIS — J30.9 ALLERGIC RHINITIS, UNSPECIFIED SEASONALITY, UNSPECIFIED TRIGGER: ICD-10-CM

## 2022-06-13 DIAGNOSIS — R42 DIZZINESS: ICD-10-CM

## 2022-06-13 DIAGNOSIS — J34.2 DNS (DEVIATED NASAL SEPTUM): ICD-10-CM

## 2022-06-13 PROCEDURE — 3017F COLORECTAL CA SCREEN DOC REV: CPT | Performed by: STUDENT IN AN ORGANIZED HEALTH CARE EDUCATION/TRAINING PROGRAM

## 2022-06-13 PROCEDURE — G8417 CALC BMI ABV UP PARAM F/U: HCPCS | Performed by: STUDENT IN AN ORGANIZED HEALTH CARE EDUCATION/TRAINING PROGRAM

## 2022-06-13 PROCEDURE — 1036F TOBACCO NON-USER: CPT | Performed by: STUDENT IN AN ORGANIZED HEALTH CARE EDUCATION/TRAINING PROGRAM

## 2022-06-13 PROCEDURE — G8427 DOCREV CUR MEDS BY ELIG CLIN: HCPCS | Performed by: STUDENT IN AN ORGANIZED HEALTH CARE EDUCATION/TRAINING PROGRAM

## 2022-06-13 PROCEDURE — 99213 OFFICE O/P EST LOW 20 MIN: CPT | Performed by: STUDENT IN AN ORGANIZED HEALTH CARE EDUCATION/TRAINING PROGRAM

## 2022-06-13 NOTE — PROGRESS NOTES
[address] VISIT      Patient Name: Ponce 45Nor[age] Fang S Record Number:  [id number]  Primary Care Physician:  Suzette Gallego DO    ChiefComplaint     Chief Complaint   Patient presents with    Other     former  pt , having allergies       History of Present Illness     Oc Wallace is an 49 y.o. male previously seen for allergic rhinitis, last seen by Dr. Romulo Langston on 6/16/2020. Interval History:   Presenting with an episode of dizziness and forehead pressure. He gets these symptoms episodically, will last months at a time. Started in summer 2018. Feels like he has a sinus infection. He has been treated with 2 rounds of antibiotics and 1 round of steroids, most recently 1 month ago, none of which has helped. No history of migraines or headaches. This all started around the time he was placed on high blood pressure medicine. He takes lisinopril and metoprolol. He has been evaluated by neurology in the past, last seen by Dr. Vásquez Hedge September 2020 and noted to have cerebral tonsillar ectopia with nonsurgical recommendations. Also recommended to have sleep study performed at that time. In the past he has had itching of the eyes and watering of the eyes but no longer with these type of symptoms. No mucopurulent nasal discharge. Sense of smell is intact. No nasal congestion. He has been on Flonase and Astelin nasal sprays as well as Singulair daily. Has been taking an OTC allergy medicine at times, used to take claritin but not recently. Past Medical History     Past Medical History:   Diagnosis Date    Anxiety     Asthma 01/01/2000    late 20's-early 30s.     Closed fracture of head of fifth metacarpal     Boxer's fracture    Former smoker 05/2019    started age 17, quit age 53    Fracture of finger of right hand     And knuckle    History of blood transfusion     new this admission 6/10/19    Hypertension     Iron deficiency anemia due to chronic blood loss 10/31/2019    DUE TO GI BLEED/ULCERS    Lumbar disc disease 01/01/1994    L4-5 proteuding disc    Peptic ulcer 01/01/2015    EGD 3/2/2020: Distal circumferentialesophageal ulcer and distal Parra's. Gastric body and antrum gastritis       Past Surgical History     Past Surgical History:   Procedure Laterality Date    COLONOSCOPY  03/2015    1 benign polyp. [address] Due 0536-7131.  ENDOSCOPY, COLON, DIAGNOSTIC      LUMBAR SPINE SURGERY  01/01/2002    epidural steroids x3    NASAL ENDOSCOPY  03/11/2020    UPPER GASTROINTESTINAL ENDOSCOPY  3/2015    3 ulcers - peptic and esophageal    UPPER GASTROINTESTINAL ENDOSCOPY  06/2019    +ulcers    UPPER GASTROINTESTINAL ENDOSCOPY N/A 06/11/2019    EGD DIAGNOSTIC ONLY performed by Fernando Todd MD at [address]  08/19/2019    Dr. Maude Alford, ulcer healing in esophagitis, Parra's 1    UPPER GASTROINTESTINAL ENDOSCOPY N/A 01/06/2020    EGD CONTROL HEMORRHAGE performed by Sebastian Beal MD at [address] N/A 01/06/2020    EGD SUBMUCOSAL/ INJECTION OF EPINEPHRINE performed by Sebastian Beal MD at [address] N/A 03/02/2020    EGD BIOPSY performed by Ricardo Burgos MD at [address] [facility]  01/01/1983    and 1989       Family History     Family History   Problem Relation Age of Onset    Other Mother         gall bladder    Allergies Mother     Heart Attack Father 52        x3-4    Coronary Art Dis Father     Alcohol Abuse Father     Allergies Father     Other Brother         congenital calcium deposits n bones, COVID-19@ 48.     Allergies Brother     Hearing Loss Brother         congenital    Allergy (Severe) Brother     No Known Problems Brother         in a wreck as teen    Cancer Maternal Grandmother     Diabetes type 2  Maternal Grandmother     Cancer Maternal Grandfather     Asthma Paternal Grandfather     Cancer Maternal Aunt         brain    Other Maternal Aunt         lung issues    Lung Cancer Paternal Aunt     Lung Cancer Paternal Aunt     No Known Problems Son     No Known Problems Son        Social History     Social History     Tobacco Use    Smoking status: Former Smoker     Packs/day: 1.50     Years: 35.00     Pack years: 52.50     Types: Cigarettes     Start date: 1/1/1988     Quit date: 5/1/2019     Years since quitting: 3.1    Smokeless tobacco: Former User     Types: Snuff    Tobacco comment: started to smoke at 16 / smoked up to 1.5 ppd / smoked for 35 yrs on and off / 1 PPD (QUIT [id number])   Vaping Use    Vaping Use: Never used   Substance Use Topics    Alcohol use: No     Comment: rare alcohol    Drug use: Not Currently     Types: Marijuana Leonor Siomara)     Comment: was daily. None since 9/2018        Allergies     No Known Allergies    Medications     Current Outpatient Medications   Medication Sig Dispense Refill    pantoprazole (PROTONIX) 40 MG tablet Take 1 tablet by mouth twice daily 180 tablet 0    lisinopril (PRINIVIL;ZESTRIL) 20 MG tablet Take 1/2 (one-half) tablet by mouth once daily 45 tablet 0    azelastine (ASTELIN) 0.1 % nasal spray 1 spray by Nasal route 2 times daily Use in each nostril as directed 30 mL 5    montelukast (SINGULAIR) 10 MG tablet Take 1 tablet by mouth nightly 30 tablet 11    fluticasone (FLONASE) 50 MCG/ACT nasal spray 2 sprays by Each Nostril route daily 48 g 1    clonazePAM (KLONOPIN) 0.5 MG tablet Take 1 tablet by mouth daily for 90 days.  30 tablet 2    metoprolol tartrate (LOPRESSOR) 25 MG tablet Take 1 tablet by mouth twice daily 180 tablet 1    Omega-3 Fatty Acids (OMEGA 3 PO) Take 1 capsule by mouth every morning (before breakfast) Indications: Decreased HDL Cholesterol      budesonide-formoterol (SYMBICORT) 160-4.5 MCG/ACT AERO Inhale 2 puffs into the lungs 2 times daily 3 Inhaler 5    Zinc Acetate-Sweetleaf (FP ZINC LOZENGES PO) Take 0.5 lozenges by mouth 2 times daily       vitamin C (ASCORBIC ACID) 500 MG tablet Take 500 mg by mouth 2 times daily      Cholecalciferol (VITAMIN D3) 50 MCG (2000 UT) CAPS Take 1 capsule by mouth daily       Multiple Vitamins-Minerals (THERAPEUTIC MULTIVITAMIN-MINERALS) tablet Take 1 tablet by mouth daily      albuterol sulfate HFA (PROVENTIL HFA) 108 (90 Base) MCG/ACT inhaler Inhale 2 puffs into the lungs every 4 hours as needed for Wheezing or Shortness of Breath Please provide pt c spacer as well 1 Inhaler 5     No current facility-administered medications for this visit. Review of Systems     REVIEW OF SYSTEM: See HPI above    PhysicalExam     Vitals:    06/13/22 1056   BP: (!) 166/81   Site: Left Upper Arm   Position: Sitting   Cuff Size: Large Adult   Pulse: 65   Temp: 97.1 °F (36.2 °C)   TempSrc: Temporal       PHYSICAL EXAM  BP (!) 166/81 (Site: Left Upper Arm, Position: Sitting, Cuff Size: Large Adult)   Pulse 65   Temp 97.1 °F (36.2 °C) (Temporal)     GENERAL: No acute distress, alert and oriented. EYES: EOMI, Anti-icteric. NOSE: On anterior rhinoscopy there is no epistaxis, nasal mucosa moist and normal appearing, no purulent drainage. Nasal septum deviated to the right. Bilateral inferior turbinate hypertrophy.   EARS: Normal external appearance; on portable otomicroscopy:     -Ad: External auditory canal without stenosis, tympanic membrane clear, no middle ear effusions or retractions     -As: External auditory canal without stenosis, tympanic membrane clear, no middle ear effusions or retractions  FACE: HB 1/6 bilaterally, symmetric appearing, sensation equal bilaterally  ORAL CAVITY: No masses or lesions visualized or palpated, uvula is midline, moist mucous membranes, no oropharyngeal masses or oropharyngeal obstruction  NECK: Normal range of motion, no thyromegaly, trachea is midline, no palpable lymphadenopathy or neck masses, no crepitus  CHEST: Normal respiratory effort, breathing comfortably, no retractions  SKIN: No rashes, normal appearing skin, no evidence of skin lesions/tumors  NEURO: Cranial Nerves 2, 3, 4, 5, 6, 7, 11, 12 grossly intact bilaterally     I have performed a head and neck physical exam personally or was physically present during the key or critical portions of the service. Assessment and Plan     1. Chronic sinusitis, unspecified location  -Last CT scan sinus over 2 years ago. Will repeat. - CT SINUS FOR IMAGE GUIDANCE; Future    2. Facial pressure  -See above    3. Dizziness  -Denies vertigo. Dizziness may be secondary to antihypertensive regimen as this seemed to start episodically after starting on blood pressure medicine a couple years ago. If CT scan negative may benefit from cardiology referral for dizziness as there is a low suspicion for peripheral vestibulopathy    4. Allergic rhinitis, unspecified seasonality, unspecified trigger  5. DNS (deviated nasal septum)  6. Hypertrophy of both inferior nasal turbinates  -Continue Singulair daily  - Continue Flonase and Astelin daily  -Unfortunately patient is not a candidate for allergy immunotherapy injections given that he is on a beta-blocker      Follow-Up     Return for after imaging. Dr. Thi Camilo [place] [id number]  Department of Otolaryngology/Head and Neck Surgery  6/13/22    Medical Decision Making: The following items were considered in medical decision making:  Independent review of images  Review / order clinical lab tests  Review / order radiology tests  Decision to obtain old records      This note was generated completely or in part utilizing Dragon dictation speech recognition software. Occasionally, words are mistranscribed and despite editing, the text may contain inaccuracies due to incorrect word recognition.   If further clarification is needed please contact the office at [phone].

## 2022-06-23 ENCOUNTER — HOSPITAL ENCOUNTER (OUTPATIENT)
Dept: CT IMAGING | Age: 52
Discharge: HOME | End: 2022-06-23
Payer: MEDICARE

## 2022-06-23 DIAGNOSIS — R44.8 FACIAL PRESSURE: ICD-10-CM

## 2022-06-23 DIAGNOSIS — J32.9 CHRONIC SINUSITIS, UNSPECIFIED LOCATION: ICD-10-CM

## 2022-06-23 PROCEDURE — 70486 CT MAXILLOFACIAL W/O DYE: CPT

## 2022-07-07 ENCOUNTER — OFFICE VISIT (OUTPATIENT)
Dept: ENT CLINIC | Age: 52
End: 2022-07-07
Payer: MEDICARE

## 2022-07-07 ENCOUNTER — TELEPHONE (OUTPATIENT)
Dept: ENT CLINIC | Age: 52
End: 2022-07-07

## 2022-07-07 VITALS — TEMPERATURE: 97.5 F | SYSTOLIC BLOOD PRESSURE: 159 MMHG | DIASTOLIC BLOOD PRESSURE: 85 MMHG | HEART RATE: 74 BPM

## 2022-07-07 DIAGNOSIS — J30.9 ALLERGIC RHINITIS, UNSPECIFIED SEASONALITY, UNSPECIFIED TRIGGER: Primary | ICD-10-CM

## 2022-07-07 DIAGNOSIS — J34.1 MUCOUS RETENTION CYST OF MAXILLARY SINUS: ICD-10-CM

## 2022-07-07 DIAGNOSIS — J32.0 CHRONIC MAXILLARY SINUSITIS: ICD-10-CM

## 2022-07-07 DIAGNOSIS — J34.3 HYPERTROPHY OF BOTH INFERIOR NASAL TURBINATES: ICD-10-CM

## 2022-07-07 DIAGNOSIS — J34.2 DNS (DEVIATED NASAL SEPTUM): ICD-10-CM

## 2022-07-07 PROCEDURE — 3017F COLORECTAL CA SCREEN DOC REV: CPT | Performed by: STUDENT IN AN ORGANIZED HEALTH CARE EDUCATION/TRAINING PROGRAM

## 2022-07-07 PROCEDURE — G8427 DOCREV CUR MEDS BY ELIG CLIN: HCPCS | Performed by: STUDENT IN AN ORGANIZED HEALTH CARE EDUCATION/TRAINING PROGRAM

## 2022-07-07 PROCEDURE — 99214 OFFICE O/P EST MOD 30 MIN: CPT | Performed by: STUDENT IN AN ORGANIZED HEALTH CARE EDUCATION/TRAINING PROGRAM

## 2022-07-07 PROCEDURE — G8417 CALC BMI ABV UP PARAM F/U: HCPCS | Performed by: STUDENT IN AN ORGANIZED HEALTH CARE EDUCATION/TRAINING PROGRAM

## 2022-07-07 PROCEDURE — 1036F TOBACCO NON-USER: CPT | Performed by: STUDENT IN AN ORGANIZED HEALTH CARE EDUCATION/TRAINING PROGRAM

## 2022-07-07 NOTE — TELEPHONE ENCOUNTER
Referral orders for Allergy Testing received. Called patient and reviewed testing procedure,length of testing and expectations for stopping Astelin Nasal Spray 2 days before testing. Answered questions regarding location and expectations.   Scheduled for Allergy Testing on July 13th @ 9am

## 2022-07-07 NOTE — PROGRESS NOTES
[facility] FOLLOW-UP VISIT      Patient Name: Ponce Chapman Avlorena S Record Number:  [id number]  Primary Care Physician:  Rick De La Cruz DO    ChiefComplaint     Chief Complaint   Patient presents with    Follow-up     review CT scan        History of Present Illness     Jayla Payan is an 49 y.o. male previously seen for chronic sinusitis, facial pressure, dizziness, allergic rhinitis, deviated nasal septum, bilateral inferior turbinate hypertrophy. Interval History:   Continues to have lightheadedness, pressure in forehead across brow line. All since 2018. Still taking singulair, astelin, flonase. Nothing seems to work. No hx of sinonasal surgery. No hx of allergy testing. Has a dog and 2 cats at home. Past Medical History     Past Medical History:   Diagnosis Date    Anxiety     Asthma 01/01/2000    late 20's-early 30s.  Closed fracture of head of fifth metacarpal     Boxer's fracture    Former smoker 05/2019    started age 17, quit age 53    Fracture of finger of right hand     And knuckle    History of blood transfusion     new this admission 6/10/19    Hypertension     Iron deficiency anemia due to chronic blood loss 10/31/2019    DUE TO GI BLEED/ULCERS    Lumbar disc disease 01/01/1994    L4-5 proteuding disc    Peptic ulcer 01/01/2015    EGD 3/2/2020: Distal circumferentialesophageal ulcer and distal Parra's. Gastric body and antrum gastritis       Past Surgical History     Past Surgical History:   Procedure Laterality Date    COLONOSCOPY  03/2015    1 benign polyp. [address]. Due 5271-6985.     ENDOSCOPY, COLON, DIAGNOSTIC      LUMBAR SPINE SURGERY  01/01/2002    epidural steroids x3    NASAL ENDOSCOPY  03/11/2020    UPPER GASTROINTESTINAL ENDOSCOPY  3/2015    3 ulcers - peptic and esophageal    UPPER GASTROINTESTINAL ENDOSCOPY  06/2019    +ulcers    UPPER GASTROINTESTINAL ENDOSCOPY N/A 06/11/2019 EGD DIAGNOSTIC ONLY performed by Bhumi Miguel MD at [address]  08/19/2019    Dr. Angely Brown, ulcer healing in esophagitis, Parra's 1    UPPER GASTROINTESTINAL ENDOSCOPY N/A 01/06/2020    EGD CONTROL HEMORRHAGE performed by Vargas Alcala MD at [address] N/A 01/06/2020    EGD SUBMUCOSAL/ INJECTION OF EPINEPHRINE performed by Vargas Alcala MD [address] N/A 03/02/2020    EGD BIOPSY performed by Esha Phillips MD at [address] [facility]  01/01/1983    and 1989       Family History     Family History   Problem Relation Age of Onset    Other Mother         gall bladder    Allergies Mother     Heart Attack Father 52        x3-4    Coronary Art Dis Father     Alcohol Abuse Father     Allergies Father     Other Brother         congenital calcium deposits n bones, COVID-19@ 48.     Allergies Brother     Hearing Loss Brother         congenital    Allergy (Severe) Brother     No Known Problems Brother         in a wreck as teen    Cancer Maternal Grandmother     Diabetes type 2  Maternal Grandmother     Cancer Maternal Grandfather     Asthma Paternal Grandfather     Cancer Maternal Aunt         brain    Other Maternal Aunt         lung issues    Lung Cancer Paternal Aunt     Lung Cancer Paternal Aunt     No Known Problems Son     No Known Problems Son        Social History     Social History     Tobacco Use    Smoking status: Former Smoker     Packs/day: 1.50     Years: 35.00     Pack years: 52.50     Types: Cigarettes     Start date: 1/1/1988     Quit date: 5/1/2019     Years since quitting: 3.1    Smokeless tobacco: Former User     Types: Snuff    Tobacco comment: started to smoke at 16 / smoked up to 1.5 ppd / smoked for 35 yrs on and off / 1 PPD (QUIT [id number])   Vaping Use    Vaping Use: Never used   Substance Use Topics    Alcohol use: No     Comment: rare alcohol    Drug use: Not Currently     Types: Marijuana Tianna Hummel)     Comment: was daily. None since 9/2018        Allergies     No Known Allergies    Medications     Current Outpatient Medications   Medication Sig Dispense Refill    pantoprazole (PROTONIX) 40 MG tablet Take 1 tablet by mouth twice daily 180 tablet 0    lisinopril (PRINIVIL;ZESTRIL) 20 MG tablet Take 1/2 (one-half) tablet by mouth once daily 45 tablet 0    azelastine (ASTELIN) 0.1 % nasal spray 1 spray by Nasal route 2 times daily Use in each nostril as directed 30 mL 5    montelukast (SINGULAIR) 10 MG tablet Take 1 tablet by mouth nightly 30 tablet 11    fluticasone (FLONASE) 50 MCG/ACT nasal spray 2 sprays by Each Nostril route daily 48 g 1    clonazePAM (KLONOPIN) 0.5 MG tablet Take 1 tablet by mouth daily for 90 days. 30 tablet 2    metoprolol tartrate (LOPRESSOR) 25 MG tablet Take 1 tablet by mouth twice daily 180 tablet 1    Omega-3 Fatty Acids (OMEGA 3 PO) Take 1 capsule by mouth every morning (before breakfast) Indications: Decreased HDL Cholesterol      albuterol sulfate HFA (PROVENTIL HFA) 108 (90 Base) MCG/ACT inhaler Inhale 2 puffs into the lungs every 4 hours as needed for Wheezing or Shortness of Breath Please provide pt c spacer as well 1 Inhaler 5    budesonide-formoterol (SYMBICORT) 160-4.5 MCG/ACT AERO Inhale 2 puffs into the lungs 2 times daily 3 Inhaler 5    Zinc Acetate-Sweetleaf (FP ZINC LOZENGES PO) Take 0.5 lozenges by mouth 2 times daily       vitamin C (ASCORBIC ACID) 500 MG tablet Take 500 mg by mouth 2 times daily      Cholecalciferol (VITAMIN D3) 50 MCG (2000 UT) CAPS Take 1 capsule by mouth daily       Multiple Vitamins-Minerals (THERAPEUTIC MULTIVITAMIN-MINERALS) tablet Take 1 tablet by mouth daily       No current facility-administered medications for this visit.        Review of Systems     REVIEW OF SYSTEM: See HPI above    PhysicalExam     Vitals:    07/07/22 0832   BP: (!) 159/85   Site: Left Upper Arm   Position: Sitting   Cuff Size: Medium Adult   Pulse: 74   Temp: 97.5 °F (36.4 °C)   TempSrc: Temporal       PHYSICAL EXAM  BP (!) 159/85 (Site: Left Upper Arm, Position: Sitting, Cuff Size: Medium Adult)   Pulse 74   Temp 97.5 °F (36.4 °C) (Temporal)     GENERAL: No acute distress, alert and oriented. EYES: EOMI, Anti-icteric. NOSE: On anterior rhinoscopy there is no epistaxis, nasal mucosa moist and normal appearing, no purulent drainage. Nasal septum deviated to the left. Bilateral inferior turbinates hypertrophied. EARS: Normal external appearance; on portable otomicroscopy:     -Ad: External auditory canal without stenosis, tympanic membrane clear, no middle ear effusions or retractions     -As: External auditory canal without stenosis, tympanic membrane clear, no middle ear effusions or retractions  FACE: HB 1/6 bilaterally, symmetric appearing, sensation equal bilaterally  ORAL CAVITY: No masses or lesions visualized or palpated, uvula is midline, moist mucous membranes, no oropharyngeal masses or oropharyngeal obstruction  NECK: Normal range of motion, no thyromegaly, trachea is midline, no palpable lymphadenopathy or neck masses, no crepitus  CHEST: Normal respiratory effort, breathing comfortably, no retractions  SKIN: No rashes, normal appearing skin, no evidence of skin lesions/tumors  NEURO: Cranial Nerves 2, 3, 4, 5, 6, 7, 11, 12 grossly intact bilaterally     I have performed a head and neck physical exam personally or was physically present during the key or critical portions of the service. Data/Imaging Review     EXAMINATION:   CT OF THE SINUS WITHOUT CONTRAST 6/23/2022 6:20 am       TECHNIQUE:   CT of the sinuses was performed without the administration of intravenous   contrast. Multiplanar reformatted images are provided for review.  Automated   exposure control, iterative reconstruction, and/or weight based adjustment of   the mA/kV was utilized to reduce the radiation dose to as low as reasonably   achievable.       COMPARISON:   None       HISTORY:   ORDERING SYSTEM PROVIDED HISTORY: Chronic sinusitis, unspecified location   TECHNOLOGIST PROVIDED HISTORY:   Reason for Exam: Dx: Chronic sinusitis, unspecified location J32.9   (ICD-10-CM); Facial pressure R44.8 (ICD-10-CM)   Relevant Medical/Surgical History: Dx: Chronic sinusitis, unspecified   location J32.9 (ICD-10-CM); Facial pressure R44.8 (ICD-10-CM)       FINDINGS:   RIGHT [facility]: There is mucosal thickening of the right ostiomeatal unit.  The   frontoethmoidal recess is patent.  In the inferior half of the left maxillary   sinus there is an opacity with superiorly smooth convex contour.  There is   mucosal thickening of multiple ethmoid air cells in the lower frontal sinus.       LEFT [facility]: The ostiomeatal complex and frontal recess are patent.  There is   mild mucosal thickening of scattered ethmoid air cells in the frontal sinus.       SPHENOETHMOID: The sphenoethmoidal recesses are patent, and the bilateral   posterior ethmoid and sphenoid sinuses are clear. Sphenoid pneumatization is   sellar and does not extend into the anterior clinoid processes.  The optic   nerve and carotid canals are not dehiscent. The ethmoid roofs are symmetric.       NASAL CAVITY: Charli bullosa is noted bilaterally.       OTHER: The mastoid air cells are well aerated bilaterally.           Impression   Right ostiomeatal unit pattern of disease.  The right maxillary sinus is   moderately opacified.  Retention cyst is alternatively possible.  Mild   inflammatory disease of the ethmoid and frontal sinuses.       Mild inflammatory disease of the left ethmoid and frontal sinuses. Images from CT scan sinus without contrast performed on 6/23/2022 independent reviewed which demonstrates opacification of the right [facility] and greater than 50% opacification of the left maxillary sinus. Bilateral charli bullosa present.   Trace mucosal thickening within the right ethmoid sinus. Otherwise paranasal sinuses appear well aerated. Assessment and Plan     1. Allergic rhinitis, unspecified seasonality, unspecified trigger  - [place] INTRACUTANEOUS TESTS W/ALLERGENIC EXTRACTS  - [place] ALLERGY SKIN TESTS; Future    2. Mucous retention cyst of maxillary sinus  3. Chronic maxillary sinusitis  4. Hypertrophy of both inferior nasal turbinates  5. DNS (deviated nasal septum)    Plan:  Reviewed CT scan with the patient. He has some isolated sinus disease in his left maxillary and left anterior ethmoid sinus. Bilateral charli bullosa present. Discussed treatment options with patient including allergy testing and possible allergy immunotherapy versus sinus surgery. I did tell the patient that I am not certain that doing sinus surgery will fix all of his symptoms including forehead pressure and dizziness, as this may be related to his cerebellar ectopia as well for which she has been evaluated by neurology in the past.  [facility] has been maximally medically treated for allergies. We will plan for allergy testing and possible immunotherapy. He will follow-up after allergy testing. Follow-Up     Return after allergy testing. Ashley Marinzina   Department of Otolaryngology/Head and Neck Surgery  7/7/22    Medical Decision Making: The following items were considered in medical decision making:  Independent review of images  Review / order clinical lab tests  Review / order radiology tests  Decision to obtain old records      This note was generated completely or in part utilizing Dragon dictation speech recognition software. Occasionally, words are mistranscribed and despite editing, the text may contain inaccuracies due to incorrect word recognition. If further clarification is needed please contact the office at [phone].

## 2022-07-11 ENCOUNTER — OFFICE VISIT (OUTPATIENT)
Dept: FAMILY MEDICINE CLINIC | Age: 52
End: 2022-07-11
Payer: MEDICARE

## 2022-07-11 VITALS
OXYGEN SATURATION: 97 % | WEIGHT: 222 LBS | BODY MASS INDEX: 30.07 KG/M2 | SYSTOLIC BLOOD PRESSURE: 124 MMHG | HEART RATE: 68 BPM | DIASTOLIC BLOOD PRESSURE: 82 MMHG | RESPIRATION RATE: 18 BRPM | HEIGHT: 72 IN

## 2022-07-11 DIAGNOSIS — I10 ESSENTIAL HYPERTENSION: Primary | ICD-10-CM

## 2022-07-11 DIAGNOSIS — R07.9 LEFT-SIDED CHEST PAIN: ICD-10-CM

## 2022-07-11 DIAGNOSIS — F41.9 ANXIETY: ICD-10-CM

## 2022-07-11 DIAGNOSIS — Z79.899 ENCOUNTER FOR LONG-TERM (CURRENT) USE OF HIGH-RISK MEDICATION: ICD-10-CM

## 2022-07-11 DIAGNOSIS — J45.901 MODERATE ASTHMA WITH ACUTE EXACERBATION, UNSPECIFIED WHETHER PERSISTENT: ICD-10-CM

## 2022-07-11 DIAGNOSIS — J30.1 NON-SEASONAL ALLERGIC RHINITIS DUE TO POLLEN: ICD-10-CM

## 2022-07-11 DIAGNOSIS — G47.33 OBSTRUCTIVE SLEEP APNEA SYNDROME: ICD-10-CM

## 2022-07-11 PROBLEM — Q04.8 CEREBELLAR TONSILLAR ECTOPIA (HCC): Chronic | Status: ACTIVE | Noted: 2021-05-04

## 2022-07-11 LAB
ALCOHOL URINE: NORMAL
AMPHETAMINE SCREEN, URINE: NEGATIVE
BARBITURATE SCREEN, URINE: NORMAL
BENZODIAZEPINE SCREEN, URINE: NEGATIVE
BUPRENORPHINE URINE: NEGATIVE
COCAINE METABOLITE SCREEN URINE: NEGATIVE
FENTANYL SCREEN, URINE: NORMAL
GABAPENTIN SCREEN, URINE: NORMAL
MDMA URINE: NEGATIVE
METHADONE SCREEN, URINE: NEGATIVE
METHAMPHETAMINE, URINE: NEGATIVE
OPIATE SCREEN URINE: NEGATIVE
OXYCODONE SCREEN URINE: NEGATIVE
PHENCYCLIDINE SCREEN URINE: NEGATIVE
PROPOXYPHENE SCREEN, URINE: NORMAL
SYNTHETIC CANNABINOIDS(K2) SCREEN, URINE: NORMAL
THC SCREEN, URINE: NEGATIVE
TRAMADOL SCREEN URINE: NORMAL
TRICYCLIC ANTIDEPRESSANTS, UR: NEGATIVE

## 2022-07-11 PROCEDURE — 3017F COLORECTAL CA SCREEN DOC REV: CPT | Performed by: FAMILY MEDICINE

## 2022-07-11 PROCEDURE — 80305 DRUG TEST PRSMV DIR OPT OBS: CPT | Performed by: FAMILY MEDICINE

## 2022-07-11 PROCEDURE — G8417 CALC BMI ABV UP PARAM F/U: HCPCS | Performed by: FAMILY MEDICINE

## 2022-07-11 PROCEDURE — 1036F TOBACCO NON-USER: CPT | Performed by: FAMILY MEDICINE

## 2022-07-11 PROCEDURE — 99215 OFFICE O/P EST HI 40 MIN: CPT | Performed by: FAMILY MEDICINE

## 2022-07-11 PROCEDURE — 93000 ELECTROCARDIOGRAM COMPLETE: CPT | Performed by: FAMILY MEDICINE

## 2022-07-11 PROCEDURE — G8427 DOCREV CUR MEDS BY ELIG CLIN: HCPCS | Performed by: FAMILY MEDICINE

## 2022-07-11 NOTE — PROGRESS NOTES
follow-up appointments and taking medicines exactly as directed. Thiswill help you recover and help prevent future problems. How can you care for yourself at home? Rest until you feel better. Take your medicine exactly as prescribed. Call your doctor if you think you are having a problem with your medicine. Do not drive after taking a prescription pain medicine. When should you call for help? Call 911 if: You passed out (lost consciousness). You have severe difficulty breathing. You have symptoms of a heart attack. These may include:  Chest pain or pressure, or a strange feeling in your chest.  Sweating. Shortness of breath. Nausea or vomiting. Pain, pressure, or a strange feeling in your back, neck, jaw, or upper belly or in one or both shoulders or arms. Lightheadedness or sudden weakness. A fast or irregular heartbeat. After you call 911, the  may tell you to chew 1 adult-strength or 2 to 4 low-dose aspirin. Wait for an ambulance. Do not try to drive yourself. Call your doctor today if:     You have any trouble breathing. Your chest pain gets worse. You are dizzy or lightheaded, or you feel like you may faint. You are not getting better as expected. You are having new or different chest pain. Where can you learn more? Go to https://Connolly.EMcube. org and sign in to your Prospect[employer]ise account. Enter A120 in the [facility] box to learn more about \"Chest Pain: Care Instructions. \"     If you do not have an account, please click on the \"Sign Up Now\" link. Current as of: March 9, 2022               Content Version: 13.3  © 3636-3085 [employer]AMINTOIT. Care instructions adapted under license by [facility] ([facility]). If you have questions about a medical condition or this instruction, always ask your healthcare professional. [place] [address] any warranty or liability for your use of this information.     Patient Education Stretch just after you have applied heat. As your pain gets better, slowly return to your normal activities. Any increased pain may be a sign that you need to rest a while longer. When should you call for help? Call 911 anytime you think you may need emergency care. For example, call if:    You have chest pain or pressure. This may occur with:  Sweating. Shortness of breath. Nausea or vomiting. Pain that spreads from the chest to the neck, jaw, or one or both shoulders or arms. Dizziness or lightheadedness. A fast or uneven pulse. After calling 911, chew 1 adult-strength aspirin. Wait for an ambulance. Do not try to drive yourself. You have sudden chest pain and shortness of breath, or you cough up blood. Call your doctor now or seek immediate medical care if:    You have any trouble breathing. Your chest pain gets worse. Your chest pain occurs consistently with exercise and is relieved by rest.   Watch closely for changes in your health, and be sure to contact your doctor if:    Your chest pain does not get better after 1 week. Where can you learn more? Go to [email or web address]://Aragon [email or web address] Jean.Instant [email or web address] org and sign in to your [employer] account. Enter 9783 [id number] in the [facility] box to learn more about \"Musculoskeletal Chest Pain: Care Instructions. \"     If you do not have an account, please click on the \"Sign Up Now\" link. Current as of: March 9, 2022               Content Version: 13.3  © 2006-2022 Careem. Care instructions adapted under license by [facility] ([facility]). If you have questions about a medical condition or this instruction, always ask your healthcare professional. [place] [address] any warranty or liability for your use of this information. Patient Education   Gastroesophageal Reflux Disease (GERD): Care Instructions  Overview     Gastroesophageal reflux disease (GERD) is the backward flow of stomach acid into the esophagus.  The esophagus is the tube that leads from your throat to your stomach. A one-way valve prevents the stomach acid from backing up into this tube. But when you have GERD, this valve does not close tightly enough. This can also cause pain and swelling in your esophagus. (This is calledesophagitis.)  If you have mild GERD symptoms including heartburn, you may be able to control the problem with antacids or over-the-counter medicine. You can also make lifestyle changes to help reduce your symptoms. These include changing yourdiet and eating habits, such as not eating late at night and losing weight. Follow-up care is a key part of your treatment and safety. Be sure to make and go to all appointments, and call your doctor if you are having problems. It's also a good idea to know your test results and keep alist of the medicines you take. How can you care for yourself at home? Take your medicines exactly as prescribed. Call your doctor if you think you are having a problem with your medicine. Your doctor may recommend over-the-counter medicine. For mild or occasional indigestion, antacids, such as Tums, Gaviscon, Mylanta, or Maalox, may help. Your doctor also may recommend over-the-counter acid reducers, such as famotidine (Pepcid AC), cimetidine (Tagamet HB), or omeprazole (Prilosec). Read and follow all instructions on the label. If you use these medicines often, talk with your doctor. Change your eating habits. It's best to eat several small meals instead of two or three large meals. After you eat, wait 2 to 3 hours before you lie down. Avoid foods that make your symptoms worse. These may include chocolate, mint, alcohol, pepper, spicy foods, high-fat foods, or drinks with caffeine in them, such as tea, coffee, salazar, or energy drinks. If your symptoms are worse after you eat a certain food, you may want to stop eating it to see if your symptoms get better. Do not smoke or chew tobacco. Smoking can make GERD worse.  If you need help quitting, talk to your doctor about stop-smoking programs and medicines. These can increase your chances of quitting for good. If you have GERD symptoms at night, raise the head of your bed 6 to 8 inches by putting the frame on blocks or placing a foam wedge under the head of your mattress. (Adding extra pillows does not work.)  Do not wear tight clothing around your middle. Lose weight if you need to. Losing just 5 to 10 pounds can help. When should you call for help? Call your doctor now or seek immediate medical care if:    You have new or different belly pain. Your stools are black and tarlike or have streaks of blood. Watch closely for changes in your health, and be sure to contact your doctor if:    Your symptoms have not improved after 2 days. Food seems to catch in your throat or chest.   Where can you learn more? Go to https://Baanto Internationalpepiceweb.Videolicious. org and sign in to your [employer] account. Enter C706 in the [employer] box to learn more about \"Gastroesophageal Reflux Disease (GERD): Care Instructions. \"     If you do not have an account, please click on the \"Sign Up Now\" link. Current as of: September 8, 2021               Content Version: 13.3  © 9140-9869 Healthwise, [employer]. Care instructions adapted under license by [facility] ([facility]). If you have questions about a medical condition or this instruction, always ask your healthcare professional. [place] [address] any warranty or liability for your use of this information. Return in about 3 months (around 10/11/2022) for Hypertension, Anxiety, GERD. Subjective   SUBJECTIVE/OBJECTIVE:  Chief Complaint   Patient presents with    Hypertension     FOLLOW UP ON HTN    Anxiety     FOLLOW UP ON ANXIETY    Asthma     FOLLOW UP ON ASTHMA   [phone]  HPI    Essential hypertension  BP was high at ENT both times. Having CP lasting for second. 8-10x/3 months. More often when mows lawn.  Happens in bed as well. Father had 3 MIs and Pat aunts x 3 with MI. No radiation to the left arm. Moderate asthma persistent  He is using his Symbicort inhaler when cuts the grass. Rare albuterol. No colds nor known COVID exposure. Non-seasonal allergic rhinitis due to pollen  Sees ENt   Obstructive sleep apnea syndrome  Not on CPAP. Wakes feeling refreshed after 5 min when not on Clonazepam.  Esophageal ulcer  No Sx on the Pantoprazole. No NSAIDS. Uses Tylenol for pain. Anxiety  Uses med prn. Has filled 2x/since last visit. Was very Fatigued on the med. Review of Systems   Past Medical History:   Diagnosis Date    Anxiety     Asthma 01/01/2000    late 20's-early 30s. Cerebellar tonsillar ectopia ([facility] [address] 5/4/2021    Class 1 obesity due to excess calories without serious comorbidity with body mass index (BMI) of 31.0 to 31.9 in adult 9/14/2020    Closed fracture of head of fifth metacarpal     Boxer's fracture    Former smoker 05/2019    started age 17, quit age 53    Fracture of finger of right hand     And knuckle    GI bleed 6/10/2019    History of blood transfusion     new this admission 6/10/19    Hypertension     Iron deficiency anemia due to chronic blood loss 10/31/2019    DUE TO GI BLEED/ULCERS    Lumbar disc disease 01/01/1994    L4-5 proteuding disc    Peptic ulcer 01/01/2015    EGD 3/2/2020: Distal circumferentialesophageal ulcer and distal Parra's. Gastric body and antrum gastritis    Seasonal allergic rhinitis        Past Surgical History:   Procedure Laterality Date    COLONOSCOPY  03/2015    1 benign polyp. Fernando/Clyde Ricketts. Due 2919-1261.     ENDOSCOPY, COLON, DIAGNOSTIC      LUMBAR SPINE SURGERY  01/01/2002    epidural steroids x3    NASAL ENDOSCOPY  03/11/2020    UPPER GASTROINTESTINAL ENDOSCOPY  3/2015    3 ulcers - peptic and esophageal    UPPER GASTROINTESTINAL ENDOSCOPY  06/2019    +ulcers    UPPER GASTROINTESTINAL ENDOSCOPY N/A 06/11/2019    EGD DIAGNOSTIC ONLY performed by Nain Andino MD at [facility]    UPPER GASTROINTESTINAL ENDOSCOPY  08/19/2019    Dr. Ryan [facility], ulcer healing in esophagitis, Parra's 1    UPPER GASTROINTESTINAL ENDOSCOPY N/A 01/06/2020    EGD CONTROL HEMORRHAGE performed by Janes Nuñez MD at [address] 01/06/2020    EGD SUBMUCOSAL/ INJECTION OF EPINEPHRINE performed by Janes Nuñez MD at [address] N/A 03/02/2020    EGD BIOPSY performed by Mery Huffman MD at [address] [facility]  01/01/1983    and 1989       Social History     Socioeconomic History    Marital status: Single     Spouse name: Not on file    Number of children: 2    Years of education: 12    Highest education level: High school graduate   Occupational History    Occupation: FACTORY IN PAST     Comment: unemployed 11/2018   Tobacco Use    Smoking status: Former Smoker     Packs/day: 1.50     Years: 35.00     Pack years: 52.50     Types: Cigarettes     Start date: 1/1/1988     Quit date: 5/1/2019     Years since quitting: 3.1    Smokeless tobacco: Former User     Types: Snuff    Tobacco comment: started to smoke at 16 / smoked up to 1.5 ppd / smoked for 35 yrs on and off / 1 PPD (QUIT 6630-1073)   Vaping Use    Vaping Use: Never used   Substance and Sexual Activity    Alcohol use: No     Comment: rare alcohol    Drug use: Not Currently     Types: Marijuana Champ Cue)     Comment: was daily. None since 9/2018    Sexual activity: Yes   Other Topics Concern    Not on file   Social History Narrative    Exercise - too light headed. 3/26/19. None. 1/15/20. Walks every am about 1.5 mi 7 days/wk. 4/14/20 2.3 mi every day. 5/4/20. Walks 2 mi/day. 7/15/20. Was having anxiety attacks while walking his dog so stopped for 2 months. 10/6/20. Walking 1.5 mi 7 day/wk. 8/24/21. 1.5-1.75 mi/day x7 day/wk. 12/7/21. Also Does 90 min 7 days core body. 3/8/22. Walks 2 mi/day 7 day/wk. 7/11/22.      Social Determinants of Health     Financial Resource Strain: Unknown    Difficulty of Paying Living Expenses: Patient refused   Food Insecurity: Unknown    Worried About Running Out of Food in the Last Year: Patient refused    [address] in the Last Year: Patient refused   Transportation Needs: Unknown    Lack of Transportation (Medical): Patient refused    Lack of Transportation (Non-Medical): Patient refused   Physical Activity:     Days of Exercise per Week: Not on file    Minutes of Exercise per Session: Not on file   Stress:     Feeling of Stress : Not on file   Social Connections:     Frequency of Communication with Friends and Family: Not on file    Frequency of Social Gatherings with Friends and Family: Not on file    Attends Mosque Services: Not on file    Active Member of [address] [place] [address] [employer] or Organizations: Not on file    Attends Club or Organization Meetings: Not on file    Marital Status: Not on file   Intimate Partner Violence:     Fear of Current or Ex-Partner: Not on file    Emotionally Abused: Not on file    Physically Abused: Not on file    Sexually Abused: Not on file   Housing Stability:     Unable to Pay for Housing in the Last Year: Not on file    Number of [place] in the Last Year: Not on file    Unstable Housing in the Last Year: Not on file       Family History   Problem Relation Age of Onset    Other Mother         gall bladder    Allergies Mother     Heart Attack Father 52        x3-4    Coronary Art Dis Father     Alcohol Abuse Father     Allergies Father     Other Brother         congenital calcium deposits n bones, COVID-19@ 48.     Allergies Brother     Hearing Loss Brother         congenital    Allergy (Severe) Brother     No Known Problems Brother         in a wreck as teen    Cancer Maternal Grandmother     Diabetes type 2  Maternal Grandmother     Cancer Maternal Grandfather     Asthma Paternal Grandfather     No Known Problems Son     No Known Problems Son     Cancer Maternal Aunt         brain Other Maternal Aunt         lung issues    Heart Attack Paternal Aunt 46    Coronary Art Dis Paternal Aunt     Cancer Paternal Aunt         ? lung? Lung Cancer Paternal Aunt     Lung Cancer Paternal Aunt     Lung Cancer Paternal Uncle        No Known Allergies    Current Outpatient Medications   Medication Sig Dispense Refill    pantoprazole (PROTONIX) 40 MG tablet Take 1 tablet by mouth twice daily 180 tablet 0    lisinopril (PRINIVIL;ZESTRIL) 20 MG tablet Take 1/2 (one-half) tablet by mouth once daily 45 tablet 0    azelastine (ASTELIN) 0.1 % nasal spray 1 spray by Nasal route 2 times daily Use in each nostril as directed 30 mL 5    montelukast (SINGULAIR) 10 MG tablet Take 1 tablet by mouth nightly 30 tablet 11    fluticasone (FLONASE) 50 MCG/ACT nasal spray 2 sprays by Each Nostril route daily 48 g 1    clonazePAM (KLONOPIN) 0.5 MG tablet Take 1 tablet by mouth daily for 90 days. 30 tablet 2    metoprolol tartrate (LOPRESSOR) 25 MG tablet Take 1 tablet by mouth twice daily 180 tablet 1    Omega-3 Fatty Acids (OMEGA 3 PO) Take 1 capsule by mouth every morning (before breakfast) Indications: Decreased HDL Cholesterol      albuterol sulfate HFA (PROVENTIL HFA) 108 (90 Base) MCG/ACT inhaler Inhale 2 puffs into the lungs every 4 hours as needed for Wheezing or Shortness of Breath Please provide pt c spacer as well 1 Inhaler 5    budesonide-formoterol (SYMBICORT) 160-4.5 MCG/ACT AERO Inhale 2 puffs into the lungs 2 times daily 3 Inhaler 5    Zinc Acetate-Sweetleaf (FP ZINC LOZENGES PO) Take 0.5 lozenges by mouth 2 times daily       vitamin C (ASCORBIC ACID) 500 MG tablet Take 500 mg by mouth 2 times daily      Cholecalciferol (VITAMIN D3) 50 MCG (2000 UT) CAPS Take 1 capsule by mouth daily       Multiple Vitamins-Minerals (THERAPEUTIC MULTIVITAMIN-MINERALS) tablet Take 1 tablet by mouth daily       No current facility-administered medications for this visit.         Objective    Vitals:    07/11/22 0759   BP: 124/82 Site: Right Upper Arm   Position: Sitting   Cuff Size: Small Adult   Pulse: 68   Resp: 18   SpO2: 97%   Weight: 222 lb (100.7 kg)   Height: 6' (1.829 m)     BP Readings from Last 3 Encounters:   07/11/22 124/82   07/07/22 (!) 159/85   06/13/22 (!) 166/81     Pulse Readings from Last 3 Encounters:   07/11/22 68   07/07/22 74   06/13/22 65     Wt Readings from Last 3 Encounters:   07/11/22 222 lb (100.7 kg)   03/08/22 214 lb (97.1 kg)   12/07/21 220 lb (99.8 kg)     Body mass index is 30.11 kg/m². Physical Exam  Vitals and nursing note reviewed. Constitutional:       General: He is not in acute distress. Appearance: He is well-developed. He is not diaphoretic. Eyes:      General: No scleral icterus. Right eye: No discharge. Left eye: No discharge. Conjunctiva/sclera: Conjunctivae normal.   Neck:      Thyroid: No thyroid mass or thyromegaly. Vascular: No carotid bruit or JVD. Trachea: Trachea and phonation normal. No tracheal tenderness or tracheal deviation. Cardiovascular:      Rate and Rhythm: Normal rate and regular rhythm. Heart sounds: Normal heart sounds, S1 normal and S2 normal. Heart sounds not distant. No murmur heard. No friction rub. No gallop. No S3 or S4 sounds. Pulmonary:      Effort: Pulmonary effort is normal. No respiratory distress. Breath sounds: Normal breath sounds. No stridor. No decreased breath sounds, wheezing, rhonchi or rales. Abdominal:      General: There is no distension. Tenderness: There is no abdominal tenderness (no epigastric tenderness. ). Musculoskeletal:      Cervical back: Neck supple. Lymphadenopathy:      Head:      Right side of head: No submandibular or tonsillar adenopathy. Left side of head: No submandibular or tonsillar adenopathy. Cervical: No cervical adenopathy. Right cervical: No superficial, deep or posterior cervical adenopathy.      Left cervical: No superficial, deep or posterior cervical adenopathy. Skin:     General: Skin is warm and dry. Coloration: Skin is not pale. Neurological:      Mental Status: He is alert. Deep Tendon Reflexes:      Reflex Scores:       Patellar reflexes are 2+ on the right side and 2+ on the left side. Psychiatric:         Attention and Perception: Attention and perception normal.         Mood and Affect: Mood and affect normal.         Speech: Speech normal.         Behavior: Behavior normal. Behavior is cooperative.          Cognition and Memory: Cognition and memory normal.         Judgment: Judgment normal.      Latest Reference Range & Units 3/8/22 08:53   CHOLESTEROL, TOTAL, 349205 0 - 199 mg/dL 162   HDL Cholesterol 40 - 60 mg/dL 36 (L)   LDL Calculated <100 mg/dL 108 (H)   Triglycerides 0 - 150 mg/dL 89   VLDL Cholesterol Calculated Not Established mg/dL 18   Homocysteine 0 - 10 umol/L 8   Albumin 3.4 - 5.0 g/dL 4.8   Albumin/Globulin Ratio 1.1 - 2.2  1.9   Alk Phos 40 - 129 U/L 94   ALT 10 - 40 U/L 17   AST 15 - 37 U/L 15   Bilirubin 0.0 - 1.0 mg/dL 0.6   Total Protein 6.4 - 8.2 g/dL 7.3   GLUCOSE, FASTING,GF 70 - 99 mg/dL 97   Vit D, 25-Hydroxy >=30 ng/mL 53.0   WBC 4.0 - 11.0 K/uL 8.8   RBC 4.20 - 5.90 M/uL 5.37   Hemoglobin Quant 13.5 - 17.5 g/dL 15.7   Hematocrit 40.5 - 52.5 % 47.1   MCV 80.0 - 100.0 fL 87.6   MCH 26.0 - 34.0 pg 29.2   MCHC 31.0 - 36.0 g/dL 33.4   MPV 5.0 - 10.5 fL 8.4   RDW 12.4 - 15.4 % 13.7   Platelet Count 912 - 450 K/uL 365   Neutrophils % % 71.2   Lymphocyte % % 20.3   Monocytes % % 6.7   Eosinophils % % 1.6   Basophils % % 0.2   Neutrophils Absolute 1.7 - 7.7 K/uL 6.3   Lymphocytes Absolute 1.0 - 5.1 K/uL 1.8   Monocytes Absolute 0.0 - 1.3 K/uL 0.6   Eosinophils Absolute 0.0 - 0.6 K/uL 0.1   Basophils Absolute 0.0 - 0.2 K/uL 0.0   (L): Data is abnormally low  (H): Data is abnormally high    Results for POC orders placed in visit on 07/11/22   POCT Rapid Drug Screen   Result Value Ref Range    Alcohol, Urine N/A Amphetamine Screen, Urine NEGATIVE     Barbiturate Screen, Urine NEGATVE     Benzodiazepine Screen, Urine NEGATIVE     Buprenorphine Urine NEGATIVE     Cocaine Metabolite Screen, Urine NEGATIVE     FENTANYL SCREEN, URINE N/A     Gabapentin Screen, Urine N/A     MDMA, Urine NEGATIVE     Methadone Screen, Urine NEGATIVE     Methamphetamine, Urine NEGATIVE     Opiate Scrn, Ur NEGATIVE     Oxycodone Screen, Ur NEGATIVE     PCP Screen, Urine NEGATIVE     Propoxyphene Screen, Urine N/A     Synthetic Cannabinoids (K2) Screen, Urine N/A     THC Screen, Urine NEGATIVE     Tramadol Scrn, Ur N/A     Tricyclic Antidepressants, Urine NEGATIVE      EKG 7/11/2022  Sinus  Bradycardia T wave is attenuated in lead III and flat in aVL. QRS is isoelectric in lead aVL  WITHIN NORMAL LIMITS       On this date 7/11/2022 I have spent 47 minutes reviewing previous notes, test results and face to face with the patient discussing the diagnosis and importance of compliance with the treatment plan as well as documenting on the day of the visit. An electronic signature was used to authenticate this note.     --Nidia Pascal DO

## 2022-07-11 NOTE — PATIENT INSTRUCTIONS
Odilia Enrique was seen today for hypertension, anxiety and asthma. Diagnoses and all orders for this visit:    Essential hypertension  -     Good control.  -     Continue meds and lifestyle control. Moderate asthma with acute exacerbation, unspecified whether persistent  Fair control. Continue Symbicort as needed but use with SOB. Non-seasonal allergic rhinitis due to pollen  Per the ENT. Obstructive sleep apnea syndrome  Will make anxiety worse. Left-sided chest pain  -     EKG 12 Lead  EKG     Anxiety  -     POCT Rapid Drug Screen  Fair control. Encounter for long-term (current) use of high-risk medication  -     POCT Rapid Drug Screen      Patient Education        Chest Pain: Care Instructions  Your Care Instructions     There are many things that can cause chest pain. Some are not serious and will get better on their own in a few days. But some kinds of chest pain need more testing and treatment. Your doctor may have recommended a follow-up visit in the next 8 to 12 hours. If you are not getting better, you may need more testsor treatment. Even though your doctor has released you, you still need to watch for any problems. The doctor carefully checked you, but sometimes problems can develop later. If you have new symptoms or if your symptoms do not get better, getmedical care right away. If you have worse or different chest pain or pressure that lasts more than 5 minutes or you passed out (lost consciousness), call 911 or seek other emergency help right away. A medical visit is only one step in your treatment. Even if you feel better, you still need to do what your doctor recommends, such as going to all suggested follow-up appointments and taking medicines exactly as directed. Thiswill help you recover and help prevent future problems. How can you care for yourself at home?  Rest until you feel better.  Take your medicine exactly as prescribed.  Call your doctor if you think you are having a problem with your medicine.  Do not drive after taking a prescription pain medicine. When should you call for help? Call 911 if:     You passed out (lost consciousness).      You have severe difficulty breathing.      You have symptoms of a heart attack. These may include:  ? Chest pain or pressure, or a strange feeling in your chest.  ? Sweating. ? Shortness of breath. ? Nausea or vomiting. ? Pain, pressure, or a strange feeling in your back, neck, jaw, or upper belly or in one or both shoulders or arms. ? Lightheadedness or sudden weakness. ? A fast or irregular heartbeat. After you call 911, the  may tell you to chew 1 adult-strength or 2 to 4 low-dose aspirin. Wait for an ambulance. Do not try to drive yourself. Call your doctor today if:      You have any trouble breathing.      Your chest pain gets worse.      You are dizzy or lightheaded, or you feel like you may faint.      You are not getting better as expected.      You are having new or different chest pain. Where can you learn more? Go to https://EventSorbet.Letsdecco. org and sign in to your Make It Work account. Enter A120 in the SCHAD box to learn more about \"Chest Pain: Care Instructions. \"     If you do not have an account, please click on the \"Sign Up Now\" link. Current as of: March 9, 2022               Content Version: 13.3  © 2006-2022 [employer]. Care instructions adapted under license by [facility] ([facility]). If you have questions about a medical condition or this instruction, always ask your healthcare professional. [place] [address] any warranty or liability for your use of this information. Patient Education        Musculoskeletal Chest Pain: Care Instructions  Your Care Instructions     Chest pain is not always a sign that something is wrong with your heart or that you have another serious problem.  The doctor thinks your chest pain is caused by strained muscles or ligaments, inflamed chest cartilage, or another problem in your chest, rather than by your heart. You may need more tests to find thecause of your chest pain. Follow-up care is a key part of your treatment and safety. Be sure to make and go to all appointments, and call your doctor if you are having problems. It's also a good idea to know your test results and keep alist of the medicines you take. How can you care for yourself at home?  Take pain medicines exactly as directed. ? If the doctor gave you a prescription medicine for pain, take it as prescribed. ? If you are not taking a prescription pain medicine, ask your doctor if you can take an over-the-counter medicine.  Rest and protect the sore area.  Stop, change, or take a break from any activity that may be causing your pain or soreness.  Put ice or a cold pack on the sore area for 10 to 20 minutes at a time. Try to do this every 1 to 2 hours for the next 3 days (when you are awake) or until the swelling goes down. Put a thin cloth between the ice and your skin.  After 2 or 3 days, apply a heating pad set on low or a warm cloth to the area that hurts. Some doctors suggest that you go back and forth between hot and cold.  Do not wrap or tape your ribs for support. This may cause you to take smaller breaths, which could increase your risk of lung problems.  Mentholated creams such as Bengay or Icy Hot may soothe sore muscles. Follow the instructions on the package.  Follow your doctor's instructions for exercising.  Gentle stretching and massage may help you get better faster. Stretch slowly to the point just before pain begins, and hold the stretch for at least 15 to 30 seconds. Do this 3 or 4 times a day. Stretch just after you have applied heat.  As your pain gets better, slowly return to your normal activities. Any increased pain may be a sign that you need to rest a while longer. When should you call for help?    Call [phone] anytime you think you may need emergency care. For example, call if:     You have chest pain or pressure. This may occur with:  ? Sweating. ? Shortness of breath. ? Nausea or vomiting. ? Pain that spreads from the chest to the neck, jaw, or one or both shoulders or arms. ? Dizziness or lightheadedness. ? A fast or uneven pulse. After calling 911, chew 1 adult-strength aspirin. Wait for an ambulance. Do not try to drive yourself.      You have sudden chest pain and shortness of breath, or you cough up blood. Call your doctor now or seek immediate medical care if:     You have any trouble breathing.      Your chest pain gets worse.      Your chest pain occurs consistently with exercise and is relieved by rest.   Watch closely for changes in your health, and be sure to contact your doctor if:     Your chest pain does not get better after 1 week. Where can you learn more? Go to https://Neomobile.GetJar. org and sign in to your [employer] account. Enter 9521 3480 in the Zimory box to learn more about \"Musculoskeletal Chest Pain: Care Instructions. \"     If you do not have an account, please click on the \"Sign Up Now\" link. Current as of: March 9, 2022               Content Version: 13.3  © 2006-2022 Healthwise, [employer]. Care instructions adapted under license by [facility] ([facility]). If you have questions about a medical condition or this instruction, always ask your healthcare professional. [place] [address] any warranty or liability for your use of this information. Patient Education        Gastroesophageal Reflux Disease (GERD): Care Instructions  Overview     Gastroesophageal reflux disease (GERD) is the backward flow of stomach acid into the esophagus. The esophagus is the tube that leads from your throat to your stomach. A one-way valve prevents the stomach acid from backing up into this tube.  But when you have GERD, this valve does not close tightly enough. This can also cause pain and swelling in your esophagus. (This is calledesophagitis.)  If you have mild GERD symptoms including heartburn, you may be able to control the problem with antacids or over-the-counter medicine. You can also make lifestyle changes to help reduce your symptoms. These include changing yourdiet and eating habits, such as not eating late at night and losing weight. Follow-up care is a key part of your treatment and safety. Be sure to make and go to all appointments, and call your doctor if you are having problems. It's also a good idea to know your test results and keep alist of the medicines you take. How can you care for yourself at home?  Take your medicines exactly as prescribed. Call your doctor if you think you are having a problem with your medicine.  Your doctor may recommend over-the-counter medicine. For mild or occasional indigestion, antacids, such as Tums, Gaviscon, Mylanta, or Maalox, may help. Your doctor also may recommend over-the-counter acid reducers, such as famotidine (Pepcid AC), cimetidine (Tagamet HB), or omeprazole (Prilosec). Read and follow all instructions on the label. If you use these medicines often, talk with your doctor.  Change your eating habits. ? It's best to eat several small meals instead of two or three large meals. ? After you eat, wait 2 to 3 hours before you lie down. ? Avoid foods that make your symptoms worse. These may include chocolate, mint, alcohol, pepper, spicy foods, high-fat foods, or drinks with caffeine in them, such as tea, coffee, salazar, or energy drinks. If your symptoms are worse after you eat a certain food, you may want to stop eating it to see if your symptoms get better.  Do not smoke or chew tobacco. Smoking can make GERD worse. If you need help quitting, talk to your doctor about stop-smoking programs and medicines. These can increase your chances of quitting for good.    If you have GERD symptoms at night, raise the head of your bed 6 to 8 inches by putting the frame on blocks or placing a foam wedge under the head of your mattress. (Adding extra pillows does not work.)   Do not wear tight clothing around your middle.  Lose weight if you need to. Losing just 5 to 10 pounds can help. When should you call for help? Call your doctor now or seek immediate medical care if:     You have new or different belly pain.      Your stools are black and tarlike or have streaks of blood. Watch closely for changes in your health, and be sure to contact your doctor if:     Your symptoms have not improved after 2 days.      Food seems to catch in your throat or chest.   Where can you learn more? Go to https://Airborne Mobile.VibeSec. org and sign in to your E[employer]y account. Enter M240 in the [employer] box to learn more about \"Gastroesophageal Reflux Disease (GERD): Care Instructions. \"     If you do not have an account, please click on the \"Sign Up Now\" link. Current as of: September 8, 2021               Content Version: 13.3  © 4611-8102 Healthwise, [employer]. Care instructions adapted under license by [facility] ([facility]). If you have questions about a medical condition or this instruction, always ask your healthcare professional. [address] any warranty or liability for your use of this information.

## 2022-07-13 NOTE — TELEPHONE ENCOUNTER
Met patient today and upon further review it was discovered patient is on a Beta Blocker Meoprolo Tartrate (Lopressor) and has been for some time. Informed patient my apologies upon review of his med list I should have informed him that we can not do Allergy Testing or Immunotherapy on patients taking Beta Blockers. I offered to give him the name of [address] [place] [address] in the area that does testing and Immunotherapy on patients with these meds but he stated he would like to make an appointment with Dr. Sebas William for Surgery (he was told that would be the next step). Patient was very kind and stated he understood and declined the phone number to another practice he wants to move on to Surgery. I reviewed Dr. Brayan uQezada schedule and he is booked but will reach out to see if we can get him in sooner and I will contact him with a date Dr. Sebas William approves.  Patient agreed to plan

## 2022-07-26 ENCOUNTER — OFFICE VISIT (OUTPATIENT)
Dept: ENT CLINIC | Age: 52
End: 2022-07-26
Payer: MEDICARE

## 2022-07-26 VITALS — TEMPERATURE: 97.7 F | DIASTOLIC BLOOD PRESSURE: 95 MMHG | HEART RATE: 93 BPM | SYSTOLIC BLOOD PRESSURE: 167 MMHG

## 2022-07-26 DIAGNOSIS — G43.809 OTHER MIGRAINE WITHOUT STATUS MIGRAINOSUS, NOT INTRACTABLE: Primary | ICD-10-CM

## 2022-07-26 DIAGNOSIS — Z91.89 AT RISK FOR OBSTRUCTIVE SLEEP APNEA: ICD-10-CM

## 2022-07-26 DIAGNOSIS — J34.3 HYPERTROPHY OF BOTH INFERIOR NASAL TURBINATES: ICD-10-CM

## 2022-07-26 DIAGNOSIS — J32.0 CHRONIC MAXILLARY SINUSITIS: ICD-10-CM

## 2022-07-26 DIAGNOSIS — J34.2 DNS (DEVIATED NASAL SEPTUM): ICD-10-CM

## 2022-07-26 PROCEDURE — G8427 DOCREV CUR MEDS BY ELIG CLIN: HCPCS | Performed by: STUDENT IN AN ORGANIZED HEALTH CARE EDUCATION/TRAINING PROGRAM

## 2022-07-26 PROCEDURE — 99213 OFFICE O/P EST LOW 20 MIN: CPT | Performed by: STUDENT IN AN ORGANIZED HEALTH CARE EDUCATION/TRAINING PROGRAM

## 2022-07-26 PROCEDURE — G8417 CALC BMI ABV UP PARAM F/U: HCPCS | Performed by: STUDENT IN AN ORGANIZED HEALTH CARE EDUCATION/TRAINING PROGRAM

## 2022-07-26 PROCEDURE — 1036F TOBACCO NON-USER: CPT | Performed by: STUDENT IN AN ORGANIZED HEALTH CARE EDUCATION/TRAINING PROGRAM

## 2022-07-26 PROCEDURE — 3017F COLORECTAL CA SCREEN DOC REV: CPT | Performed by: STUDENT IN AN ORGANIZED HEALTH CARE EDUCATION/TRAINING PROGRAM

## 2022-07-26 NOTE — PROGRESS NOTES
[address]  FOLLOW-UP VISIT      Patient Name: Ponce NICOLAS Record Number:  [id number]  Primary Care Physician:  Milana Phoenix, DO    ChiefComplaint     Chief Complaint   Patient presents with    Other     Discuss surgery        History of Present Illness     America Fernandez is an 49 y.o. male previously seen for allergic rhinitis, chronic maxillary sinusitis, mucous retention cyst of maxillary sinus, bilateral inferior turbinate hypertrophy, deviated nasal septum. Interval History:   He continues to have headaches localized to his forehead. Denies nasal congestion, mucopurulent drainage, decreased sense of smell. He was seen by Dr. Marylee Bow a couple years ago and was diagnosed with cerebellar tonsillar ectopia. He was referred to sleep medicine for sleep study but he never had this performed. He is getting headaches nearly every day, some days are worse than others, today it is quite miserable. He does snore at night and chokes in his sleep at times. He is tired throughout the day. Past Medical History     Past Medical History:   Diagnosis Date    Anxiety     Asthma 01/01/2000    late 20's-early 30s. Cerebellar tonsillar ectopia ([facility] [address] 5/4/2021    Class 1 obesity due to excess calories without serious comorbidity with body mass index (BMI) of 31.0 to 31.9 in adult 9/14/2020    Closed fracture of head of fifth metacarpal     Boxer's fracture    Former smoker 05/2019    started age 17, quit age 53    Fracture of finger of right hand     And knuckle    GI bleed 6/10/2019    History of blood transfusion     new this admission 6/10/19    Hypertension     Iron deficiency anemia due to chronic blood loss 10/31/2019    DUE TO GI BLEED/ULCERS    Lumbar disc disease 01/01/1994    L4-5 proteuding disc    Peptic ulcer 01/01/2015    EGD 3/2/2020: Distal circumferentialesophageal ulcer and distal Parra's.   Gastric body and antrum gastritis Seasonal allergic rhinitis        Past Surgical History     Past Surgical History:   Procedure Laterality Date    COLONOSCOPY  03/2015    1 benign polyp. [address] Due 0043-2048. ENDOSCOPY, COLON, DIAGNOSTIC      LUMBAR SPINE SURGERY  01/01/2002    epidural steroids x3    NASAL ENDOSCOPY  03/11/2020    UPPER GASTROINTESTINAL ENDOSCOPY  3/2015    3 ulcers - peptic and esophageal    UPPER GASTROINTESTINAL ENDOSCOPY  06/2019    +ulcers    UPPER GASTROINTESTINAL ENDOSCOPY N/A 06/11/2019    EGD DIAGNOSTIC ONLY performed by Pastor Katarina MD at [address] [place] [address],[facility]  08/19/2019    Dr. Jose Plata, ulcer healing in esophagitis, Parra's 1    UPPER GASTROINTESTINAL ENDOSCOPY N/A 01/06/2020    EGD CONTROL HEMORRHAGE performed by Alexandru Bryan MD at [address] [place] [address],[facility] N/A 01/06/2020    EGD SUBMUCOSAL/ INJECTION OF EPINEPHRINE performed by Alexandru Bryan MD at [address] [place] [address],[facility] N/A 03/02/2020    EGD BIOPSY performed by Charles Kuo MD at [address] [place] [address]  01/01/1983    and 1989       Family History     Family History   Problem Relation Age of Onset    Other Mother         gall bladder    Allergies Mother     Heart Attack Father 52        x3-4    Coronary Art Dis Father     Alcohol Abuse Father     Allergies Father     Other Brother         congenital calcium deposits n bones, COVID-19@ 48.     Allergies Brother     Hearing Loss Brother         congenital    Allergy (Severe) Brother     No Known Problems Brother         in a wreck as teen    Cancer Maternal Grandmother     Diabetes type 2  Maternal Grandmother     Cancer Maternal Grandfather     Asthma Paternal Grandfather     No Known Problems Son     No Known Problems Son     Cancer Maternal Aunt         brain    Other Maternal Aunt         lung issues    Heart Attack Paternal Aunt 46    Coronary Art Dis Paternal Aunt Cancer Paternal Aunt         ? lung? Lung Cancer Paternal Aunt     Lung Cancer Paternal Aunt     Lung Cancer Paternal Uncle        Social History     Social History     Tobacco Use    Smoking status: Former     Packs/day: 1.50     Years: 35.00     Pack years: 52.50     Types: Cigarettes     Start date: 1/1/1988     Quit date: 5/1/2019     Years since quitting: 3.2    Smokeless tobacco: Former     Types: Snuff    Tobacco comments:     started to smoke at 16 / smoked up to 1.5 ppd / smoked for 35 yrs on and off / 1 PPD (QUIT [id number])   Vaping Use    Vaping Use: Never used   Substance Use Topics    Alcohol use: No     Comment: rare alcohol    Drug use: Not Currently     Types: Marijuana Ivelisse Diaz)     Comment: was daily.  None since 9/2018        Allergies     No Known Allergies    Medications     Current Outpatient Medications   Medication Sig Dispense Refill    pantoprazole (PROTONIX) 40 MG tablet Take 1 tablet by mouth twice daily 180 tablet 0    lisinopril (PRINIVIL;ZESTRIL) 20 MG tablet Take 1/2 (one-half) tablet by mouth once daily 45 tablet 0    azelastine (ASTELIN) 0.1 % nasal spray 1 spray by Nasal route 2 times daily Use in each nostril as directed 30 mL 5    montelukast (SINGULAIR) 10 MG tablet Take 1 tablet by mouth nightly 30 tablet 11    fluticasone (FLONASE) 50 MCG/ACT nasal spray 2 sprays by Each Nostril route daily 48 g 1    metoprolol tartrate (LOPRESSOR) 25 MG tablet Take 1 tablet by mouth twice daily 180 tablet 1    Omega-3 Fatty Acids (OMEGA 3 PO) Take 1 capsule by mouth every morning (before breakfast) Indications: Decreased HDL Cholesterol      budesonide-formoterol (SYMBICORT) 160-4.5 MCG/ACT AERO Inhale 2 puffs into the lungs 2 times daily 3 Inhaler 5    Zinc Acetate-Sweetleaf (FP ZINC LOZENGES PO) Take 0.5 lozenges by mouth 2 times daily       vitamin C (ASCORBIC ACID) 500 MG tablet Take 500 mg by mouth 2 times daily      Cholecalciferol (VITAMIN D3) 50 MCG (2000 UT) CAPS Take 1 capsule by mouth daily       Multiple Vitamins-Minerals (THERAPEUTIC MULTIVITAMIN-MINERALS) tablet Take 1 tablet by mouth daily      clonazePAM (KLONOPIN) 0.5 MG tablet Take 1 tablet by mouth daily for 90 days. 30 tablet 2    albuterol sulfate HFA (PROVENTIL HFA) 108 (90 Base) MCG/ACT inhaler Inhale 2 puffs into the lungs every 4 hours as needed for Wheezing or Shortness of Breath Please provide pt c spacer as well 1 Inhaler 5     No current facility-administered medications for this visit. Review of Systems     REVIEW OF SYSTEM: See HPI above    PhysicalExam     Vitals:    07/26/22 1428   BP: (!) 167/95   Site: Right Upper Arm   Position: Sitting   Cuff Size: Large Adult   Pulse: 93   Temp: 97.7 °F (36.5 °C)   TempSrc: Temporal       PHYSICAL EXAM  BP (!) 167/95 (Site: Right Upper Arm, Position: Sitting, Cuff Size: Large Adult)   Pulse 93   Temp 97.7 °F (36.5 °C) (Temporal)     GENERAL: No acute distress, alert and oriented. EYES: EOMI, Anti-icteric. NOSE: On anterior rhinoscopy there is no epistaxis, nasal mucosa moist and normal appearing, no purulent drainage. Nasal septum deviated to the left. Bilateral inferior turbinates hypertrophied.   EARS: Normal external appearance; on portable otomicroscopy:     -Ad: External auditory canal without stenosis, tympanic membrane clear, no middle ear effusions or retractions     -As: External auditory canal without stenosis, tympanic membrane clear, no middle ear effusions or retractions  FACE: HB 1/6 bilaterally, symmetric appearing, sensation equal bilaterally  ORAL CAVITY: No masses or lesions visualized or palpated, uvula is midline, moist mucous membranes, no oropharyngeal masses or oropharyngeal obstruction  NECK: Normal range of motion, no thyromegaly, trachea is midline, no palpable lymphadenopathy or neck masses, no crepitus  CHEST: Normal respiratory effort, breathing comfortably, no retractions  SKIN: No rashes, normal appearing skin, no evidence of skin lesions/tumors  NEURO: Cranial Nerves 2, 3, 4, 5, 6, 7, 11, 12 grossly intact bilaterally    I have performed a head and neck physical exam personally or was physically present during the key or critical portions of the service. Data/Imaging Review     EXAMINATION:   CT OF THE SINUS WITHOUT CONTRAST 6/23/2022 6:20 am       TECHNIQUE:   CT of the sinuses was performed without the administration of intravenous   contrast. Multiplanar reformatted images are provided for review. Automated   exposure control, iterative reconstruction, and/or weight based adjustment of   the mA/kV was utilized to reduce the radiation dose to as low as reasonably   achievable. COMPARISON:   None       HISTORY:   ORDERING SYSTEM PROVIDED HISTORY: Chronic sinusitis, unspecified location   TECHNOLOGIST PROVIDED HISTORY:   Reason for Exam: Dx: Chronic sinusitis, unspecified location J32.9   (ICD-10-CM); Facial pressure R44.8 (ICD-10-CM)   Relevant Medical/Surgical History: Dx: Chronic sinusitis, unspecified   location J32.9 (ICD-10-CM); Facial pressure R44.8 (ICD-10-CM)       FINDINGS:   RIGHT OMC: There is mucosal thickening of the right ostiomeatal unit. The   frontoethmoidal recess is patent. In the inferior half of the left maxillary   sinus there is an opacity with superiorly smooth convex contour. There is   mucosal thickening of multiple ethmoid air cells in the lower frontal sinus. LEFT OMC: The ostiomeatal complex and frontal recess are patent. There is   mild mucosal thickening of scattered ethmoid air cells in the frontal sinus. SPHENOETHMOID: The sphenoethmoidal recesses are patent, and the bilateral   posterior ethmoid and sphenoid sinuses are clear. Sphenoid pneumatization is   sellar and does not extend into the anterior clinoid processes. The optic   nerve and carotid canals are not dehiscent. The ethmoid roofs are symmetric. NASAL CAVITY: Tootie bullosa is noted bilaterally.        OTHER: The mastoid air cells are well aerated bilaterally. Impression   Right ostiomeatal unit pattern of disease. The right maxillary sinus is   moderately opacified. Retention cyst is alternatively possible. Mild   inflammatory disease of the ethmoid and frontal sinuses. Mild inflammatory disease of the left ethmoid and frontal sinuses. Images from CT scan sinus without contrast performed on 6/23/2022 independent reviewed which demonstrates opacification of the right [facility] and greater than 50% opacification of the left maxillary sinus. Bilateral charli bullosa present. Trace mucosal thickening within the right ethmoid sinus. Otherwise paranasal sinuses appear well aerated. Assessment and Plan     1. Other migraine without status migrainosus, not intractable  -His main symptom is forehead pressure and pain consistent with possible migraines. He does have some chronic sinus disease localized to his left maxillary and ethmoid sinuses but other than facial pressure does not have any other cardinal symptoms of chronic sinus disease. He is unable to have skin allergy testing or undergo allergy immunotherapy secondary to being on a beta-blocker. I do think that his headaches and forehead pressure may be related more to underlying untreated migraines. It has been a couple years since he has seen Dr. Anna Zhang and I have placed a referral back to see Dr. Anna Zhang to see if he may benefit from any type of migraine treatment. We could consider functional scopic sinus surgery in the future but would like to hold off on this for now and use it as a last resort as I am not completely sure that addressing his sinuses surgically will alleviate all of his symptoms. If his symptoms are present despite evaluation and possible treatment by Dr. Anna Zhang we can consider sinus surgery in the future.     2. At risk for obstructive sleep apnea  -He was recommended to have a sleep study in the past by Dr. Anna Zhang which she never followed through with. I have placed another referral to Dr. Pratibha Olguin. - Steven Sims MD, Sleep Medicine, [facility]    3. Chronic maxillary sinusitis  4. Hypertrophy of both inferior nasal turbinates  5. DNS (deviated nasal septum)  -Continue Astelin, Flonase, singular      Follow-Up     Return if symptoms worsen or fail to improve. Dr. [place] [id number]  Department of Otolaryngology/Head and Neck Surgery  7/26/22    Medical Decision Making: The following items were considered in medical decision making:  Independent review of images  Review / order clinical lab tests  Review / order radiology tests  Decision to obtain old records      This note was generated completely or in part utilizing Dragon dictation speech recognition software. Occasionally, words are mistranscribed and despite editing, the text may contain inaccuracies due to incorrect word recognition. If further clarification is needed please contact the office at [phone].

## 2022-08-02 DIAGNOSIS — I10 ESSENTIAL HYPERTENSION: ICD-10-CM

## 2022-08-29 ENCOUNTER — OFFICE VISIT (OUTPATIENT)
Dept: NEUROLOGY | Age: 52
End: 2022-08-29
Payer: MEDICARE

## 2022-08-29 VITALS
HEART RATE: 68 BPM | SYSTOLIC BLOOD PRESSURE: 139 MMHG | WEIGHT: 222 LBS | HEIGHT: 72 IN | BODY MASS INDEX: 30.07 KG/M2 | DIASTOLIC BLOOD PRESSURE: 79 MMHG

## 2022-08-29 DIAGNOSIS — Q04.8 CEREBELLAR TONSILLAR ECTOPIA (HCC): Primary | ICD-10-CM

## 2022-08-29 DIAGNOSIS — G43.009 MIGRAINE WITHOUT AURA AND WITHOUT STATUS MIGRAINOSUS, NOT INTRACTABLE: ICD-10-CM

## 2022-08-29 DIAGNOSIS — G47.33 OBSTRUCTIVE SLEEP APNEA: ICD-10-CM

## 2022-08-29 PROCEDURE — 99214 OFFICE O/P EST MOD 30 MIN: CPT | Performed by: PSYCHIATRY & NEUROLOGY

## 2022-08-29 PROCEDURE — 1036F TOBACCO NON-USER: CPT | Performed by: PSYCHIATRY & NEUROLOGY

## 2022-08-29 PROCEDURE — G8417 CALC BMI ABV UP PARAM F/U: HCPCS | Performed by: PSYCHIATRY & NEUROLOGY

## 2022-08-29 PROCEDURE — G8427 DOCREV CUR MEDS BY ELIG CLIN: HCPCS | Performed by: PSYCHIATRY & NEUROLOGY

## 2022-08-29 PROCEDURE — 3017F COLORECTAL CA SCREEN DOC REV: CPT | Performed by: PSYCHIATRY & NEUROLOGY

## 2022-08-29 RX ORDER — TOPIRAMATE 25 MG/1
TABLET ORAL
Qty: 60 TABLET | Refills: 1 | Status: SHIPPED | OUTPATIENT
Start: 2022-08-29 | End: 2022-10-31 | Stop reason: SDUPTHER

## 2022-08-29 NOTE — PROGRESS NOTES
[facility]   Neurology followup    Subjective:   CC/HP  History was obtained from the patient. This is a patient that I had seen in the past for dizziness and headaches. I suspected sleep apnea but patient never got the sleep study done. She has since then seen Dr. Ajay Bonilla from ENT who felt that this could be migraines and wanted him reevaluated. Patient's symptoms are unchanged from the previous visit. Background history:  Patient was referred for evaluation of pain in the occipital region with spread to the front of the head. He also gets somewhat dizzy and lightheaded with this. He states he has had previous vertigo but no vertigo associated with the symptoms. Symptoms started about 2 years ago. Onset was gradual.  Symptoms are slowly progressive and now he gets them almost on a daily basis. Patient states that he was treated with antibiotics and steroids by ENT which seemed to help some for a few months but then the symptoms came back. Patient has had an MRI brain and CT scans done. Patient quit smoking in 2019. No other focal neurological symptoms  Patient snores during sleep. He does not feel rested when he wakes up in the morning and if daytime sleepiness. He wakes up feeling breathless in the middle of the night. REVIEW OF SYSTEMS    Constitutional:  []   Chills   []  Fatigue   []  Fevers   []  Malaise   []  Weight loss     [x] Denies all of the above    Respiratory:   []  Cough    []  Shortness of breath         [x] Denies all of the above     Cardiovascular:   []  Chest pain    []  Exertional chest pressure/discomfort           [] Palpitations    []  Syncope     [x] Denies all of the above        Past Medical History:   Diagnosis Date    Anxiety     Asthma 01/01/2000    late 20's-early 30s.     Cerebellar tonsillar ectopia ([facility]) 5/4/2021    Class 1 obesity due to excess calories without serious comorbidity with body mass index (BMI) of 31.0 to 31.9 in adult 9/14/2020    Closed fracture of head of fifth metacarpal     Boxer's fracture    Former smoker 05/2019    started age 17, quit age 53    Fracture of finger of right hand     And knuckle    GI bleed 6/10/2019    History of blood transfusion     new this admission 6/10/19    Hypertension     Iron deficiency anemia due to chronic blood loss 10/31/2019    DUE TO GI BLEED/ULCERS    Lumbar disc disease 01/01/1994    L4-5 proteuding disc    Peptic ulcer 01/01/2015    EGD 3/2/2020: Distal circumferentialesophageal ulcer and distal Parra's. Gastric body and antrum gastritis    Seasonal allergic rhinitis      Family History   Problem Relation Age of Onset    Other Mother         gall bladder    Allergies Mother     Heart Attack Father 52        x3-4    Coronary Art Dis Father     Alcohol Abuse Father     Allergies Father     Other Brother         congenital calcium deposits n bones, COVID-19@ 48. Allergies Brother     Hearing Loss Brother         congenital    Allergy (Severe) Brother     No Known Problems Brother         in a wreck as teen    Cancer Maternal Grandmother     Diabetes type 2  Maternal Grandmother     Cancer Maternal Grandfather     Asthma Paternal Grandfather     No Known Problems Son     No Known Problems Son     Cancer Maternal Aunt         brain    Other Maternal Aunt         lung issues    Heart Attack Paternal Aunt 46    Coronary Art Dis Paternal Aunt     Cancer Paternal Aunt         ? lung?     Lung Cancer Paternal Aunt     Lung Cancer Paternal Aunt     Lung Cancer Paternal Uncle      Social History     Socioeconomic History    Marital status: Single     Spouse name: None    Number of children: 2    Years of education: 12    Highest education level: High school graduate   Occupational History    Occupation: FACTORY IN PAST     Comment: unemployed 11/2018   Tobacco Use    Smoking status: Former     Packs/day: 1.50     Years: 35.00     Pack years: 52.50     Types: Cigarettes     Start date: 1/1/1988     Quit date: 5/1/2019     Years since quitting: 3.3    Smokeless tobacco: Former     Types: Snuff    Tobacco comments:     started to smoke at 16 / smoked up to 1.5 ppd / smoked for 35 yrs on and off / 1 PPD (QUIT [id number])   Vaping Use    Vaping Use: Never used   Substance and Sexual Activity    Alcohol use: No     Comment: rare alcohol    Drug use: Not Currently     Types: Marijuana Birder Sails)     Comment: was daily. None since 9/2018    Sexual activity: Yes   Social History Narrative    Exercise - too light headed. 3/26/19. None. 1/15/20. Walks every am about 1.5 mi 7 days/wk. 4/14/20 2.3 mi every day. 5/4/20. Walks 2 mi/day. 7/15/20. Was having anxiety attacks while walking his dog so stopped for 2 months. 10/6/20. Walking 1.5 mi 7 day/wk. 8/24/21. 1.5-1.75 mi/day x7 day/wk. 12/7/21. Also Does 90 min 7 days core body. 3/8/22. Walks 2 mi/day 7 day/wk. 7/11/22. Social Determinants of Health     Financial Resource Strain: Unknown    Difficulty of Paying Living Expenses: Patient refused   Food Insecurity: Unknown    Worried About Running Out of Food in the Last Year: Patient refused    Ran Out of Food in the Last Year: Patient refused   Transportation Needs: Unknown    Lack of Transportation (Medical): Patient refused    Lack of Transportation (Non-Medical): Patient refused        Objective:  Exam:  /79   Pulse 68   Ht 6' (1.829 m)   Wt 222 lb (100.7 kg)   BMI 30.11 kg/m²   This is a well-nourished patient in no acute distress  Patient is awake, alert and oriented x3. Speech is normal.  Pupils are equal round reacting to light. Extraocular movements intact. Face symmetrical. Tongue midline. Motor exam shows normal symmetrical strength. Deep tendon reflexes normal. Plantar reflexes downgoing. Sensory exam normal. Coordination normal. Gait normal. No carotid bruit. No neck stiffness.       Data :  LABS:  General Labs:  CBC:   Lab Results   Component Value Date/Time    WBC 8.8 03/08/2022 08:53 AM    RBC 5.37 03/08/2022 08:53 AM    HGB 15.7 03/08/2022 08:53 AM    HCT 47.1 03/08/2022 08:53 AM    MCV 87.6 03/08/2022 08:53 AM    MCH 29.2 03/08/2022 08:53 AM    MCHC 33.4 03/08/2022 08:53 AM    RDW 13.7 03/08/2022 08:53 AM     03/08/2022 08:53 AM    MPV 8.4 03/08/2022 08:53 AM     BMP:    Lab Results   Component Value Date/Time     03/08/2022 08:53 AM    K 5.2 03/08/2022 08:53 AM    K 3.9 01/07/2020 07:34 AM     03/08/2022 08:53 AM    CO2 24 03/08/2022 08:53 AM    BUN 18 03/08/2022 08:53 AM    LABALBU 4.8 03/08/2022 08:53 AM    CREATININE 1.0 03/08/2022 08:53 AM    CALCIUM 10.0 03/08/2022 08:53 AM    GFRAA >60 03/08/2022 08:53 AM    LABGLOM >60 03/08/2022 08:53 AM    GLUCOSE 97 03/08/2022 08:53 AM     RADIOLOGY REVIEW: MRI brain    Impression :  Mild tonsillar ectopia, probably asymptomatic  Probable obstructive sleep apnea  There is a concern that this could be possibly migraine type headaches    Plan :  Discussed with patient at length  I will start him on low-dose topiramate and see if it helps any  Patient is not eager to get the sleep study done now but states that he will consider it later. I will see him back in 2 months for follow-up. Please note a portion of  this chart was generated using dragon dictation software. Although every effort was made to ensure the accuracy of this automated transcription, some errors in transcription may have occurred.

## 2022-08-30 DIAGNOSIS — I10 ESSENTIAL HYPERTENSION: ICD-10-CM

## 2022-08-31 RX ORDER — LISINOPRIL 20 MG/1
TABLET ORAL
Qty: 45 TABLET | Refills: 0 | Status: SHIPPED | OUTPATIENT
Start: 2022-08-31

## 2022-09-05 DIAGNOSIS — K22.10 ULCER OF ESOPHAGUS WITHOUT BLEEDING: ICD-10-CM

## 2022-09-06 RX ORDER — PANTOPRAZOLE SODIUM 40 MG/1
TABLET, DELAYED RELEASE ORAL
Qty: 180 TABLET | Refills: 0 | Status: SHIPPED | OUTPATIENT
Start: 2022-09-06

## 2022-09-21 ENCOUNTER — HOSPITAL ENCOUNTER (EMERGENCY)
Age: 52
Discharge: HOME | End: 2022-09-21
Payer: MEDICARE

## 2022-09-21 VITALS
RESPIRATION RATE: 18 BRPM | HEART RATE: 71 BPM | OXYGEN SATURATION: 98 % | SYSTOLIC BLOOD PRESSURE: 162 MMHG | TEMPERATURE: 98.3 F | DIASTOLIC BLOOD PRESSURE: 83 MMHG | HEIGHT: 72 IN | WEIGHT: 221.2 LBS | BODY MASS INDEX: 29.96 KG/M2

## 2022-09-21 DIAGNOSIS — J32.1 CHRONIC FRONTAL SINUSITIS: Primary | ICD-10-CM

## 2022-09-21 PROCEDURE — 99283 EMERGENCY DEPT VISIT LOW MDM: CPT

## 2022-09-21 PROCEDURE — 6370000000 HC RX 637 (ALT 250 FOR IP): Performed by: PHYSICIAN ASSISTANT

## 2022-09-21 RX ORDER — PREDNISONE 10 MG/1
TABLET ORAL
Qty: 30 TABLET | Refills: 0 | Status: SHIPPED | OUTPATIENT
Start: 2022-09-21 | End: 2022-10-01

## 2022-09-21 RX ORDER — PREDNISONE 20 MG/1
60 TABLET ORAL ONCE
Status: COMPLETED | OUTPATIENT
Start: 2022-09-21 | End: 2022-09-21

## 2022-09-21 RX ORDER — DOXYCYCLINE HYCLATE 100 MG
100 TABLET ORAL 2 TIMES DAILY
Qty: 20 TABLET | Refills: 0 | Status: SHIPPED | OUTPATIENT
Start: 2022-09-21 | End: 2022-10-01

## 2022-09-21 RX ADMIN — PREDNISONE 60 MG: 20 TABLET ORAL at 08:27

## 2022-09-21 ASSESSMENT — PAIN - FUNCTIONAL ASSESSMENT
PAIN_FUNCTIONAL_ASSESSMENT: NONE - DENIES PAIN
PAIN_FUNCTIONAL_ASSESSMENT: 0-10

## 2022-09-21 ASSESSMENT — PAIN DESCRIPTION - LOCATION: LOCATION: HEAD

## 2022-09-21 NOTE — ED PROVIDER NOTES
[address] [facility]        Pt Name: Stefanie Nolasco  MRN: [id number]  [place] 1970  Date of evaluation: 9/21/2022  Provider: Manpreet Dash PA-C  PCP: Lucio Mccormack DO  Note Started: 8:27 AM EDT       MYRTLE. I have evaluated this patient. My supervising physician was available for consultation. CHIEF COMPLAINT       Chief Complaint   Patient presents with    Sinusitis     Forehead brow infection since march, denies pain. \"Feels like crap in his head and no one is helping me. \" Multiple doctors and the only relieve he gets is when he sleeps. \"Eyes drop but eyes run all the time\". HISTORY OF PRESENT ILLNESS   (Location, Timing/Onset, Context/Setting, Quality, Duration, Modifying Factors, Severity, Associated Signs and Symptoms)  Note limiting factors. Chief Complaint: Sinus pressure    Stefanie Nolasco is a 49 y.o. male who presents to the emergency department the chief complaint of sinus pressure behind his eyebrows. He states if he turns his head he can feel the fluid moving in his head. This is been ongoing for least 6 months. He states he has had intermittent issues with this over the past 4 years and saw ENT in the past a few years ago and was on multiple rounds of antibiotics and finally it cleared up until began about in April again. He has been reviewing records on Zithromax, Bactrim and Augmentin along with a Medrol Dosepak. He has not been on any antibiotics recently. Has been seen by ENT and neurology. Recently placed on Topamax by neurology. He states he is not having any relief. He states that it is really not painful he just feels like he is \"in misery. \"  Denies visual changes, difficulty moving his extremities, nausea, vomiting, fevers, neck stiffness.   Had MRI in the past that revealed cerebral tonsillar ectopia and recently had CT that did reveal some right maxillary sinus that was opacified with retention cyst and some mild inflammatory disease of the ethmoid and frontal sinuses. Nursing Notes were all reviewed and agreed with or any disagreements were addressed in the HPI. REVIEW OF SYSTEMS    (2-9 systems for level 4, 10 or more for level 5)     Review of Systems    Positives and Pertinent negatives as per HPI. Except as noted above in the ROS, all other systems were reviewed and negative. PAST MEDICAL HISTORY     Past Medical History:   Diagnosis Date    Anxiety     Asthma 01/01/2000    late 20's-early 30s. Cerebellar tonsillar ectopia ([facility] [address] 5/4/2021    Class 1 obesity due to excess calories without serious comorbidity with body mass index (BMI) of 31.0 to 31.9 in adult 9/14/2020    Closed fracture of head of fifth metacarpal     Boxer's fracture    Former smoker 05/2019    started age 17, quit age 53    Fracture of finger of right hand     And knuckle    GI bleed 6/10/2019    History of blood transfusion     new this admission 6/10/19    Hypertension     Iron deficiency anemia due to chronic blood loss 10/31/2019    DUE TO GI BLEED/ULCERS    Lumbar disc disease 01/01/1994    L4-5 proteuding disc    Peptic ulcer 01/01/2015    EGD 3/2/2020: Distal circumferentialesophageal ulcer and distal Parra's. Gastric body and antrum gastritis    Seasonal allergic rhinitis          SURGICAL HISTORY     Past Surgical History:   Procedure Laterality Date    COLONOSCOPY  03/2015    1 benign polyp. Fernando/Clyde Ricketts. Due 3765-7687.     ENDOSCOPY, COLON, DIAGNOSTIC      LUMBAR SPINE SURGERY  01/01/2002    epidural steroids x3    NASAL ENDOSCOPY  03/11/2020    UPPER GASTROINTESTINAL ENDOSCOPY  3/2015    3 ulcers - peptic and esophageal    UPPER GASTROINTESTINAL ENDOSCOPY  06/2019    +ulcers    UPPER GASTROINTESTINAL ENDOSCOPY N/A 06/11/2019    EGD DIAGNOSTIC ONLY performed by Roger Patel MD at [place] [address]  08/19/2019    Dr. Amauri Ryan, [facility], ulcer healing in esophagitis, Parra's 1    UPPER GASTROINTESTINAL ENDOSCOPY N/A 01/06/2020    EGD CONTROL HEMORRHAGE performed by Alexandru Bryan MD at [address] [place] [address],[facility] 01/06/2020    EGD SUBMUCOSAL/ INJECTION OF EPINEPHRINE performed by Alexandru Bryan MD at [address] [place] [address],[facility] 03/02/2020    EGD BIOPSY performed by Charles Kuo MD at [address] [place] [address]  01/01/1983    and [address] [place] [address]       Previous Medications    ALBUTEROL SULFATE HFA (PROVENTIL HFA) 108 (90 BASE) MCG/ACT INHALER    Inhale 2 puffs into the lungs every 4 hours as needed for Wheezing or Shortness of Breath Please provide pt c spacer as well    AZELASTINE (ASTELIN) 0.1 % NASAL SPRAY    1 spray by Nasal route 2 times daily Use in each nostril as directed    BUDESONIDE-FORMOTEROL (SYMBICORT) 160-4.5 MCG/ACT AERO    Inhale 2 puffs into the lungs 2 times daily    CHOLECALCIFEROL (VITAMIN D3) 50 MCG (2000 UT) CAPS    Take 1 capsule by mouth daily     CLONAZEPAM (KLONOPIN) 0.5 MG TABLET    Take 1 tablet by mouth daily for 90 days.     FLUTICASONE (FLONASE) 50 MCG/ACT NASAL SPRAY    2 sprays by Each Nostril route daily    LISINOPRIL (PRINIVIL;ZESTRIL) 20 MG TABLET    Take 1/2 (one-half) tablet by mouth once daily    METOPROLOL TARTRATE (LOPRESSOR) 25 MG TABLET    Take 1 tablet by mouth twice daily    MONTELUKAST (SINGULAIR) 10 MG TABLET    Take 1 tablet by mouth nightly    MULTIPLE VITAMINS-MINERALS (THERAPEUTIC MULTIVITAMIN-MINERALS) TABLET    Take 1 tablet by mouth daily    OMEGA-3 FATTY ACIDS (OMEGA 3 PO)    Take 1 capsule by mouth every morning (before breakfast) Indications: Decreased HDL Cholesterol    PANTOPRAZOLE (PROTONIX) 40 MG TABLET    Take 1 tablet by mouth twice daily    TOPIRAMATE (TOPAMAX) 25 MG TABLET    Take 1 tablet daily for 1 week and then 1 tablet by mouth twice daily    VITAMIN C (ASCORBIC ACID) 500 MG TABLET    Take 500 mg by mouth 2 times daily    ZINC ACETATE-SWEETLEAF (FP ZINC LOZENGES PO)    Take 0.5 lozenges by mouth 2 times daily          ALLERGIES     Patient has no known allergies. FAMILYHISTORY       Family History   Problem Relation Age of Onset    Other Mother         gall bladder    Allergies Mother     Heart Attack Father 52        x3-4    Coronary Art Dis Father     Alcohol Abuse Father     Allergies Father     Other Brother         congenital calcium deposits n bones, COVID-19@ 48. Allergies Brother     Hearing Loss Brother         congenital    Allergy (Severe) Brother     No Known Problems Brother         in a wreck as teen    Cancer Maternal Grandmother     Diabetes type 2  Maternal Grandmother     Cancer Maternal Grandfather     Asthma Paternal Grandfather     No Known Problems Son     No Known Problems Son     Cancer Maternal Aunt         brain    Other Maternal Aunt         lung issues    Heart Attack Paternal Aunt 46    Coronary Art Dis Paternal Aunt     Cancer Paternal Aunt         ? lung? Lung Cancer Paternal Aunt     Lung Cancer Paternal Aunt     Lung Cancer Paternal Uncle           SOCIAL HISTORY       Social History     Tobacco Use    Smoking status: Former     Packs/day: 1.50     Years: 35.00     Pack years: 52.50     Types: Cigarettes     Start date: 1/1/1988     Quit date: 5/1/2019     Years since quitting: 3.3    Smokeless tobacco: Former     Types: Snuff    Tobacco comments:     started to smoke at 16 / smoked up to 1.5 ppd / smoked for 35 yrs on and off / 1 PPD (QUIT 3456-8337)   Vaping Use    Vaping Use: Never used   Substance Use Topics    Alcohol use: No     Comment: rare alcohol    Drug use: Not Currently     Types: Marijuana Richie Semen)     Comment: was daily.  None since 9/2018       SCREENINGS    [place] Coma Scale  Eye Opening: Spontaneous  Best Verbal Response: Oriented  Best Motor Response: Obeys commands  [place] Coma Scale Score: 15        PHYSICAL EXAM    (up to 7 for level 4, 8 or more for level 5)     ED Triage Vitals   BP Temp Temp Source Heart Rate Resp SpO2 Height Weight   09/21/22 0753 09/21/22 0753 09/21/22 0753 09/21/22 0753 09/21/22 0753 09/21/22 0753 09/21/22 0755 09/21/22 0755   (!) 162/83 98.3 °F (36.8 °C) Oral 71 18 98 % 6' (1.829 m) 221 lb 3.2 oz (100.3 kg)       Physical Exam  Vitals and nursing note reviewed. Constitutional:       Appearance: He is well-developed. He is not diaphoretic. HENT:      Head: Atraumatic. Nose: Nose normal.      Mouth/Throat:      Mouth: Mucous membranes are moist.      Pharynx: No oropharyngeal exudate or posterior oropharyngeal erythema. Eyes:      General:         Right eye: No discharge. Left eye: No discharge. Extraocular Movements: Extraocular movements intact. Conjunctiva/sclera: Conjunctivae normal.      Pupils: Pupils are equal, round, and reactive to light. Cardiovascular:      Rate and Rhythm: Normal rate and regular rhythm. Heart sounds: No murmur heard. No friction rub. No gallop. Pulmonary:      Effort: Pulmonary effort is normal. No respiratory distress. Breath sounds: No stridor. No wheezing, rhonchi or rales. Abdominal:      General: Bowel sounds are normal. There is no distension. Palpations: Abdomen is soft. There is no mass. Tenderness: There is no abdominal tenderness. There is no guarding or rebound. Hernia: No hernia is present. Musculoskeletal:         General: No swelling. Normal range of motion. Cervical back: Normal range of motion. No rigidity. Skin:     General: Skin is warm and dry. Findings: No erythema or rash. Neurological:      Mental Status: He is alert and oriented to person, place, and time. Cranial Nerves: No cranial nerve deficit. Psychiatric:         Behavior: Behavior normal.       DIAGNOSTIC RESULTS   LABS:    Labs Reviewed - No data to display    When ordered only abnormal lab results are displayed.  All other labs were within normal range or not returned as of this dictation. EKG: When ordered, EKG's are interpreted by the Emergency Department Physician in the absence of a cardiologist.  Please see their note for interpretation of EKG. RADIOLOGY:   Non-plain film images such as CT, Ultrasound and MRI are read by the radiologist. Plain radiographic images are visualized and preliminarily interpreted by the ED Provider with the below findings:        Interpretation per the Radiologist below, if available at the time of this note:    No orders to display     No results found. PROCEDURES   Unless otherwise noted below, none     Procedures    CRITICAL CARE TIME       CONSULTS:  None      EMERGENCY DEPARTMENT COURSE and DIFFERENTIAL DIAGNOSIS/MDM:   Vitals:    Vitals:    09/21/22 0753 09/21/22 0755   BP: (!) 162/83    Pulse: 71    Resp: 18    Temp: 98.3 °F (36.8 °C)    TempSrc: Oral    SpO2: 98%    Weight:  221 lb 3.2 oz (100.3 kg)   Height:  6' (1.829 m)       Patient was given the following medications:  Medications   predniSONE (DELTASONE) tablet 60 mg (has no administration in time range)         Is this patient to be included in the SEP-1 Core Measure due to severe sepsis or septic shock? No   Exclusion criteria - the patient is NOT to be included for SEP-1 Core Measure due to:  2+ SIRS criteria are not met    Patient presented with some sinus pressure. Had some mild inflammatory disease on CT in June. Has been seen by ENT and neurology for this. He is nontoxic in appearance. Low suspicion for subarachnoid hemorrhage, meningitis, encephalitis, thrombosis, mass lesion or other emergent etiology. We will try another round of antibiotics and steroids but he is educated this may not help and he really needs a follow-up with the specialist that he is already been seen. He states he has another appointment with neurology already scheduled. Do not believe any further imaging or testing is warranted at this time.   Follow-up as an outpatient return here for any worsening of symptoms or problems at home. FINAL IMPRESSION      1.  Chronic frontal sinusitis          DISPOSITION/PLAN   DISPOSITION Decision To Discharge 09/21/2022 08:23:03 AM      PATIENT REFERRED TO:  Constance Delarosa [address]  [phone]    Schedule an appointment as soon as possible for a visit in 3 days  For re-check      DISCHARGE MEDICATIONS:  New Prescriptions    DOXYCYCLINE HYCLATE (VIBRA-TABS) 100 MG TABLET    Take 1 tablet by mouth 2 times daily for 10 days    PREDNISONE (DELTASONE) 10 MG TABLET    5 tabs po qam for 2 days then 4,3,2,1 tabs qam for 2 days each total of 10 days       DISCONTINUED MEDICATIONS:  Discontinued Medications    No medications on file              (Please note that portions of this note were completed with a voice recognition program.  Efforts were made to edit the dictations but occasionally words are mis-transcribed.)    Peggy Gates PA-C (electronically signed)            Peggy Gates PA-C  09/21/22 0287

## 2022-09-21 NOTE — ED NOTES
Pt states \"I take all the same medications that are prescribed to me in the computer, I do what everyone tells me to do and I just don't think anyone is taking me serious with the infection. \"     Carola Arcos RN  09/21/22 9074

## 2022-09-29 ENCOUNTER — PATIENT MESSAGE (OUTPATIENT)
Dept: FAMILY MEDICINE CLINIC | Age: 52
End: 2022-09-29

## 2022-09-29 DIAGNOSIS — R42 EPISODIC LIGHTHEADEDNESS: Primary | ICD-10-CM

## 2022-09-29 NOTE — TELEPHONE ENCOUNTER
From: Duglas Taylor  To: Dr. Britany Ballesteros: 9/29/2022 12:37 PM EDT  Subject: MRI    Dr. Nia Silva    This issue In my head that I have been suffering from for 7 months now is progressively getting worse. I am reaching out to you begging and pleading for you to order an MRI of my head, I am in so much misery and stress over this. Please help me.     Odell Garcia

## 2022-10-17 ENCOUNTER — OFFICE VISIT (OUTPATIENT)
Dept: FAMILY MEDICINE CLINIC | Age: 52
End: 2022-10-17
Payer: MEDICARE

## 2022-10-17 VITALS
SYSTOLIC BLOOD PRESSURE: 136 MMHG | BODY MASS INDEX: 29.66 KG/M2 | HEIGHT: 72 IN | WEIGHT: 219 LBS | DIASTOLIC BLOOD PRESSURE: 74 MMHG

## 2022-10-17 DIAGNOSIS — Q04.8 CEREBELLAR TONSILLAR ECTOPIA (HCC): ICD-10-CM

## 2022-10-17 DIAGNOSIS — J30.1 NON-SEASONAL ALLERGIC RHINITIS DUE TO POLLEN: ICD-10-CM

## 2022-10-17 DIAGNOSIS — H93.13 TINNITUS OF BOTH EARS: Chronic | ICD-10-CM

## 2022-10-17 DIAGNOSIS — R42 EPISODIC LIGHTHEADEDNESS: Primary | ICD-10-CM

## 2022-10-17 DIAGNOSIS — F41.9 ANXIETY: ICD-10-CM

## 2022-10-17 DIAGNOSIS — I10 ESSENTIAL HYPERTENSION: ICD-10-CM

## 2022-10-17 DIAGNOSIS — G47.33 OBSTRUCTIVE SLEEP APNEA SYNDROME: ICD-10-CM

## 2022-10-17 PROCEDURE — G8417 CALC BMI ABV UP PARAM F/U: HCPCS | Performed by: FAMILY MEDICINE

## 2022-10-17 PROCEDURE — 3074F SYST BP LT 130 MM HG: CPT | Performed by: FAMILY MEDICINE

## 2022-10-17 PROCEDURE — G8427 DOCREV CUR MEDS BY ELIG CLIN: HCPCS | Performed by: FAMILY MEDICINE

## 2022-10-17 PROCEDURE — 3017F COLORECTAL CA SCREEN DOC REV: CPT | Performed by: FAMILY MEDICINE

## 2022-10-17 PROCEDURE — 1036F TOBACCO NON-USER: CPT | Performed by: FAMILY MEDICINE

## 2022-10-17 PROCEDURE — G8484 FLU IMMUNIZE NO ADMIN: HCPCS | Performed by: FAMILY MEDICINE

## 2022-10-17 PROCEDURE — 3078F DIAST BP <80 MM HG: CPT | Performed by: FAMILY MEDICINE

## 2022-10-17 PROCEDURE — 99215 OFFICE O/P EST HI 40 MIN: CPT | Performed by: FAMILY MEDICINE

## 2022-10-17 RX ORDER — CLONAZEPAM 0.5 MG/1
0.5 TABLET ORAL DAILY
Qty: 30 TABLET | Refills: 2 | Status: SHIPPED | OUTPATIENT
Start: 2022-10-17 | End: 2023-01-15

## 2022-10-17 NOTE — PATIENT INSTRUCTIONS
Eric Harper was seen today for asthma and hypertension. Diagnoses and all orders for this visit:    Episodic lightheadedness  Keep fluid. Await MRI. Cerebellar tonsillar ectopia ([address]  Could be related to light headedness. Essential hypertension  -     Good control.  -     Continue meds and lifestyle control. Non-seasonal allergic rhinitis due to pollen  Not related to light headedness. Obstructive sleep apnea syndrome  Could be related. Anxiety  -     clonazePAM (KLONOPIN) 0.5 MG tablet; Take 1 tablet by mouth daily for 90 days.

## 2022-10-17 NOTE — PROGRESS NOTES
Koby Crystal (:  1970) is a 49 y.o. male,Established patient, here for evaluation of the following chief complaint(s):  Asthma (Three month follow up, colonoscopy due, ) and Hypertension (HTN ROUTINE FOLLOW UP )       ASSESSMENT/PLAN:  848    Patient Instructions   Olya Birmingham was seen today for asthma and hypertension. Diagnoses and all orders for this visit:    Episodic lightheadedness  Keep fluid intake good. Await MRI. Cerebellar tonsillar ectopia ([address]  Could be related to light headedness. Essential hypertension  -     Good control.  -     Continue meds and lifestyle control. Non-seasonal allergic rhinitis due to pollen  Not related to light headedness. Obstructive sleep apnea syndrome  Could be related. Anxiety  -     clonazePAM (KLONOPIN) 0.5 MG tablet; Take 1 tablet by mouth daily for 90 days. Asthma/COPD  Trial of albuterol prior to exertion recommended. May help prevent lightheadedness and presyncope    Return in about 3 months (around 2023) for GERD, Anxiety, Hypertension. Subjective   SUBJECTIVE/OBJECTIVE:  Chief Complaint   Patient presents with    Asthma     Three month follow up, colonoscopy due,     Hypertension     HTN ROUTINE FOLLOW UP    804    Episodic lightheadedness  Was better after he was txed for sinusitis below. Was climbing into his deer stand and started feeling like he would pass out. Was not vertigo. Was exerting self. Cerebellar tonsillar ectopia (HCC)  Feels pressure in his head like a cold or flu. Is from the eyes up in forehead at brow line. Messes with eyesight X 4-5 months. No f/c/s. Has been since 2022. Essential hypertension  Is not checking Bp at home. Has no cuff. Not correlate lightheadedness with blood pressure. Non-seasonal allergic rhinitis due to pollen  Sx once in awhile. Usually if house ese, mows lawn, high pollen mold counts. No pressure at that time. Obstructive sleep apnea syndrome  Is gets 5-6 hr sleep. Sleeps soundly but not refreshed when gets up oft. Got 7.5 hrs last night and feels good. When wakes early can't get back to sleep. Anxiety  Using his med daily. Chronic Sinusitis  ER visit 9/21/22  Chief Complaint: Sinus pressure  Janet Pelaez is a 49 y.o. male who presents to the emergency department the chief complaint of sinus pressure behind his eyebrows. He states if he turns his head he can feel the fluid moving in his head. This is been ongoing for least 6 months. He states he has had intermittent issues with this over the past 4 years and saw ENT in the past a few years ago and was on multiple rounds of antibiotics and finally it cleared up until began about in April again. He has been reviewing records on Zithromax, Bactrim and Augmentin along with a Medrol Dosepak. He has not been on any antibiotics recently. Has been seen by ENT and neurology. Recently placed on Topamax by neurology. He states he is not having any relief. He states that it is really not painful he just feels like he is \"in misery. \"  Denies visual changes, difficulty moving his extremities, nausea, vomiting, fevers, neck stiffness. Had MRI in the past that revealed cerebral tonsillar ectopia and recently had CT that did reveal some right maxillary sinus that was opacified with retention cyst and some mild inflammatory disease of the ethmoid and frontal sinuses. FINAL IMPRESSION       1. Chronic frontal sinusitis      Review of Systems    Past Medical History:   Diagnosis Date    Anxiety     Asthma 01/01/2000    late 20's-early 30s.     Cerebellar tonsillar ectopia (HCC) 5/4/2021    Class 1 obesity due to excess calories without serious comorbidity with body mass index (BMI) of 31.0 to 31.9 in adult 9/14/2020    Closed fracture of head of fifth metacarpal     Boxer's fracture    Former smoker 05/2019    started age 17, quit age 53    Fracture of finger of right hand     And knuckle    GI bleed 6/10/2019    History of blood transfusion     new this admission 6/10/19    Hypertension     Iron deficiency anemia due to chronic blood loss 10/31/2019    DUE TO GI BLEED/ULCERS    Lumbar disc disease 01/01/1994    L4-5 proteuding disc    Peptic ulcer 01/01/2015    EGD 3/2/2020: Distal circumferentialesophageal ulcer and distal Parra's. Gastric body and antrum gastritis    Seasonal allergic rhinitis     Tinnitus of both ears 1/1/2017    Loss of hearing in left. Past Surgical History:   Procedure Laterality Date    COLONOSCOPY  03/2015    1 benign polyp. [address] Due 1510-6037.     ENDOSCOPY, COLON, DIAGNOSTIC      LUMBAR SPINE SURGERY  01/01/2002    epidural steroids x3    NASAL ENDOSCOPY  03/11/2020    UPPER GASTROINTESTINAL ENDOSCOPY  3/2015    3 ulcers - peptic and esophageal    UPPER GASTROINTESTINAL ENDOSCOPY  06/2019    +ulcers    UPPER GASTROINTESTINAL ENDOSCOPY N/A 06/11/2019    EGD DIAGNOSTIC ONLY performed by Kathe Orona MD at [place] [address]  08/19/2019    Dr. Kenzie Nicole, ulcer healing in esophagitis, Parra's 1    UPPER GASTROINTESTINAL ENDOSCOPY N/A 01/06/2020    EGD CONTROL HEMORRHAGE performed by Rufino Frazier MD at [place] [address] N/A 01/06/2020    EGD SUBMUCOSAL/ INJECTION OF EPINEPHRINE performed by Rufino Frazier MD at [place] [address] N/A 03/02/2020    EGD BIOPSY performed by Vamsi Adan MD at [address] [place] [address]  01/01/1983    and 1989       Social History     Socioeconomic History    Marital status: Single     Spouse name: Not on file    Number of children: 2    Years of education: 12    Highest education level: High school graduate   Occupational History    Occupation: FACTORY IN PAST     Comment: 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 None since 9/2018    Sexual activity: Yes   Other Topics Concern    Not on file   Social History Narrative    Exercise - too light headed. 3/26/19. None. 1/15/20. Walks every am about 1.5 mi 7 days/wk. 4/14/20 2.3 mi every day. 5/4/20. Walks 2 mi/day. 7/15/20. Was having anxiety attacks while walking his dog so stopped for 2 months. 10/6/20. Walking 1.5 mi 7 day/wk. 8/24/21. 1.5-1.75 mi/day x7 day/wk. 12/7/21. Also Does 90 min 7 days core body. 3/8/22. Walks 2 mi/day 7 day/wk. 7/11/22.10/17/22. Social Determinants of Health     Financial Resource Strain: Unknown    Difficulty of Paying Living Expenses: Patient refused   Food Insecurity: Unknown    Worried About Running Out of Food in the Last Year: Patient refused    Ran Out of Food in the Last Year: Patient refused   Transportation Needs: Unknown    Lack of Transportation (Medical): Patient refused    Lack of Transportation (Non-Medical): Patient refused   Physical Activity: Not on file   Stress: Not on file   Social Connections: Not on file   Intimate Partner Violence: Not on file   Housing Stability: Not on file       Family History   Problem Relation Age of Onset    Other Mother         gall bladder    Allergies Mother     Heart Attack Father 52        x3-4    Coronary Art Dis Father     Alcohol Abuse Father     Allergies Father     Other Brother         congenital calcium deposits n bones, COVID-19@ 48.     Allergies Brother     Hearing Loss Brother         congenital    Allergy (Severe) Brother     No Known Problems Brother in a wreck as teen    Cancer Maternal Grandmother     Diabetes type 2  Maternal Grandmother     Cancer Maternal Grandfather     Asthma Paternal Grandfather     No Known Problems Son     No Known Problems Son     Cancer Maternal Aunt         brain    Other Maternal Aunt         lung issues    Heart Attack Paternal Aunt 46    Coronary Art Dis Paternal Aunt     Cancer Paternal Aunt         ? lung?     Lung Cancer Paternal Aunt     Lung Cancer Paternal Aunt     Lung Cancer Paternal Uncle      No Known Allergies    Current Outpatient Medications   Medication Sig Dispense Refill    pantoprazole (PROTONIX) 40 MG tablet Take 1 tablet by mouth twice daily 180 tablet 0    metoprolol tartrate (LOPRESSOR) 25 MG tablet Take 1 tablet by mouth twice daily 180 tablet 0    lisinopril (PRINIVIL;ZESTRIL) 20 MG tablet Take 1/2 (one-half) tablet by mouth once daily 45 tablet 0    topiramate (TOPAMAX) 25 MG tablet Take 1 tablet daily for 1 week and then 1 tablet by mouth twice daily 60 tablet 1    azelastine (ASTELIN) 0.1 % nasal spray 1 spray by Nasal route 2 times daily Use in each nostril as directed 30 mL 5    montelukast (SINGULAIR) 10 MG tablet Take 1 tablet by mouth nightly 30 tablet 11    fluticasone (FLONASE) 50 MCG/ACT nasal spray 2 sprays by Each Nostril route daily 48 g 1    Omega-3 Fatty Acids (OMEGA 3 PO) Take 1 capsule by mouth every morning (before breakfast) Indications: Decreased HDL Cholesterol      albuterol sulfate HFA (PROVENTIL HFA) 108 (90 Base) MCG/ACT inhaler Inhale 2 puffs into the lungs every 4 hours as needed for Wheezing or Shortness of Breath Please provide pt c spacer as well 1 Inhaler 5    budesonide-formoterol (SYMBICORT) 160-4.5 MCG/ACT AERO Inhale 2 puffs into the lungs 2 times daily 3 Inhaler 5    Zinc Acetate-Sweetleaf (FP ZINC LOZENGES PO) Take 0.5 lozenges by mouth 2 times daily       vitamin C (ASCORBIC ACID) 500 MG tablet Take 500 mg by mouth 2 times daily      Cholecalciferol (VITAMIN D3) 50 MCG (2000 UT) CAPS Take 1 capsule by mouth daily       Multiple Vitamins-Minerals (THERAPEUTIC MULTIVITAMIN-MINERALS) tablet Take 1 tablet by mouth daily      clonazePAM (KLONOPIN) 0.5 MG tablet Take 1 tablet by mouth daily for 90 days. 30 tablet 2     No current facility-administered medications for this visit. Objective   Vitals:    10/17/22 0801   BP: 136/74   Site: Left Upper Arm   Position: Sitting   Cuff Size: Medium Adult   Weight: 219 lb (99.3 kg)   Height: 6' (1.829 m)     BP Readings from Last 3 Encounters:   10/17/22 136/74   09/21/22 (!) 162/83   08/29/22 139/79     Pulse Readings from Last 3 Encounters:   09/21/22 71   08/29/22 68   07/26/22 93     Wt Readings from Last 3 Encounters:   10/17/22 219 lb (99.3 kg)   09/21/22 221 lb 3.2 oz (100.3 kg)   08/29/22 222 lb (100.7 kg)     Body mass index is 29.7 kg/m². Physical Exam  Vitals and nursing note reviewed. Constitutional:       General: He is not in acute distress. Appearance: Normal appearance. He is well-developed. He is obese. He is not ill-appearing or diaphoretic. Eyes:      General: No scleral icterus. Right eye: No discharge. Left eye: No discharge. Conjunctiva/sclera: Conjunctivae normal.   Neck:      Thyroid: No thyroid mass or thyromegaly. Vascular: No carotid bruit or JVD. Trachea: Trachea and phonation normal. No tracheal tenderness or tracheal deviation. Cardiovascular:      Rate and Rhythm: Normal rate and regular rhythm. Heart sounds: Normal heart sounds, S1 normal and S2 normal. Heart sounds not distant. No murmur heard. No friction rub. No gallop. No S3 or S4 sounds. Pulmonary:      Effort: Pulmonary effort is normal. No respiratory distress. Breath sounds: Normal breath sounds. No stridor. No decreased breath sounds, wheezing, rhonchi or rales. Musculoskeletal:      Cervical back: Neck supple.    Lymphadenopathy:      Head:      Right side of head: No submandibular or tonsillar adenopathy. Left side of head: No submandibular or tonsillar adenopathy. Cervical: No cervical adenopathy. Right cervical: No superficial, deep or posterior cervical adenopathy. Left cervical: No superficial, deep or posterior cervical adenopathy. Skin:     General: Skin is warm and dry. Coloration: Skin is not pale. Neurological:      Mental Status: He is alert. Coordination: Romberg sign negative (No worsening with C spine ROM. ). Deep Tendon Reflexes:      Reflex Scores:       Patellar reflexes are 2+ on the right side and 2+ on the left side. Psychiatric:         Attention and Perception: Attention and perception normal.         Mood and Affect: Mood and affect normal.         Speech: Speech normal.         Behavior: Behavior normal. Behavior is cooperative. Cognition and Memory: Cognition and memory normal.         Judgment: Judgment normal.        On this date 10/17/2022 I have spent 44 minutes reviewing previous notes, test results and face to face with the patient discussing the diagnosis and importance of compliance with the treatment plan as well as documenting on the day of the visit. An electronic signature was used to authenticate this note.     --Wilfrid Wahl,

## 2022-10-19 ENCOUNTER — HOSPITAL ENCOUNTER (OUTPATIENT)
Dept: MRI IMAGING | Age: 52
Discharge: HOME | End: 2022-10-19
Payer: MEDICARE

## 2022-10-19 DIAGNOSIS — R42 EPISODIC LIGHTHEADEDNESS: ICD-10-CM

## 2022-10-19 PROCEDURE — 70551 MRI BRAIN STEM W/O DYE: CPT

## 2022-10-21 DIAGNOSIS — J45.901 MODERATE ASTHMA WITH ACUTE EXACERBATION, UNSPECIFIED WHETHER PERSISTENT: ICD-10-CM

## 2022-10-21 RX ORDER — BUDESONIDE AND FORMOTEROL FUMARATE DIHYDRATE 160; 4.5 UG/1; UG/1
AEROSOL RESPIRATORY (INHALATION)
Qty: 11 G | Refills: 0 | Status: SHIPPED | OUTPATIENT
Start: 2022-10-21

## 2022-10-21 RX ORDER — ALBUTEROL SULFATE 90 UG/1
AEROSOL, METERED RESPIRATORY (INHALATION)
Qty: 18 G | Refills: 0 | Status: SHIPPED | OUTPATIENT
Start: 2022-10-21

## 2022-10-29 PROBLEM — D50.0 IRON DEFICIENCY ANEMIA DUE TO CHRONIC BLOOD LOSS: Chronic | Status: RESOLVED | Noted: 2019-10-31 | Resolved: 2022-10-29

## 2022-10-29 PROBLEM — K22.10 ULCER OF ESOPHAGUS WITHOUT BLEEDING: Status: RESOLVED | Noted: 2020-05-05 | Resolved: 2022-10-29

## 2022-10-29 PROBLEM — J30.1 NON-SEASONAL ALLERGIC RHINITIS DUE TO POLLEN: Chronic | Status: ACTIVE | Noted: 2020-09-14

## 2022-10-29 PROBLEM — G47.33 OBSTRUCTIVE SLEEP APNEA SYNDROME: Chronic | Status: ACTIVE | Noted: 2020-09-14

## 2022-10-29 PROBLEM — K92.2 GI BLEED: Status: RESOLVED | Noted: 2019-06-10 | Resolved: 2022-10-29

## 2022-10-31 ENCOUNTER — OFFICE VISIT (OUTPATIENT)
Dept: NEUROLOGY | Age: 52
End: 2022-10-31
Payer: MEDICARE

## 2022-10-31 VITALS
SYSTOLIC BLOOD PRESSURE: 139 MMHG | HEIGHT: 72 IN | BODY MASS INDEX: 29.66 KG/M2 | HEART RATE: 67 BPM | WEIGHT: 219 LBS | DIASTOLIC BLOOD PRESSURE: 81 MMHG

## 2022-10-31 DIAGNOSIS — Q04.8 CEREBELLAR TONSILLAR ECTOPIA (HCC): ICD-10-CM

## 2022-10-31 DIAGNOSIS — G43.009 MIGRAINE WITHOUT AURA AND WITHOUT STATUS MIGRAINOSUS, NOT INTRACTABLE: Primary | ICD-10-CM

## 2022-10-31 DIAGNOSIS — G47.33 OBSTRUCTIVE SLEEP APNEA: ICD-10-CM

## 2022-10-31 PROCEDURE — 99214 OFFICE O/P EST MOD 30 MIN: CPT | Performed by: PSYCHIATRY & NEUROLOGY

## 2022-10-31 PROCEDURE — 3074F SYST BP LT 130 MM HG: CPT | Performed by: PSYCHIATRY & NEUROLOGY

## 2022-10-31 PROCEDURE — G8417 CALC BMI ABV UP PARAM F/U: HCPCS | Performed by: PSYCHIATRY & NEUROLOGY

## 2022-10-31 PROCEDURE — G8484 FLU IMMUNIZE NO ADMIN: HCPCS | Performed by: PSYCHIATRY & NEUROLOGY

## 2022-10-31 PROCEDURE — G8427 DOCREV CUR MEDS BY ELIG CLIN: HCPCS | Performed by: PSYCHIATRY & NEUROLOGY

## 2022-10-31 PROCEDURE — 1036F TOBACCO NON-USER: CPT | Performed by: PSYCHIATRY & NEUROLOGY

## 2022-10-31 PROCEDURE — 3017F COLORECTAL CA SCREEN DOC REV: CPT | Performed by: PSYCHIATRY & NEUROLOGY

## 2022-10-31 PROCEDURE — 3078F DIAST BP <80 MM HG: CPT | Performed by: PSYCHIATRY & NEUROLOGY

## 2022-10-31 RX ORDER — TOPIRAMATE 25 MG/1
TABLET ORAL
Qty: 270 TABLET | Refills: 1 | Status: SHIPPED | OUTPATIENT
Start: 2022-10-31

## 2022-10-31 NOTE — PROGRESS NOTES
Kierra Jacobsen   Neurology followup    Subjective:   CC/HP  History was obtained from the patient. Patient stated that he feels better. He states that he took some steroids and antibiotics back in September and that might have helped as well. The migraine headaches are improving. She is on very low-dose topiramate without any side effects. Background history:  Patient was referred for evaluation of pain in the occipital region with spread to the front of the head. He also gets somewhat dizzy and lightheaded with this. He states he has had previous vertigo but no vertigo associated with the symptoms. Symptoms started about 2 years ago. Onset was gradual.  Symptoms are slowly progressive and now he gets them almost on a daily basis. Patient states that he was treated with antibiotics and steroids by ENT which seemed to help some for a few months but then the symptoms came back. Patient has had an MRI brain and CT scans done. Patient quit smoking in 2019. No other focal neurological symptoms  Patient snores during sleep. He does not feel rested when he wakes up in the morning and if daytime sleepiness. He wakes up feeling breathless in the middle of the night. REVIEW OF SYSTEMS    Constitutional:  []   Chills   []  Fatigue   []  Fevers   []  Malaise   []  Weight loss     [x] Denies all of the above    Respiratory:   []  Cough    []  Shortness of breath         [x] Denies all of the above     Cardiovascular:   []  Chest pain    []  Exertional chest pressure/discomfort           [] Palpitations    []  Syncope     [x] Denies all of the above        Past Medical History:   Diagnosis Date    Anxiety     Asthma 01/01/2000    late 20's-early 30s.     Cerebellar tonsillar ectopia (HCC) 5/4/2021    Class 1 obesity due to excess calories without serious comorbidity with body mass index (BMI) of 31.0 to 31.9 in adult 9/14/2020    Closed fracture of head of fifth metacarpal     Boxer's fracture    Former smoker 05/2019    started age 17, quit age 53    Fracture of finger of right hand     And knuckle    GI bleed 6/10/2019    History of blood transfusion     new this admission 6/10/19    Hypertension     Iron deficiency anemia due to chronic blood loss 10/31/2019    DUE TO GI BLEED/ULCERS    Lumbar disc disease 01/01/1994    L4-5 proteuding disc    Peptic ulcer 01/01/2015    EGD 3/2/2020: Distal circumferentialesophageal ulcer and distal Parra's. Gastric body and antrum gastritis    Seasonal allergic rhinitis     Tinnitus of both ears 1/1/2017    Loss of hearing in left. Ulcer of esophagus without bleeding 5/5/2020     Family History   Problem Relation Age of Onset    Other Mother         gall bladder    Allergies Mother     Heart Attack Father 52        x3-4    Coronary Art Dis Father     Alcohol Abuse Father     Allergies Father     Other Brother         congenital calcium deposits n bones, COVID-19@ 48. Allergies Brother     Hearing Loss Brother         congenital    Allergy (Severe) Brother     No Known Problems Brother         in a wreck as teen    Cancer Maternal Grandmother     Diabetes type 2  Maternal Grandmother     Cancer Maternal Grandfather     Asthma Paternal Grandfather     No Known Problems Son     No Known Problems Son     Cancer Maternal Aunt         brain    Other Maternal Aunt         lung issues    Heart Attack Paternal Aunt 46    Coronary Art Dis Paternal Aunt     Cancer Paternal Aunt         ? lung?     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 None since 9/2018    Sexual activity: Yes   Social History Narrative    Exercise - too light headed. 3/26/19. None. 1/15/20. Walks every am about 1.5 mi 7 days/wk. 4/14/20 2.3 mi every day. 5/4/20. Walks 2 mi/day. 7/15/20. Was having anxiety attacks while walking his dog so stopped for 2 months. 10/6/20. Walking 1.5 mi 7 day/wk. 8/24/21. 1.5-1.75 mi/day x7 day/wk. 12/7/21. Also Does 90 min 7 days core body. 3/8/22. Walks 2 mi/day 7 day/wk. 7/11/22.10/17/22. Social Determinants of Health     Financial Resource Strain: Unknown    Difficulty of Paying Living Expenses: Patient refused   Food Insecurity: Unknown    Worried About Running Out of Food in the Last Year: Patient refused    Ran Out of Food in the Last Year: Patient refused   Transportation Needs: Unknown    Lack of Transportation (Medical): Patient refused    Lack of Transportation (Non-Medical): Patient refused        Objective:  Exam:  /81   Pulse 67   Ht 6' (1.829 m)   Wt 219 lb (99.3 kg)   BMI 29.70 kg/m²   This is a well-nourished patient in no acute distress  Patient is awake, alert and oriented x3. Speech is normal.  Pupils are equal round reacting to light. Extraocular movements intact. Face symmetrical. Tongue midline. Motor exam shows normal symmetrical strength. Deep tendon reflexes normal. Plantar reflexes downgoing. Sensory exam normal. Coordination normal. Gait normal. No carotid bruit. No neck stiffness.       Data :  LABS:  General Labs:  CBC:   Lab Results   Component Value Date/Time    WBC 8.8 03/08/2022 08:53 AM    RBC 5.37 03/08/2022 08:53 AM    HGB 15.7 03/08/2022 08:53 AM    HCT 47.1 03/08/2022 08:53 AM    MCV 87.6 03/08/2022 08:53 AM    MCH 29.2 03/08/2022 08:53 AM    MCHC 33.4 03/08/2022 08:53 AM    RDW 13.7 03/08/2022 08:53 AM     03/08/2022 08:53 AM    MPV 8.4 03/08/2022 08:53 AM     BMP:    Lab Results   Component Value Date/Time     03/08/2022 08:53 AM    K 5.2 03/08/2022 08:53 AM    K 3.9 01/07/2020 07:34 AM     03/08/2022 08:53 AM    CO2 24 03/08/2022 08:53 AM    BUN 18 03/08/2022 08:53 AM    LABALBU 4.8 03/08/2022 08:53 AM    CREATININE 1.0 03/08/2022 08:53 AM    CALCIUM 10.0 03/08/2022 08:53 AM    GFRAA >60 03/08/2022 08:53 AM    LABGLOM >60 03/08/2022 08:53 AM    GLUCOSE 97 03/08/2022 08:53 AM     RADIOLOGY REVIEW: MRI brain    Impression :  Mild tonsillar ectopia, probably asymptomatic  Probable obstructive sleep apnea  Migraine headaches, some improvement    Plan :  Discussed with patient at length  I will increase her topiramate dose to 25 mg in the morning and 50 mg at night  Patient has sleep medicine consultation scheduled in January 2023. No need for further work-up for the mild tonsillar ectopia. Please note a portion of  this chart was generated using dragon dictation software. Although every effort was made to ensure the accuracy of this automated transcription, some errors in transcription may have occurred.

## 2022-11-14 ENCOUNTER — OFFICE VISIT (OUTPATIENT)
Dept: ENT CLINIC | Age: 52
End: 2022-11-14
Payer: MEDICARE

## 2022-11-14 VITALS — DIASTOLIC BLOOD PRESSURE: 90 MMHG | SYSTOLIC BLOOD PRESSURE: 162 MMHG | TEMPERATURE: 98.6 F | HEART RATE: 82 BPM

## 2022-11-14 DIAGNOSIS — G43.809 OTHER MIGRAINE WITHOUT STATUS MIGRAINOSUS, NOT INTRACTABLE: ICD-10-CM

## 2022-11-14 DIAGNOSIS — J30.9 ALLERGIC RHINITIS, UNSPECIFIED SEASONALITY, UNSPECIFIED TRIGGER: Primary | ICD-10-CM

## 2022-11-14 DIAGNOSIS — R42 DIZZINESS: ICD-10-CM

## 2022-11-14 DIAGNOSIS — J34.89 CONCHA BULLOSA: ICD-10-CM

## 2022-11-14 DIAGNOSIS — J32.0 LEFT MAXILLARY SINUSITIS: ICD-10-CM

## 2022-11-14 DIAGNOSIS — Z91.89 AT RISK FOR OBSTRUCTIVE SLEEP APNEA: ICD-10-CM

## 2022-11-14 PROCEDURE — 3017F COLORECTAL CA SCREEN DOC REV: CPT | Performed by: STUDENT IN AN ORGANIZED HEALTH CARE EDUCATION/TRAINING PROGRAM

## 2022-11-14 PROCEDURE — G8427 DOCREV CUR MEDS BY ELIG CLIN: HCPCS | Performed by: STUDENT IN AN ORGANIZED HEALTH CARE EDUCATION/TRAINING PROGRAM

## 2022-11-14 PROCEDURE — 1036F TOBACCO NON-USER: CPT | Performed by: STUDENT IN AN ORGANIZED HEALTH CARE EDUCATION/TRAINING PROGRAM

## 2022-11-14 PROCEDURE — G8484 FLU IMMUNIZE NO ADMIN: HCPCS | Performed by: STUDENT IN AN ORGANIZED HEALTH CARE EDUCATION/TRAINING PROGRAM

## 2022-11-14 PROCEDURE — 3074F SYST BP LT 130 MM HG: CPT | Performed by: STUDENT IN AN ORGANIZED HEALTH CARE EDUCATION/TRAINING PROGRAM

## 2022-11-14 PROCEDURE — G8417 CALC BMI ABV UP PARAM F/U: HCPCS | Performed by: STUDENT IN AN ORGANIZED HEALTH CARE EDUCATION/TRAINING PROGRAM

## 2022-11-14 PROCEDURE — 99214 OFFICE O/P EST MOD 30 MIN: CPT | Performed by: STUDENT IN AN ORGANIZED HEALTH CARE EDUCATION/TRAINING PROGRAM

## 2022-11-14 PROCEDURE — 3078F DIAST BP <80 MM HG: CPT | Performed by: STUDENT IN AN ORGANIZED HEALTH CARE EDUCATION/TRAINING PROGRAM

## 2022-11-14 NOTE — PROGRESS NOTES
[address]  FOLLOW-UP VISIT      Patient Name: Ponce NICOLAS Record Number:  [id number]  Primary Care Physician:  Carrington Galicia DO    ChiefComplaint     Chief Complaint   Patient presents with    Sinus Problem     Discuss sinus surgery        History of Present Illness     Jeb Whitney is an 47 y.o. male previously seen for migraines, possible sleep apnea, bilateral inferior turban hypertrophy, deviated nasal septum, chronic sinusitis. Interval History:     He states that the pressure and pain that he had is in sinuses got better after given antibiotics and steroids by the ED. He does still feel a very bothersome sensation in the center of his forehead between his eyes that \"should not be there\". He takes over-the-counter mucus relief without significant improvement. Also states he has some numbness on his upper lip and nose. This sensation in his forehead is new to him and is quite bothersome. He is also had dizziness, no room spinning vertigo but more of a lightheadedness. No recent hearing changes. He has chronic bilateral tinnitus for the last 8 years. He does state that he is tired throughout the day and he has an appointment to see a sleep specialist in January. Past Medical History     Past Medical History:   Diagnosis Date    Anxiety     Asthma 01/01/2000    late 20's-early 30s.     Cerebellar tonsillar ectopia ([address] 5/4/2021    Class 1 obesity due to excess calories without serious comorbidity with body mass index (BMI) of 31.0 to 31.9 in adult 9/14/2020    Closed fracture of head of fifth metacarpal     Boxer's fracture    Former smoker 05/2019    started age 17, quit age 53    Fracture of finger of right hand     And knuckle    GI bleed 6/10/2019    History of blood transfusion     new this admission 6/10/19    Hypertension     Iron deficiency anemia due to chronic blood loss 10/31/2019    DUE TO GI BLEED/ULCERS Lumbar disc disease 01/01/1994    L4-5 proteuding disc    Peptic ulcer 01/01/2015    EGD 3/2/2020: Distal circumferentialesophageal ulcer and distal Parra's. Gastric body and antrum gastritis    Seasonal allergic rhinitis     Tinnitus of both ears 1/1/2017    Loss of hearing in left. Ulcer of esophagus without bleeding 5/5/2020       Past Surgical History     Past Surgical History:   Procedure Laterality Date    COLONOSCOPY  03/2015    1 benign polyp. [address] Due 9964-0001. ENDOSCOPY, COLON, DIAGNOSTIC      LUMBAR SPINE SURGERY  01/01/2002    epidural steroids x3    NASAL ENDOSCOPY  03/11/2020    UPPER GASTROINTESTINAL ENDOSCOPY  3/2015    3 ulcers - peptic and esophageal    UPPER GASTROINTESTINAL ENDOSCOPY  06/2019    +ulcers    UPPER GASTROINTESTINAL ENDOSCOPY N/A 06/11/2019    EGD DIAGNOSTIC ONLY performed by Marylu Eastman MD at [place] [address]  08/19/2019    Dr. Omega Campos, ulcer healing in esophagitis, Parra's 1    UPPER GASTROINTESTINAL ENDOSCOPY N/A 01/06/2020    EGD CONTROL HEMORRHAGE performed by Alice Willis MD at [place] [address] N/A 01/06/2020    EGD SUBMUCOSAL/ INJECTION OF EPINEPHRINE performed by Alice Willis MD at [place] [address] N/A 03/02/2020    EGD BIOPSY performed by Mary Anne Wolff MD at [address] [place] [address]  01/01/1983    and 1989       Family History     Family History   Problem Relation Age of Onset    Other Mother         gall bladder    Allergies Mother     Heart Attack Father 52        x3-4    Coronary Art Dis Father     Alcohol Abuse Father     Allergies Father     Other Brother         congenital calcium deposits n bones, COVID-19@ 48.     Allergies Brother     Hearing Loss Brother         congenital    Allergy (Severe) Brother     No Known Problems Brother         in a wreck as teen    Cancer Maternal Grandmother Diabetes type 2  Maternal Grandmother     Cancer Maternal Grandfather     Asthma Paternal Grandfather     No Known Problems Son     No Known Problems Son     Cancer Maternal Aunt         brain    Other Maternal Aunt         lung issues    Heart Attack Paternal Aunt 46    Coronary Art Dis Paternal Aunt     Cancer Paternal Aunt         ? lung? Lung Cancer Paternal Aunt     Lung Cancer Paternal Aunt     Lung Cancer Paternal Uncle        Social History     Social History     Tobacco Use    Smoking status: Former     Packs/day: 1.50     Years: 35.00     Pack years: 52.50     Types: Cigarettes     Start date: 1/1/1988     Quit date: 5/1/2019     Years since quitting: 3.5    Smokeless tobacco: Former     Types: Snuff    Tobacco comments:     started to smoke at 16 / smoked up to 1.5 ppd / smoked for 35 yrs on and off / 1 PPD (QUIT 1246-1119)   Vaping Use    Vaping Use: Never used   Substance Use Topics    Alcohol use: No     Comment: rare alcohol    Drug use: Not Currently     Types: Marijuana [place])     Comment: was daily. None since 9/2018        Allergies     No Known Allergies    Medications     Current Outpatient Medications   Medication Sig Dispense Refill    topiramate (TOPAMAX) 25 MG tablet Take 1 tablet in the morning and 2 tablets at night 270 tablet 1    SYMBICORT 160-4.5 MCG/ACT AERO Inhale 2 puffs by mouth twice daily 11 g 0    albuterol sulfate HFA (PROVENTIL;VENTOLIN;PROAIR) 108 (90 Base) MCG/ACT inhaler INHALE 2 PUFFS BY MOUTH EVERY 4 HOURS AS NEEDED FOR WHEEZING OR SHORTNESS OF BREATH 18 g 0    clonazePAM (KLONOPIN) 0.5 MG tablet Take 1 tablet by mouth daily for 90 days.  30 tablet 2    pantoprazole (PROTONIX) 40 MG tablet Take 1 tablet by mouth twice daily 180 tablet 0    metoprolol tartrate (LOPRESSOR) 25 MG tablet Take 1 tablet by mouth twice daily 180 tablet 0    lisinopril (PRINIVIL;ZESTRIL) 20 MG tablet Take 1/2 (one-half) tablet by mouth once daily 45 tablet 0    azelastine (ASTELIN) 0.1 % nasal spray 1 spray by Nasal route 2 times daily Use in each nostril as directed 30 mL 5    montelukast (SINGULAIR) 10 MG tablet Take 1 tablet by mouth nightly 30 tablet 11    fluticasone (FLONASE) 50 MCG/ACT nasal spray 2 sprays by Each Nostril route daily 48 g 1    Omega-3 Fatty Acids (OMEGA 3 PO) Take 1 capsule by mouth every morning (before breakfast) Indications: Decreased HDL Cholesterol      Zinc Acetate-Sweetleaf (FP ZINC LOZENGES PO) Take 0.5 lozenges by mouth 2 times daily       vitamin C (ASCORBIC ACID) 500 MG tablet Take 500 mg by mouth 2 times daily      Cholecalciferol (VITAMIN D3) 50 MCG (2000 UT) CAPS Take 1 capsule by mouth daily       Multiple Vitamins-Minerals (THERAPEUTIC MULTIVITAMIN-MINERALS) tablet Take 1 tablet by mouth daily       No current facility-administered medications for this visit. Review of Systems     REVIEW OF SYSTEM: See HPI above    PhysicalExam     Vitals:    11/14/22 1341   BP: (!) 162/90   Pulse: 82   Temp: 98.6 °F (37 °C)   TempSrc: Temporal       PHYSICAL EXAM  BP (!) 162/90   Pulse 82   Temp 98.6 °F (37 °C) (Temporal)     GENERAL: No acute distress, alert and oriented. EYES: EOMI, Anti-icteric. NOSE: On anterior rhinoscopy there is no epistaxis, nasal mucosa moist and normal appearing, no purulent drainage. Nasal septum deviated to the left. Bilateral inferior turbinates hypertrophied.   EARS: Normal external appearance; on portable otomicroscopy:     -Ad: External auditory canal without stenosis, tympanic membrane clear, no middle ear effusions or retractions     -As: External auditory canal without stenosis, tympanic membrane clear, no middle ear effusions or retractions  FACE: HB 1/6 bilaterally, symmetric appearing, sensation equal bilaterally  ORAL CAVITY: No masses or lesions visualized or palpated, uvula is midline, moist mucous membranes, no oropharyngeal masses or oropharyngeal obstruction  NECK: Normal range of motion, no thyromegaly, trachea is midline, no palpable lymphadenopathy or neck masses, no crepitus  CHEST: Normal respiratory effort, breathing comfortably, no retractions  SKIN: No rashes, normal appearing skin, no evidence of skin lesions/tumors  NEURO: Cranial Nerves 2, 3, 4, 5, 6, 7, 11, 12 grossly intact bilaterally     I have performed a head and neck physical exam personally or was physically present during the key or critical portions of the service. Assessment and Plan     1. Allergic rhinitis, unspecified seasonality, unspecified trigger  -I think a lot of the symptoms he is experiencing, especially with the pressure in the middle of his forehead between his eyes, is likely related to underlying allergies. There is no significant sinonasal inflammation at this site and I would reserve surgery as a last resort as there is a good chance that he may still have the symptoms after surgery as his CT scan demonstrates that most of his sinus disease is related to his left maxillary sinus.  -Referral made to allergist, Dr. José Antonio Sears. He is currently on metoprolol but hopefully can get off of this so he can do allergy testing and possibly allergy treatment  -Continue Flonase and Astelin    2. Tootie bullosa  3. Left maxillary sinusitis  -He will follow-up in a couple months after his sleep study and hopefully after being seen by allergy. If he continues to have symptoms can consider functional scopic sinus surgery although like I said above I do have doubts that sinus surgery alone would fix all of his treatments    4. Dizziness  -One of his major symptoms is new onset dizziness. Low likelihood for peripheral vestibulopathy given lack of true vertigo and hearing changes. I explained to him that this is likely not related to his underlying sinus disease. This is another reason to obtain a sleep study and get possibly treated for underlying sleep apnea.     5. Other migraine without status migrainosus, not intractable  -Continue migraine treatment through neurology    6. At risk for obstructive sleep apnea  -Has an appointment to see sleep specialist in January        Follow-Up     Return in about 2 months (around 1/14/2023). Dr. Jimmy Scheuermann, [place]   Department of Otolaryngology/Head and Neck Surgery  11/14/22    Medical Decision Making: The following items were considered in medical decision making:  Independent review of images  Review / order clinical lab tests  Review / order radiology tests  Decision to obtain old records      This note was generated completely or in part utilizing Dragon dictation speech recognition software. Occasionally, words are mistranscribed and despite editing, the text may contain inaccuracies due to incorrect word recognition. If further clarification is needed please contact the office at [phone].

## 2022-11-16 ENCOUNTER — PATIENT MESSAGE (OUTPATIENT)
Dept: FAMILY MEDICINE CLINIC | Age: 52
End: 2022-11-16

## 2022-11-16 NOTE — TELEPHONE ENCOUNTER
From: Chanelle Jauregui  To: Dr. Boyce Pass: 11/16/2022 12:38 PM EST  Subject: Recommendation     Dr. Mauricio Don is it possible for you to recommend a psychologist or psychiatrist who might accept my medical insurance. I believe I need to see someone, I might be dealing with depression and I think its better to get a professional assessment.     Thank you  Lisa Ge

## 2022-11-17 NOTE — TELEPHONE ENCOUNTER
DR Dede Brand, I HAVE PRINTED A COUPLE OF LIST AND WILL PLACE IT ON YOUR KEYBOARD AT THE COUNTER TOP. PLEASE LET ME KNOW IF YOU NEED ANYTHING FURTHER. I WILL ALSO ATTACH A COPY TO THIS MESSAGE SO YOU CAN COPY AND PASTE YOUR RECOMMENDATIONS.   THE [facility]    Psychologist / 1 Ella Giordano CNS  Adults, Families, Couples, Children                          Adults, Families, and Children   [place] [facility]                                                              [place] Based   [facility]                                                        [phone]      [facility]                         Professional Pastoral   [facility]ecilia Pascual [facility], [address] [facility], Families, Couples,                                       Adults, Couples, Families  [place] [facility]                                                              [place] [facility]   [address] [place] [address]   [phone] [address] [facility]  and Counseling Services                                         [phone]  [place] and [address] [facility], Families                                                    Dr. [facility]   Psychiatry and Counseling                                    [place]   [phone]                                                        [place] Based                                                                                 [phone]  [facility]                                Psychologists and Psychiatry                                Sliding Scale for Uninsured:   [place] [facility] [address]                                                        Harolyn Held                                                                                 [phone]    [facility]                               Non- Professional Free   Individual, Family, Couple                                       Services:   [facility] DR NANCY MURO and [address] [facility] [phone]  [address]                                                                   [facility]   Adults, Couples, and Families                                 [phone]  [place] Based   [address] [place] [address]   [phone]

## 2022-11-25 DIAGNOSIS — I10 ESSENTIAL HYPERTENSION: ICD-10-CM

## 2022-11-28 RX ORDER — LISINOPRIL 20 MG/1
TABLET ORAL
Qty: 45 TABLET | Refills: 0 | Status: SHIPPED | OUTPATIENT
Start: 2022-11-28

## 2022-12-02 DIAGNOSIS — I10 ESSENTIAL HYPERTENSION: ICD-10-CM

## 2022-12-05 ENCOUNTER — PATIENT MESSAGE (OUTPATIENT)
Dept: FAMILY MEDICINE CLINIC | Age: 52
End: 2022-12-05

## 2022-12-05 DIAGNOSIS — K22.10 ULCER OF ESOPHAGUS WITHOUT BLEEDING: ICD-10-CM

## 2022-12-05 RX ORDER — PANTOPRAZOLE SODIUM 40 MG/1
TABLET, DELAYED RELEASE ORAL
Qty: 180 TABLET | Refills: 0 | Status: SHIPPED | OUTPATIENT
Start: 2022-12-05

## 2022-12-05 NOTE — TELEPHONE ENCOUNTER
From: Sandro Rai  To: Dr. Conner Blood: 12/5/2022 7:13 AM EST  Subject: Re-fill    My Pantoprazol prescription needs refilled. I have no more refills, thats why I am reaching out to you. My insurance company will not approve a 60 count of this drug for whatever reason. So, it must be for 30 twice a day.     Thank you   Katy Babinski

## 2023-01-17 ENCOUNTER — OFFICE VISIT (OUTPATIENT)
Dept: FAMILY MEDICINE CLINIC | Age: 53
End: 2023-01-17
Payer: MEDICARE

## 2023-01-17 ENCOUNTER — OFFICE VISIT (OUTPATIENT)
Dept: PULMONOLOGY | Age: 53
End: 2023-01-17
Payer: MEDICARE

## 2023-01-17 VITALS
RESPIRATION RATE: 16 BRPM | HEIGHT: 72 IN | SYSTOLIC BLOOD PRESSURE: 132 MMHG | WEIGHT: 225 LBS | HEART RATE: 64 BPM | DIASTOLIC BLOOD PRESSURE: 80 MMHG | OXYGEN SATURATION: 100 % | BODY MASS INDEX: 30.48 KG/M2

## 2023-01-17 VITALS
WEIGHT: 225 LBS | DIASTOLIC BLOOD PRESSURE: 80 MMHG | HEIGHT: 72 IN | BODY MASS INDEX: 30.48 KG/M2 | HEART RATE: 91 BPM | SYSTOLIC BLOOD PRESSURE: 150 MMHG | OXYGEN SATURATION: 98 %

## 2023-01-17 DIAGNOSIS — G47.33 OBSTRUCTIVE SLEEP APNEA SYNDROME: Chronic | ICD-10-CM

## 2023-01-17 DIAGNOSIS — F41.9 ANXIETY: ICD-10-CM

## 2023-01-17 DIAGNOSIS — Q04.8 CEREBELLAR TONSILLAR ECTOPIA (HCC): ICD-10-CM

## 2023-01-17 DIAGNOSIS — E78.00 HYPERCHOLESTEROLEMIA: ICD-10-CM

## 2023-01-17 DIAGNOSIS — E66.09 CLASS 1 OBESITY DUE TO EXCESS CALORIES WITH SERIOUS COMORBIDITY AND BODY MASS INDEX (BMI) OF 30.0 TO 30.9 IN ADULT: Chronic | ICD-10-CM

## 2023-01-17 DIAGNOSIS — D50.0 IRON DEFICIENCY ANEMIA DUE TO CHRONIC BLOOD LOSS: ICD-10-CM

## 2023-01-17 DIAGNOSIS — I10 ESSENTIAL HYPERTENSION: Chronic | ICD-10-CM

## 2023-01-17 DIAGNOSIS — R42 EPISODIC LIGHTHEADEDNESS: Primary | ICD-10-CM

## 2023-01-17 DIAGNOSIS — E78.6 LOW HDL (UNDER 40): ICD-10-CM

## 2023-01-17 DIAGNOSIS — I10 ESSENTIAL HYPERTENSION: ICD-10-CM

## 2023-01-17 DIAGNOSIS — J32.2 CHRONIC ETHMOIDAL SINUSITIS: ICD-10-CM

## 2023-01-17 DIAGNOSIS — J45.901 MODERATE ASTHMA WITH ACUTE EXACERBATION, UNSPECIFIED WHETHER PERSISTENT: ICD-10-CM

## 2023-01-17 DIAGNOSIS — F41.9 ANXIETY: Chronic | ICD-10-CM

## 2023-01-17 PROBLEM — E66.811 CLASS 1 OBESITY DUE TO EXCESS CALORIES WITH SERIOUS COMORBIDITY AND BODY MASS INDEX (BMI) OF 30.0 TO 30.9 IN ADULT: Chronic | Status: ACTIVE | Noted: 2020-09-14

## 2023-01-17 PROCEDURE — G8427 DOCREV CUR MEDS BY ELIG CLIN: HCPCS | Performed by: FAMILY MEDICINE

## 2023-01-17 PROCEDURE — 3078F DIAST BP <80 MM HG: CPT | Performed by: FAMILY MEDICINE

## 2023-01-17 PROCEDURE — 3079F DIAST BP 80-89 MM HG: CPT | Performed by: INTERNAL MEDICINE

## 2023-01-17 PROCEDURE — G8417 CALC BMI ABV UP PARAM F/U: HCPCS | Performed by: FAMILY MEDICINE

## 2023-01-17 PROCEDURE — G8482 FLU IMMUNIZE ORDER/ADMIN: HCPCS | Performed by: FAMILY MEDICINE

## 2023-01-17 PROCEDURE — 99214 OFFICE O/P EST MOD 30 MIN: CPT | Performed by: FAMILY MEDICINE

## 2023-01-17 PROCEDURE — 1036F TOBACCO NON-USER: CPT | Performed by: INTERNAL MEDICINE

## 2023-01-17 PROCEDURE — 3074F SYST BP LT 130 MM HG: CPT | Performed by: FAMILY MEDICINE

## 2023-01-17 PROCEDURE — 3075F SYST BP GE 130 - 139MM HG: CPT | Performed by: INTERNAL MEDICINE

## 2023-01-17 PROCEDURE — 99214 OFFICE O/P EST MOD 30 MIN: CPT | Performed by: INTERNAL MEDICINE

## 2023-01-17 PROCEDURE — 3017F COLORECTAL CA SCREEN DOC REV: CPT | Performed by: INTERNAL MEDICINE

## 2023-01-17 PROCEDURE — G8417 CALC BMI ABV UP PARAM F/U: HCPCS | Performed by: INTERNAL MEDICINE

## 2023-01-17 PROCEDURE — 3017F COLORECTAL CA SCREEN DOC REV: CPT | Performed by: FAMILY MEDICINE

## 2023-01-17 PROCEDURE — 1036F TOBACCO NON-USER: CPT | Performed by: FAMILY MEDICINE

## 2023-01-17 PROCEDURE — G8482 FLU IMMUNIZE ORDER/ADMIN: HCPCS | Performed by: INTERNAL MEDICINE

## 2023-01-17 PROCEDURE — G8427 DOCREV CUR MEDS BY ELIG CLIN: HCPCS | Performed by: INTERNAL MEDICINE

## 2023-01-17 RX ORDER — BUDESONIDE AND FORMOTEROL FUMARATE DIHYDRATE 160; 4.5 UG/1; UG/1
AEROSOL RESPIRATORY (INHALATION)
Qty: 33 G | Refills: 1 | Status: SHIPPED | OUTPATIENT
Start: 2023-01-17

## 2023-01-17 RX ORDER — ALBUTEROL SULFATE 90 UG/1
AEROSOL, METERED RESPIRATORY (INHALATION)
Qty: 54 G | Refills: 1 | Status: SHIPPED | OUTPATIENT
Start: 2023-01-17

## 2023-01-17 RX ORDER — CLONAZEPAM 0.5 MG/1
0.5 TABLET ORAL DAILY
Qty: 30 TABLET | Refills: 2 | Status: SHIPPED | OUTPATIENT
Start: 2023-01-17 | End: 2023-04-17

## 2023-01-17 SDOH — ECONOMIC STABILITY: FOOD INSECURITY: WITHIN THE PAST 12 MONTHS, THE FOOD YOU BOUGHT JUST DIDN'T LAST AND YOU DIDN'T HAVE MONEY TO GET MORE.: PATIENT DECLINED

## 2023-01-17 SDOH — ECONOMIC STABILITY: FOOD INSECURITY: WITHIN THE PAST 12 MONTHS, YOU WORRIED THAT YOUR FOOD WOULD RUN OUT BEFORE YOU GOT MONEY TO BUY MORE.: PATIENT DECLINED

## 2023-01-17 ASSESSMENT — SLEEP AND FATIGUE QUESTIONNAIRES
HOW LIKELY ARE YOU TO NOD OFF OR FALL ASLEEP WHILE SITTING AND READING: 0
HOW LIKELY ARE YOU TO NOD OFF OR FALL ASLEEP WHILE SITTING INACTIVE IN A PUBLIC PLACE: 0
ESS TOTAL SCORE: 0
HOW LIKELY ARE YOU TO NOD OFF OR FALL ASLEEP WHILE SITTING AND TALKING TO SOMEONE: 0
HOW LIKELY ARE YOU TO NOD OFF OR FALL ASLEEP IN A CAR, WHILE STOPPED FOR A FEW MINUTES IN TRAFFIC: 0
HOW LIKELY ARE YOU TO NOD OFF OR FALL ASLEEP WHILE LYING DOWN TO REST IN THE AFTERNOON WHEN CIRCUMSTANCES PERMIT: 0
HOW LIKELY ARE YOU TO NOD OFF OR FALL ASLEEP WHILE SITTING QUIETLY AFTER LUNCH WITHOUT ALCOHOL: 0
HOW LIKELY ARE YOU TO NOD OFF OR FALL ASLEEP WHILE WATCHING TV: 0
HOW LIKELY ARE YOU TO NOD OFF OR FALL ASLEEP WHEN YOU ARE A PASSENGER IN A CAR FOR AN HOUR WITHOUT A BREAK: 0

## 2023-01-17 ASSESSMENT — SOCIAL DETERMINANTS OF HEALTH (SDOH): HOW HARD IS IT FOR YOU TO PAY FOR THE VERY BASICS LIKE FOOD, HOUSING, MEDICAL CARE, AND HEATING?: PATIENT DECLINED

## 2023-01-17 ASSESSMENT — PATIENT HEALTH QUESTIONNAIRE - PHQ9: DEPRESSION UNABLE TO ASSESS: PT REFUSES

## 2023-01-17 NOTE — LETTER
[facility]  [address] [facility]  [address] [id number]  Phone: [phone]  Fax: [phone]           Marv Little MD      January 17, 2023     Patient: Jorgito Choe   MR Number: [id number]   YOB: 1970   Date of Visit: 1/17/2023       Dear Dr. Maribel Barrios: Thank you for referring Alona Castañeda to me for evaluation/treatment. Below are the relevant portions of my assessment and plan of care. 1. Obstructive sleep apnea syndrome  Assessment & Plan:  One or more undiagnosed new problem with uncertain prognosis till final diagnosis is made. Differential diagnosis includes but not limited to: GREG, PLMD's, narcolepsy, parasomnias. Reviewed GREG (which is the highest likelihood diagnosis): pathophysiology, diagnosis, complications and treatment. Instructed him not to drive if drowsy. Continue medications per his PCP and other physicians. Limit caffeine use after 3pm. Will do PSG to rule-out GREG and other sleep disorders. 1 wk follow up after study to review his results. 2. Essential hypertension  Assessment & Plan:  Chronic- Stable. Discussed the importance of treating sleep apnea as part of the management of this disorder. Cont any meds per PCP and other physicians. 3. Anxiety  Assessment & Plan:  Chronic- Stable. Discussed the importance of treating sleep apnea as part of the management of this disorder. Cont any meds per PCP and other physicians. 4. Class 1 obesity due to excess calories with serious comorbidity and body mass index (BMI) of 30.0 to 30.9 in adult  Assessment & Plan:  Chronic-not stable:  Discussed importance of treating obstructive sleep apnea and getting sufficient sleep to assist with weight control. Encouraged him to work on weight loss through diet and exercise. Recommended DASH or Mediterranean diets. Reviewed, analyzed, and documented physiologic data from patient's PAP machine.     This information was analyzed to assess complexity and medical decision making in regards to further testing and management. Diagnoses of Obstructive sleep apnea syndrome, Essential hypertension, Anxiety, and Class 1 obesity due to excess calories with serious comorbidity and body mass index (BMI) of 30.0 to 30.9 in adult were pertinent to this visit. The chronic medical conditions listed are directly related to the primary diagnosis listed above. The management of the primary diagnosis affects the secondary diagnosis and vice versa. If you have questions, please do not hesitate to call me. I look forward to following Jason Celeste along with you.     Sincerely,        Radhames Ryan MD    CC providers:  Caren Mae MD  [address] [place] [address]  [phone]  [address] [place] [address]  Via In [address]  [phone]  Via In [facility]

## 2023-01-17 NOTE — PATIENT INSTRUCTIONS
Yaneth King was seen today for anxiety, hypertension and gastroesophageal reflux. Diagnoses and all orders for this visit:    Episodic lightheadedness  Hx of vertigo- Cause? Create history for ENT. Essential hypertension  -     Good control.  -     Continue meds and lifestyle control. Moderate asthma with acute exacerbation, unspecified whether persistent  -     Good control.  -     Continue meds and lifestyle control. Cerebellar tonsillar ectopia ([address]  Neuro felt symptoms not due to this. Chronic ethmoidal sinusitis  Prepare history for ENT.

## 2023-01-17 NOTE — ASSESSMENT & PLAN NOTE
One or more undiagnosed new problem with uncertain prognosis till final diagnosis is made.  Differential diagnosis includes but not limited to: GREG, PLMD's, narcolepsy, parasomnias.  Reviewed GREG (which is the highest likelihood diagnosis): pathophysiology, diagnosis, complications and treatment. Instructed him not to drive if drowsy. Continue medications per his PCP and other physicians. Limit caffeine use after 3pm. Will do PSG to rule-out GREG and other sleep disorders. 1 wk follow up after study to review his results.

## 2023-01-17 NOTE — PROGRESS NOTES
Jesse Barraza [facility]  [address]  [address]  [place]  [address]  P- [phone]   Faisal  [address]  [place] [address]  F- [phone]     [facility] PHYSICIANS [place] [facility] SLEEP MEDICINE  [address] [facility] [id number] [phone]      Assessment/Plan:      1. Obstructive sleep apnea syndrome  Assessment & Plan:  One or more undiagnosed new problem with uncertain prognosis till final diagnosis is made.  Differential diagnosis includes but not limited to: GERG, PLMD's, narcolepsy, parasomnias.  Reviewed GREG (which is the highest likelihood diagnosis): pathophysiology, diagnosis, complications and treatment. Instructed him not to drive if drowsy. Continue medications per his PCP and other physicians. Limit caffeine use after 3pm. Will do PSG to rule-out GREG and other sleep disorders. 1 wk follow up after study to review his results.  2. Essential hypertension  Assessment & Plan:  Chronic- Stable.  Discussed the importance of treating sleep apnea as part of the management of this disorder.  Cont any meds per PCP and other physicians.   3. Anxiety  Assessment & Plan:  Chronic- Stable.  Discussed the importance of treating sleep apnea as part of the management of this disorder.  Cont any meds per PCP and other physicians.   4. Class 1 obesity due to excess calories with serious comorbidity and body mass index (BMI) of 30.0 to 30.9 in adult  Assessment & Plan:  Chronic-not stable:  Discussed importance of treating obstructive sleep apnea and getting sufficient sleep to assist with weight control.  Encouraged him to work on weight loss through diet and exercise. Recommended DASH or Mediterranean diets.      Reviewed the following labs from 3/8/2022 which were essentially normal-CBC, CMP, and vitamin D    Reviewed, analyzed, and documented physiologic data from patient's PAP machine.    This information was  analyzed to assess complexity and medical decision making in regards to further testing and management. Diagnoses of Obstructive sleep apnea syndrome, Essential hypertension, Anxiety, and Class 1 obesity due to excess calories with serious comorbidity and body mass index (BMI) of 30.0 to 30.9 in adult were pertinent to this visit. The chronic medical conditions listed are directly related to the primary diagnosis listed above. The management of the primary diagnosis affects the secondary diagnosis and vice versa. Subjective:   Subjective   Patient ID: Suki Escalante is a 47 y.o. male. Chief Complaint   Patient presents with    Snoring       HPI:  Was seen 2 years ago was post have a sleep study but because of COVID could never get it done. Patient has snoring, nonrestorative sleep, daytime sleepiness, and daytime napping. Crockett - Crockett Sleepiness Score: 0    Social History     Socioeconomic History    Marital status: Single     Spouse name: Not on file    Number of children: 2    Years of education: 12    Highest education level: High school graduate   Occupational History    Occupation: FACTORY IN PAST     Comment: unemployed 11/2018   Tobacco Use    Smoking status: Former     Packs/day: 1.50     Years: 35.00     Pack years: 52.50     Types: Cigarettes     Start date: 1/1/1988     Quit date: 5/1/2019     Years since quitting: 3.7    Smokeless tobacco: Former     Types: Snuff    Tobacco comments:     started to smoke at 16 / smoked up to 1.5 ppd / smoked for 35 yrs on and off / 1 PPD (QUIT [id number])   Vaping Use    Vaping Use: Never used   Substance and Sexual Activity    Alcohol use: No     Comment: rare alcohol    Drug use: Not Currently     Types: Marijuana Elfida Yamil)     Comment: was daily. None since 9/2018    Sexual activity: Yes   Other Topics Concern    Not on file   Social History Narrative    Exercise - too light headed. 3/26/19. None. 1/15/20.  Walks every am about 1.5 mi 7 days/wk. 4/14/20 2.3 mi every day. 5/4/20. Walks 2 mi/day. 7/15/20. Was having anxiety attacks while walking his dog so stopped for 2 months. 10/6/20. Walking 1.5 mi 7 day/wk. 8/24/21. 1.5-1.75 mi/day x7 day/wk. 12/7/21. Also Does 90 min 7 days core body. 3/8/22. Walks 2 mi/day 7 day/wk. 7/11/22.10/17/22.1/16/23.      Social Determinants of Health     Financial Resource Strain: Unknown    Difficulty of Paying Living Expenses: Patient refused   Food Insecurity: Unknown    Worried About Running Out of Food in the Last Year: Patient refused    Ran Out of Food in the Last Year: Patient refused   Transportation Needs: Unknown    Lack of Transportation (Medical): Patient refused    Lack of Transportation (Non-Medical): Patient refused   Physical Activity: Not on file   Stress: Not on file   Social Connections: Not on file   Intimate Partner Violence: Not on file   Housing Stability: Not on file       Current Outpatient Medications   Medication Instructions    albuterol sulfate HFA (PROVENTIL;VENTOLIN;PROAIR) 108 (90 Base) MCG/ACT inhaler INHALE 2 PUFFS BY MOUTH EVERY 4 HOURS AS NEEDED FOR WHEEZING OR SHORTNESS OF BREATH    azelastine (ASTELIN) 0.1 % nasal spray 1 spray, Nasal, 2 TIMES DAILY, Use in each nostril as directed    Cholecalciferol (VITAMIN D3) 50 MCG (2000 UT) CAPS 1 capsule, Oral, DAILY    clonazePAM (KLONOPIN) 0.5 mg, Oral, DAILY    fluticasone (FLONASE) 50 MCG/ACT nasal spray 2 sprays, Each Nostril, DAILY    lisinopril (PRINIVIL;ZESTRIL) 20 MG tablet Take 1/2 (one-half) tablet by mouth once daily    metoprolol tartrate (LOPRESSOR) 25 MG tablet Take 1 tablet by mouth twice daily    montelukast (SINGULAIR) 10 mg, Oral, NIGHTLY    Multiple Vitamins-Minerals (THERAPEUTIC MULTIVITAMIN-MINERALS) tablet 1 tablet, Oral, DAILY    Omega-3 Fatty Acids (OMEGA 3 PO) 1 capsule, Oral, DAILY BEFORE BREAKFAST    pantoprazole (PROTONIX) 40 MG tablet Take 1 tablet by mouth twice daily    SYMBICORT 160-4.5 MCG/ACT AERO Inhale 2 puffs by mouth twice daily    topiramate (TOPAMAX) 25 MG tablet Take 1 tablet in the morning and 2 tablets at night    vitamin C (ASCORBIC ACID) 500 mg, Oral, 2 TIMES DAILY    Zinc Acetate-Sweetleaf (FP ZINC LOZENGES PO) 0.5 lozenges, Oral, 2 TIMES DAILY          Vitals:  Weight BMI   Wt Readings from Last 3 Encounters:   01/17/23 225 lb (102.1 kg)   01/17/23 225 lb (102.1 kg)   10/31/22 219 lb (99.3 kg)    Body mass index is 30.52 kg/m².      BP HR SaO2   BP Readings from Last 3 Encounters:   01/17/23 (!) 150/80   01/17/23 132/80   11/14/22 (!) 162/90    Pulse Readings from Last 3 Encounters:   01/17/23 91   01/17/23 64   11/14/22 82    SpO2 Readings from Last 3 Encounters:   01/17/23 98%   01/17/23 100%   09/21/22 98%        Electronically signed by Marv Little MD on 1/17/2023 at 11:06 AM

## 2023-01-17 NOTE — PROGRESS NOTES
Jorgito Choe (:  1970) is a 47 y.o. male,Established patient, here for evaluation of the following chief complaint(s): Anxiety (ANXIETY ROUTINE FOLLOW UP, DEPRESSION SCREEN AND SOCIAL DETERMINANTS DUE. DUE FOR COLONOSCOPY.), Hypertension (HTN ROUTINE FOLLOW UP), and Gastroesophageal Reflux (GERD ROUTINE FOLLOW UP)       ASSESSMENT/PLAN:  845    Patient Instructions   Didi Vargas was seen today for anxiety, hypertension and gastroesophageal reflux. Diagnoses and all orders for this visit:    Episodic lightheadedness  Hx of vertigo- Cause? Create history for ENT. Essential hypertension  -     Good control.  -     Continue meds and lifestyle control. Moderate asthma with acute exacerbation, unspecified whether persistent  -     Good control.  -     Continue meds and lifestyle control. Cerebellar tonsillar ectopia ([address]  Neuro felt symptoms not due to this. Chronic ethmoidal sinusitis  Prepare history for ENT. Fasting labs. ENT referral for a 2nd opinion if unable to resolve sinus issue with current ENT. Return in about 3 months (around 2023) for Hypertension, Cholesterol, Anxiety. Subjective   SUBJECTIVE/OBJECTIVE:  Chief Complaint   Patient presents with    Anxiety     ANXIETY ROUTINE FOLLOW UP, DEPRESSION SCREEN AND SOCIAL DETERMINANTS DUE. DUE FOR COLONOSCOPY. Hypertension     HTN ROUTINE FOLLOW UP    Gastroesophageal Reflux     GERD ROUTINE FOLLOW UP   808  HPI    Episodic lightheadedness  He thinks it is vertigo. This is a symptom that bothers him the most.  He cannot describe it. He has seen neuro about this. It thought it was an atypical migraine and gave him topiramate. They also recommended he see a sleep specialist as we had suggested as well. He has seen the sleep specialist who recommended a sleep study. Sleep study is pending. He does not think he has obstructive sleep apnea.   Explained that it is impossible for him to tell if he stops breathing while asleep since he cannot monitor it. Also most people will not think to have sleep apnea because they simply do not want to believe it is that would mean they would need to wear a mask. Essential hypertension  Is not checking at home. No added salt nor salty snacks. Unsure if SEs with BP med but all his sx did start. Moderate asthma with acute exacerbation, unspecified whether persistent  Stable. Uses symbicort routinely and Albuterol Prn. New insurance will give. Cerebellar tonsillar ectopia ([facility])  Unsure if related to sx. Neuro did not think so. Sinuses  Seen by ENT for sinus problems. Even though he thinks he has vertigo he believes some of this somehow relates to his sinuses. Explained that postnasal drip can cause worsening vertigo by affecting eustachian tube function. Review of Systems    Past Medical History:   Diagnosis Date    Anxiety     Asthma 01/01/2000    late 20's-early 30s. Cerebellar tonsillar ectopia ([facility] [address] 5/4/2021    Class 1 obesity due to excess calories without serious comorbidity with body mass index (BMI) of 31.0 to 31.9 in adult 9/14/2020    Closed fracture of head of fifth metacarpal     Boxer's fracture    Former smoker 05/2019    started age 17, quit age 53    Fracture of finger of right hand     And knuckle    GI bleed 6/10/2019    History of blood transfusion     new this admission 6/10/19    Hypertension     Iron deficiency anemia due to chronic blood loss 10/31/2019    DUE TO GI BLEED/ULCERS    Lumbar disc disease 01/01/1994    L4-5 proteuding disc    Peptic ulcer 01/01/2015    EGD 3/2/2020: Distal circumferentialesophageal ulcer and distal Parra's. Gastric body and antrum gastritis    Seasonal allergic rhinitis     Tinnitus of both ears 1/1/2017    Loss of hearing in left. Ulcer of esophagus without bleeding 5/5/2020       Past Surgical History:   Procedure Laterality Date    COLONOSCOPY  03/2015    1 benign polyp. Fernando/Clyde Ricketts. Due 5082-4624. ENDOSCOPY, COLON, DIAGNOSTIC      LUMBAR SPINE SURGERY  01/01/2002    epidural steroids x3    NASAL ENDOSCOPY  03/11/2020    UPPER GASTROINTESTINAL ENDOSCOPY  3/2015    3 ulcers - peptic and esophageal    UPPER GASTROINTESTINAL ENDOSCOPY  06/2019    +ulcers    UPPER GASTROINTESTINAL ENDOSCOPY N/A 06/11/2019    EGD DIAGNOSTIC ONLY performed by Yoli Moss MD at [place] [address]  08/19/2019    Dr. Miriam Herzog, ulcer healing in esophagitis, Parra's 1    UPPER GASTROINTESTINAL ENDOSCOPY N/A 01/06/2020    EGD CONTROL HEMORRHAGE performed by Theodora López MD at [address] 01/06/2020    EGD SUBMUCOSAL/ INJECTION OF EPINEPHRINE performed by Theodora López MD at [place] [address] N/A 03/02/2020    EGD BIOPSY performed by Christopher Campa MD at [address] [place] [address]  01/01/1983    and 1989       Social History     Socioeconomic History    Marital status: Single     Spouse name: Not on file    Number of children: 2    Years of education: 12    Highest education level: High school graduate   Occupational History    Occupation: FACTORY IN PAST     Comment: unemployed 11/2018   Tobacco Use    Smoking status: Former     Packs/day: 1.50     Years: 35.00     Pack years: 52.50     Types: Cigarettes     Start date: 1/1/1988     Quit date: 5/1/2019     Years since quitting: 3.7    Smokeless tobacco: Former     Types: Snuff    Tobacco comments:     started to smoke at 16 / smoked up to 1.5 ppd / smoked for 35 yrs on and off / 1 PPD (QUIT [id number])   Vaping Use    Vaping Use: Never used   Substance and Sexual Activity    Alcohol use: No     Comment: rare alcohol    Drug use: Not Currently     Types: Marijuana Fronalice Cezar)     Comment: was daily. None since 9/2018    Sexual activity: Yes   Other Topics Concern    Not on file   Social History Narrative    Exercise - too light headed. 3/26/19. None. 1/15/20. Walks every am about 1.5 mi 7 days/wk. 4/14/20 2.3 mi every day. 5/4/20. Walks 2 mi/day. 7/15/20. Was having anxiety attacks while walking his dog so stopped for 2 months. 10/6/20. Walking 1.5 mi 7 day/wk. 8/24/21. 1.5-1.75 mi/day x7 day/wk. 12/7/21. Also Does 90 min 7 days core body. 3/8/22. Walks 2 mi/day 7 day/wk. 7/11/22.10/17/22.1/16/23. Social Determinants of Health     Financial Resource Strain: Unknown    Difficulty of Paying Living Expenses: Patient refused   Food Insecurity: Unknown    Worried About Running Out of Food in the Last Year: Patient refused    Ran Out of Food in the Last Year: Patient refused   Transportation Needs: Unknown    Lack of Transportation (Medical): Patient refused    Lack of Transportation (Non-Medical): Patient refused   Physical Activity: Not on file   Stress: Not on file   Social Connections: Not on file   Intimate Partner Violence: Not on file   Housing Stability: Not on file       Family History   Problem Relation Age of Onset    Other Mother         gall bladder    Allergies Mother     Heart Attack Father 52        x3-4    Coronary Art Dis Father     Alcohol Abuse Father     Allergies Father     Other Brother         congenital calcium deposits n bones, COVID-19@ 48. Allergies Brother     Hearing Loss Brother         congenital    Allergy (Severe) Brother     No Known Problems Brother         in a wreck as teen    Cancer Maternal Grandmother     Diabetes type 2  Maternal Grandmother     Cancer Maternal Grandfather     Asthma Paternal Grandfather     No Known Problems Son     No Known Problems Son     Cancer Maternal Aunt         brain    Other Maternal Aunt         lung issues    Heart Attack Paternal Aunt 46    Coronary Art Dis Paternal Aunt     Cancer Paternal Aunt         ? lung?     Lung Cancer Paternal Aunt     Lung Cancer Paternal Aunt     Lung Cancer Paternal Uncle        No Known Allergies    Current Outpatient Medications   Medication Sig Dispense Refill pantoprazole (PROTONIX) 40 MG tablet Take 1 tablet by mouth twice daily 180 tablet 0    metoprolol tartrate (LOPRESSOR) 25 MG tablet Take 1 tablet by mouth twice daily 180 tablet 0    lisinopril (PRINIVIL;ZESTRIL) 20 MG tablet Take 1/2 (one-half) tablet by mouth once daily 45 tablet 0    topiramate (TOPAMAX) 25 MG tablet Take 1 tablet in the morning and 2 tablets at night 270 tablet 1    SYMBICORT 160-4.5 MCG/ACT AERO Inhale 2 puffs by mouth twice daily 11 g 0    albuterol sulfate HFA (PROVENTIL;VENTOLIN;PROAIR) 108 (90 Base) MCG/ACT inhaler INHALE 2 PUFFS BY MOUTH EVERY 4 HOURS AS NEEDED FOR WHEEZING OR SHORTNESS OF BREATH 18 g 0    azelastine (ASTELIN) 0.1 % nasal spray 1 spray by Nasal route 2 times daily Use in each nostril as directed 30 mL 5    montelukast (SINGULAIR) 10 MG tablet Take 1 tablet by mouth nightly 30 tablet 11    fluticasone (FLONASE) 50 MCG/ACT nasal spray 2 sprays by Each Nostril route daily 48 g 1    Omega-3 Fatty Acids (OMEGA 3 PO) Take 1 capsule by mouth every morning (before breakfast) Indications: Decreased HDL Cholesterol      Zinc Acetate-Sweetleaf (FP ZINC LOZENGES PO) Take 0.5 lozenges by mouth 2 times daily       vitamin C (ASCORBIC ACID) 500 MG tablet Take 500 mg by mouth 2 times daily      Cholecalciferol (VITAMIN D3) 50 MCG (2000 UT) CAPS Take 1 capsule by mouth daily       Multiple Vitamins-Minerals (THERAPEUTIC MULTIVITAMIN-MINERALS) tablet Take 1 tablet by mouth daily      clonazePAM (KLONOPIN) 0.5 MG tablet Take 1 tablet by mouth daily for 90 days. 30 tablet 2     No current facility-administered medications for this visit.           Objective   Vitals:    01/17/23 0801   BP: 132/80   Site: Left Upper Arm   Position: Sitting   Cuff Size: Medium Adult   Pulse: 64   Resp: 16   SpO2: 100%   Weight: 225 lb (102.1 kg)   Height: 6' (1.829 m)     BP Readings from Last 3 Encounters:   01/17/23 132/80   11/14/22 (!) 162/90   10/31/22 139/81     Pulse Readings from Last 3 Encounters: 01/17/23 64   11/14/22 82   10/31/22 67     Wt Readings from Last 3 Encounters:   01/17/23 225 lb (102.1 kg)   10/31/22 219 lb (99.3 kg)   10/17/22 219 lb (99.3 kg)     Body mass index is 30.52 kg/m². Physical Exam  Vitals and nursing note reviewed. Constitutional:       General: He is not in acute distress. Appearance: He is well-developed. He is not diaphoretic. Eyes:      General: No scleral icterus. Right eye: No discharge. Left eye: No discharge. Conjunctiva/sclera: Conjunctivae normal.   Neck:      Thyroid: No thyroid mass or thyromegaly. Vascular: No carotid bruit or JVD. Trachea: Trachea and phonation normal. No tracheal tenderness or tracheal deviation. Cardiovascular:      Rate and Rhythm: Normal rate and regular rhythm. Heart sounds: Normal heart sounds, S1 normal and S2 normal. Heart sounds not distant. No murmur heard. No friction rub. No gallop. No S3 or S4 sounds. Pulmonary:      Effort: Pulmonary effort is normal. No respiratory distress. Breath sounds: Normal breath sounds. No stridor. No decreased breath sounds, wheezing, rhonchi or rales. Musculoskeletal:      Cervical back: Neck supple. Lymphadenopathy:      Head:      Right side of head: No submandibular or tonsillar adenopathy. Left side of head: No submandibular or tonsillar adenopathy. Cervical: No cervical adenopathy. Right cervical: No superficial, deep or posterior cervical adenopathy. Left cervical: No superficial, deep or posterior cervical adenopathy. Skin:     General: Skin is warm and dry. Coloration: Skin is not pale. Neurological:      Mental Status: He is alert. Deep Tendon Reflexes:      Reflex Scores:       Patellar reflexes are 2+ on the right side and 2+ on the left side.   Psychiatric:         Attention and Perception: Attention and perception normal.         Mood and Affect: Mood and affect normal.         Speech: Speech normal. Behavior: Behavior normal. Behavior is cooperative. Cognition and Memory: Cognition and memory normal.         Judgment: Judgment normal.     MRI OF THE BRAIN WITHOUT CONTRAST  10/19/2022 7:27 am  COMPARISON:  None. HISTORY:  ORDERING SYSTEM PROVIDED HISTORY: Episodic lightheadedness  TECHNOLOGIST PROVIDED HISTORY:  Reason for exam:->lightheadedness  What is the sedation requirement?->None  Reason for Exam: PT HAS A HX OF PRESSURE BEHIND EYES/ HEADACHES, DIZZINESS  FOR ABOUT 7 MONTHS  Additional signs and symptoms: PT HAS A HX OF PRESSURE BEHIND EYES/  HEADACHES, DIZZINESS FOR ABOUT 7 MONTHS  Relevant Medical/Surgical History: PT HAS A HX OF PRESSURE BEHIND EYES/  HEADACHES, DIZZINESS FOR ABOUT 7 MONTHS     Initial evaluation. FINDINGS:  INTRACRANIAL STRUCTURES/VENTRICLES: There is no acute infarct. No mass effect  or midline shift. No evidence of an acute intracranial hemorrhage. The  ventricles and sulci are normal in size and configuration. The  sellar/suprasellar regions appear unremarkable. The normal signal voids  within the major intracranial vessels appear maintained. There is minimal  cerebellar tonsillar ectopia. ORBITS: The visualized portion of the orbits demonstrate no acute abnormality. SINUSES: There is a large mucous retention cyst within the left maxillary  sinus. Minimal mucosal thickening of the ethmoid sinuses bilaterally. The  mastoid air cells demonstrate no acute abnormality. BONES/SOFT TISSUES: The bone marrow signal intensity appears normal. The soft  tissues demonstrate no acute abnormality. IMPRESSION:  1. No acute intracranial abnormality. No acute infarct. 2. Minimal cerebellar tonsillar ectopia. 3. Large mucous retention cyst in the left maxillary sinus.        On this date 1/17/2023 I have spent 37 minutes reviewing previous notes, test results and face to face with the patient discussing the diagnosis and importance of compliance with the treatment plan as well as documenting on the day of the visit. An electronic signature was used to authenticate this note.     --Daniela Donis, DO

## 2023-01-19 DIAGNOSIS — G47.33 OBSTRUCTIVE SLEEP APNEA SYNDROME: Primary | ICD-10-CM

## 2023-02-02 ENCOUNTER — OFFICE VISIT (OUTPATIENT)
Dept: ENT CLINIC | Age: 53
End: 2023-02-02
Payer: MEDICAID

## 2023-02-02 ENCOUNTER — HOSPITAL ENCOUNTER (OUTPATIENT)
Dept: SLEEP CENTER | Age: 53
Discharge: HOME | End: 2023-02-02
Payer: MEDICAID

## 2023-02-02 VITALS
BODY MASS INDEX: 29.8 KG/M2 | HEART RATE: 94 BPM | HEIGHT: 72 IN | TEMPERATURE: 97.7 F | SYSTOLIC BLOOD PRESSURE: 156 MMHG | OXYGEN SATURATION: 98 % | WEIGHT: 220 LBS | DIASTOLIC BLOOD PRESSURE: 94 MMHG

## 2023-02-02 DIAGNOSIS — R20.0 FACIAL NUMBNESS: Primary | ICD-10-CM

## 2023-02-02 DIAGNOSIS — J30.9 ALLERGIC RHINITIS, UNSPECIFIED SEASONALITY, UNSPECIFIED TRIGGER: ICD-10-CM

## 2023-02-02 DIAGNOSIS — Z91.89 AT RISK FOR OBSTRUCTIVE SLEEP APNEA: ICD-10-CM

## 2023-02-02 DIAGNOSIS — J34.1 MUCOUS RETENTION CYST OF MAXILLARY SINUS: ICD-10-CM

## 2023-02-02 DIAGNOSIS — G47.33 OBSTRUCTIVE SLEEP APNEA SYNDROME: ICD-10-CM

## 2023-02-02 PROCEDURE — 3077F SYST BP >= 140 MM HG: CPT | Performed by: STUDENT IN AN ORGANIZED HEALTH CARE EDUCATION/TRAINING PROGRAM

## 2023-02-02 PROCEDURE — 3080F DIAST BP >= 90 MM HG: CPT | Performed by: STUDENT IN AN ORGANIZED HEALTH CARE EDUCATION/TRAINING PROGRAM

## 2023-02-02 PROCEDURE — 99213 OFFICE O/P EST LOW 20 MIN: CPT | Performed by: STUDENT IN AN ORGANIZED HEALTH CARE EDUCATION/TRAINING PROGRAM

## 2023-02-02 PROCEDURE — 95810 POLYSOM 6/> YRS 4/> PARAM: CPT

## 2023-02-02 NOTE — PROGRESS NOTES
[address]  FOLLOW-UP VISIT      Patient Name: Ponce 45Norah NICOLAS Record Number:  [id number]  Primary Care Physician:  Gabriel Wilkerson DO    ChiefComplaint     Chief Complaint   Patient presents with    Sinus Problem     F/u, still has some numbness from the nose down to front teeth        History of Present Illness     Sandro Rai is an 47 y.o. male previously seen for allergic rhinitis, numbness of his midface, dizziness, migraine, left maxillary sinusitis. Interval History:  Numbness root of nose and encompasses entire nose and upper lip, two front teeth, upper gum area, roof of mouth. On tongue as well. Started in October and has been like this since. Not necessarily too bothersome but wants to make sure this is not a sign of something bigger such as multiple sclerosis. Follow-up with Dr. Weston Callow, next appointment with him is in May. Has sleep study scheduled for tonight. Denies any facial pain, just numbness. No longer with forehead pressure. No mucopurulent rhinorrhea. No nasal congesion. Past Medical History     Past Medical History:   Diagnosis Date    Anxiety     Asthma 01/01/2000    late 20's-early 30s. Cerebellar tonsillar ectopia ([address] 5/4/2021    Class 1 obesity due to excess calories without serious comorbidity with body mass index (BMI) of 31.0 to 31.9 in adult 9/14/2020    Closed fracture of head of fifth metacarpal     Boxer's fracture    Former smoker 05/2019    started age 17, quit age 53    Fracture of finger of right hand     And knuckle    GI bleed 6/10/2019    History of blood transfusion     new this admission 6/10/19    Hypertension     Iron deficiency anemia due to chronic blood loss 10/31/2019    DUE TO GI BLEED/ULCERS    Lumbar disc disease 01/01/1994    L4-5 proteuding disc    Peptic ulcer 01/01/2015    EGD 3/2/2020: Distal circumferentialesophageal ulcer and distal Parra's.   Gastric body and antrum gastritis    Seasonal allergic rhinitis     Tinnitus of both ears 1/1/2017    Loss of hearing in left. Ulcer of esophagus without bleeding 5/5/2020       Past Surgical History     Past Surgical History:   Procedure Laterality Date    COLONOSCOPY  03/2015    1 benign polyp. [address] Due [id number]. ENDOSCOPY, COLON, DIAGNOSTIC      LUMBAR SPINE SURGERY  01/01/2002    epidural steroids x3    NASAL ENDOSCOPY  03/11/2020    UPPER GASTROINTESTINAL ENDOSCOPY  3/2015    3 ulcers - peptic and esophageal    UPPER GASTROINTESTINAL ENDOSCOPY  06/2019    +ulcers    UPPER GASTROINTESTINAL ENDOSCOPY N/A 06/11/2019    EGD DIAGNOSTIC ONLY performed by Lynette Louie MD at [address] [place] [address]  08/19/2019    Dr. Carlos Aleman, ulcer healing in esophagitis, Parra's 1    UPPER GASTROINTESTINAL ENDOSCOPY N/A 01/06/2020    EGD CONTROL HEMORRHAGE performed by Barber Presley MD at [address] [place] [address] N/A 01/06/2020    EGD SUBMUCOSAL/ INJECTION OF EPINEPHRINE performed by Barber Presley MD at [address] [place] [address] N/A 03/02/2020    EGD BIOPSY performed by Sanjay Samaniego MD at [address] [place] [address]  01/01/1983    and 1989       Family History     Family History   Problem Relation Age of Onset    Other Mother         gall bladder    Allergies Mother     Heart Attack Father 52        x3-4    Coronary Art Dis Father     Alcohol Abuse Father     Allergies Father     Other Brother         congenital calcium deposits n bones, COVID-19@ 48.     Allergies Brother     Hearing Loss Brother         congenital    Allergy (Severe) Brother     No Known Problems Brother         in a wreck as teen    Cancer Maternal Grandmother     Diabetes type 2  Maternal Grandmother     Cancer Maternal Grandfather     Asthma Paternal Grandfather     No Known Problems Son     No Known Problems Son     Cancer Maternal Aunt brain    Other Maternal Aunt         lung issues    Heart Attack Paternal Aunt 46    Coronary Art Dis Paternal Aunt     Cancer Paternal Aunt         ? lung? Lung Cancer Paternal Aunt     Lung Cancer Paternal Aunt     Lung Cancer Paternal Uncle     Other Maternal Uncle         carotid stenosis       Social History     Social History     Tobacco Use    Smoking status: Former     Packs/day: 1.50     Years: 35.00     Pack years: 52.50     Types: Cigarettes     Start date: 1/1/1988     Quit date: 5/1/2019     Years since quitting: 3.7    Smokeless tobacco: Former     Types: Snuff    Tobacco comments:     started to smoke at 16 / smoked up to 1.5 ppd / smoked for 35 yrs on and off / 1 PPD (QUIT 4023-9312)   Vaping Use    Vaping Use: Never used   Substance Use Topics    Alcohol use: No     Comment: rare alcohol    Drug use: Not Currently     Types: Marijuana [place])     Comment: was daily. None since 9/2018        Allergies     No Known Allergies    Medications     Current Outpatient Medications   Medication Sig Dispense Refill    SYMBICORT 160-4.5 MCG/ACT AERO Inhale 2 puffs by mouth twice daily 33 g 1    albuterol sulfate HFA (PROVENTIL;VENTOLIN;PROAIR) 108 (90 Base) MCG/ACT inhaler INHALE 2 PUFFS BY MOUTH EVERY 4 HOURS AS NEEDED FOR WHEEZING OR SHORTNESS OF BREATH 54 g 1    clonazePAM (KLONOPIN) 0.5 MG tablet Take 1 tablet by mouth daily for 90 days.  30 tablet 2    pantoprazole (PROTONIX) 40 MG tablet Take 1 tablet by mouth twice daily 180 tablet 0    metoprolol tartrate (LOPRESSOR) 25 MG tablet Take 1 tablet by mouth twice daily 180 tablet 0    lisinopril (PRINIVIL;ZESTRIL) 20 MG tablet Take 1/2 (one-half) tablet by mouth once daily 45 tablet 0    topiramate (TOPAMAX) 25 MG tablet Take 1 tablet in the morning and 2 tablets at night 270 tablet 1    azelastine (ASTELIN) 0.1 % nasal spray 1 spray by Nasal route 2 times daily Use in each nostril as directed 30 mL 5    montelukast (SINGULAIR) 10 MG tablet Take 1 tablet by mouth nightly 30 tablet 11    fluticasone (FLONASE) 50 MCG/ACT nasal spray 2 sprays by Each Nostril route daily 48 g 1    Omega-3 Fatty Acids (OMEGA 3 PO) Take 1 capsule by mouth every morning (before breakfast) Indications: Decreased HDL Cholesterol      Zinc Acetate-Sweetleaf (FP ZINC LOZENGES PO) Take 0.5 lozenges by mouth 2 times daily       vitamin C (ASCORBIC ACID) 500 MG tablet Take 500 mg by mouth 2 times daily      Cholecalciferol (VITAMIN D3) 50 MCG (2000 UT) CAPS Take 1 capsule by mouth daily       Multiple Vitamins-Minerals (THERAPEUTIC MULTIVITAMIN-MINERALS) tablet Take 1 tablet by mouth daily       No current facility-administered medications for this visit. Review of Systems     REVIEW OF SYSTEM: See HPI above    PhysicalExam     Vitals:    02/02/23 0820   BP: (!) 156/94   Pulse: 94   Temp: 97.7 °F (36.5 °C)   TempSrc: Temporal   SpO2: 98%   Weight: 220 lb (99.8 kg)   Height: 6' (1.829 m)       PHYSICAL EXAM  BP (!) 156/94   Pulse 94   Temp 97.7 °F (36.5 °C) (Temporal)   Ht 6' (1.829 m)   Wt 220 lb (99.8 kg)   SpO2 98%   BMI 29.84 kg/m²     GENERAL: No acute distress, alert and oriented. EYES: EOMI, Anti-icteric. NOSE: On anterior rhinoscopy there is no epistaxis, nasal mucosa moist and normal appearing, no purulent drainage.    EARS: Normal external appearance; on portable otomicroscopy:     -Ad: External auditory canal without stenosis, tympanic membrane clear, no middle ear effusions or retractions     -As: External auditory canal without stenosis, tympanic membrane clear, no middle ear effusions or retractions  FACE: HB 1/6 bilaterally, symmetric appearing, sensation equal bilaterally  ORAL CAVITY: No masses or lesions visualized or palpated, uvula is midline, moist mucous membranes, no oropharyngeal masses or oropharyngeal obstruction  NECK: Normal range of motion, no thyromegaly, trachea is midline, no palpable lymphadenopathy or neck masses, no crepitus  CHEST: Normal respiratory effort, breathing comfortably, no retractions  SKIN: No rashes, normal appearing skin, no evidence of skin lesions/tumors  NEURO: Cranial Nerves 2, 3, 4, 5, 6, 7, 11, 12 grossly intact bilaterally     I have performed a head and neck physical exam personally or was physically present during the key or critical portions of the service. Imaging   MRI Result (most recent):  MRI BRAIN WO CONTRAST 10/19/2022    Narrative  EXAMINATION:  MRI OF THE BRAIN WITHOUT CONTRAST  10/19/2022 7:27 am    TECHNIQUE:  Multiplanar multisequence MRI of the brain was performed without the  administration of intravenous contrast.    COMPARISON:  None. HISTORY:  ORDERING SYSTEM PROVIDED HISTORY: Episodic lightheadedness  TECHNOLOGIST PROVIDED HISTORY:  Reason for exam:->lightheadedness  What is the sedation requirement?->None  Reason for Exam: PT HAS A HX OF PRESSURE BEHIND EYES/ HEADACHES, DIZZINESS  FOR ABOUT 7 MONTHS  Additional signs and symptoms: PT HAS A HX OF PRESSURE BEHIND EYES/  HEADACHES, DIZZINESS FOR ABOUT 7 MONTHS  Relevant Medical/Surgical History: PT HAS A HX OF PRESSURE BEHIND EYES/  HEADACHES, DIZZINESS FOR ABOUT 7 MONTHS    Initial evaluation. FINDINGS:  INTRACRANIAL STRUCTURES/VENTRICLES: There is no acute infarct. No mass effect  or midline shift. No evidence of an acute intracranial hemorrhage. The  ventricles and sulci are normal in size and configuration. The  sellar/suprasellar regions appear unremarkable. The normal signal voids  within the major intracranial vessels appear maintained. There is minimal  cerebellar tonsillar ectopia. ORBITS: The visualized portion of the orbits demonstrate no acute abnormality. SINUSES: There is a large mucous retention cyst within the left maxillary  sinus. Minimal mucosal thickening of the ethmoid sinuses bilaterally. The  mastoid air cells demonstrate no acute abnormality.     BONES/SOFT TISSUES: The bone marrow signal intensity appears normal. The soft  tissues demonstrate no acute abnormality. Impression  1. No acute intracranial abnormality. No acute infarct. 2. Minimal cerebellar tonsillar ectopia. 3. Large mucous retention cyst in the left maxillary sinus. Assessment and Plan     1. Facial numbness  -His facial numbness does not follow any dermatomal or sensory nerve distributions. Unsure exact etiology of this numbness. This is minimally bothersome and does not cause him pain. MRI nonacute with minimal cerebellar tonsillar ectopic which is unlikely the source of this facial numbness. He is getting a sleep study tonight and treatment for underlying likely sleep apnea would likely improve his overall health. Continue follow-up with neurology    2. Mucous retention cyst of left maxillary sinus  -Essentially asymptomatic. Unlikely related to his facial numbness pattern. 3. Allergic rhinitis, unspecified seasonality, unspecified trigger  -Continue Flonase and Astelin installation    4. At risk for obstructive sleep apnea  -Has sleep study scheduled for tonight. Follow-up with sleep medicine afterwards. Follow-Up     Return if symptoms worsen or fail to improve. Dr. Alessandro Thomas [place] [id number]  Department of Otolaryngology/Head and Neck Surgery  2/2/23    Medical Decision Making: The following items were considered in medical decision making:  Independent review of images  Review / order clinical lab tests  Review / order radiology tests  Decision to obtain old records      This note was generated completely or in part utilizing Dragon dictation speech recognition software. Occasionally, words are mistranscribed and despite editing, the text may contain inaccuracies due to incorrect word recognition. If further clarification is needed please contact the office at [phone].

## 2023-02-06 ENCOUNTER — PATIENT MESSAGE (OUTPATIENT)
Dept: FAMILY MEDICINE CLINIC | Age: 53
End: 2023-02-06

## 2023-02-06 NOTE — TELEPHONE ENCOUNTER
From: Natacha Cardona  To: Dr. Rommel Guerrero: 2/6/2023 8:44 AM EST  Subject: Unanswered health concerns     I brought it to the attention of my ENT. That I have been experiencing numbness in my face, from my brow line center of my nose. Straight down my nose, covering my upper gum area around my two front teeth, the roof of my mouth and the first inch or so of my tongue. He dismissed it as nothing. I am bringing this to your attention because I am also experiencing numbness in both my lower legs and the lower parts of my arms. I am still taking the vitamin b complex.     Thanks Arie Rosales

## 2023-02-07 ENCOUNTER — PATIENT MESSAGE (OUTPATIENT)
Dept: FAMILY MEDICINE CLINIC | Age: 53
End: 2023-02-07

## 2023-02-07 ENCOUNTER — TELEPHONE (OUTPATIENT)
Dept: PULMONOLOGY | Age: 53
End: 2023-02-07

## 2023-02-07 NOTE — TELEPHONE ENCOUNTER
Pt calling back to apologize and asking to continue with treatment. Stevie Neville was notified to call pr to schedule a titration study.

## 2023-02-07 NOTE — TELEPHONE ENCOUNTER
From: Charolotte Frankel  To: Dr. Jaja Chávez: 2/7/2023 7:31 AM EST  Subject: Follow up     Just out of curiosity what, if anything can be done about the pinched nerve you mentioned that might be responsible for the numbness in my face?  I think I would like to pursue that remedy first.     Thank you   Maine Sood

## 2023-02-07 NOTE — TELEPHONE ENCOUNTER
Spoke with pt to review results of PSG. Pt states that he\" disputes the results of the study and is done with this\". Pt also stated he will not wear something on his face.

## 2023-02-07 NOTE — TELEPHONE ENCOUNTER
Possible causes of unilateral facial numbness.   Trigeminal neuralgia  Bell's palsy  Multiple sclerosis   vestibular schwannoma  Norton Barré syndrome

## 2023-02-08 ENCOUNTER — PATIENT MESSAGE (OUTPATIENT)
Dept: FAMILY MEDICINE CLINIC | Age: 53
End: 2023-02-08

## 2023-02-08 NOTE — TELEPHONE ENCOUNTER
From: Ana Reno  To: Dr. Jean Montenegro: 2/8/2023 11:19 AM EST  Subject: Mental health, Medication     Dr. Aaron Mcneil, I have been in therapy for a couple months now, My therapist recommends I be put on something called an Ssri , for depression. He is not able to prescribe anything, I was wondering if that would be something that you could help me with? The therapist name is Toby Gonsalez, his office number is [phone] direct number is [phone]  Fax [phone]. He works for [employer] [address] [place], [place] [address] [facility]

## 2023-02-09 DIAGNOSIS — G47.33 OBSTRUCTIVE SLEEP APNEA (ADULT) (PEDIATRIC): Primary | ICD-10-CM

## 2023-02-19 SDOH — ECONOMIC STABILITY: HOUSING INSECURITY
IN THE LAST 12 MONTHS, WAS THERE A TIME WHEN YOU DID NOT HAVE A STEADY PLACE TO SLEEP OR SLEPT IN A SHELTER (INCLUDING NOW)?: NO

## 2023-02-19 SDOH — ECONOMIC STABILITY: INCOME INSECURITY: HOW HARD IS IT FOR YOU TO PAY FOR THE VERY BASICS LIKE FOOD, HOUSING, MEDICAL CARE, AND HEATING?: PATIENT DECLINED

## 2023-02-19 SDOH — ECONOMIC STABILITY: FOOD INSECURITY: WITHIN THE PAST 12 MONTHS, YOU WORRIED THAT YOUR FOOD WOULD RUN OUT BEFORE YOU GOT MONEY TO BUY MORE.: NEVER TRUE

## 2023-02-19 SDOH — ECONOMIC STABILITY: FOOD INSECURITY: WITHIN THE PAST 12 MONTHS, THE FOOD YOU BOUGHT JUST DIDN'T LAST AND YOU DIDN'T HAVE MONEY TO GET MORE.: NEVER TRUE

## 2023-02-22 ENCOUNTER — OFFICE VISIT (OUTPATIENT)
Dept: FAMILY MEDICINE CLINIC | Age: 53
End: 2023-02-22
Payer: MEDICAID

## 2023-02-22 VITALS
HEIGHT: 72 IN | OXYGEN SATURATION: 97 % | BODY MASS INDEX: 29.8 KG/M2 | RESPIRATION RATE: 18 BRPM | WEIGHT: 220 LBS | HEART RATE: 64 BPM | DIASTOLIC BLOOD PRESSURE: 88 MMHG | SYSTOLIC BLOOD PRESSURE: 130 MMHG

## 2023-02-22 DIAGNOSIS — F32.1 CURRENT MODERATE EPISODE OF MAJOR DEPRESSIVE DISORDER WITHOUT PRIOR EPISODE (HCC): Primary | ICD-10-CM

## 2023-02-22 PROCEDURE — 3078F DIAST BP <80 MM HG: CPT | Performed by: FAMILY MEDICINE

## 2023-02-22 PROCEDURE — 99214 OFFICE O/P EST MOD 30 MIN: CPT | Performed by: FAMILY MEDICINE

## 2023-02-22 PROCEDURE — 3074F SYST BP LT 130 MM HG: CPT | Performed by: FAMILY MEDICINE

## 2023-02-22 RX ORDER — DULOXETIN HYDROCHLORIDE 30 MG/1
30 CAPSULE, DELAYED RELEASE ORAL
Qty: 30 CAPSULE | Refills: 5 | Status: SHIPPED | OUTPATIENT
Start: 2023-02-22

## 2023-02-22 ASSESSMENT — PATIENT HEALTH QUESTIONNAIRE - PHQ9: DEPRESSION UNABLE TO ASSESS: PT REFUSES

## 2023-02-22 NOTE — PROGRESS NOTES
Blanquita Thomas (:  1970) is a 47 y.o. male,Established patient, here for evaluation of the following chief complaint(s):  Depression (Wants to talk about starting medication for depression)       ASSESSMENT/PLAN:  558    Patient Instructions   Martinez Freire was seen today for depression. Diagnoses and all orders for this visit:    Current moderate episode of major depressive disorder without prior episode (HCC)  -     DULoxetine (CYMBALTA) 30 MG extended release capsule; Take 1 capsule by mouth daily (with breakfast)  Stay with counseling. GREG  Discussed how treatment of obstructive sleep apnea leads to decrease in anxiety, depression, blood pressure and headaches. Return in about 8 weeks (around 2023) for Hypertension, Anxiety, Depression, GREG. Subjective   SUBJECTIVE/OBJECTIVE:  Chief Complaint   Patient presents with    Depression     Wants to talk about starting medication for depression   525    Depression: Patient complains of depression. He complains of anhedonia, depressed mood, difficulty concentrating, feelings of worthlessness/guilt, hopelessness, impaired memory, insomnia, psychomotor agitation, retardation, recurrent thoughts of death, and weight loss. Onset was approximately 40 years ago, drinking and smoking pot as a teen. Had a break in relationships with both of his sons. He was angry [id number]. He is still angry. Anxiety started in . He saw a therapist in early 2023. His dog passed away last Saturday. He has been crying. He was seeing a therapist weekly and now every other week. The depression has been rapidly worsening since the death of his dog. He was previously walking with his dog every day. He has not been able to walk since that time. He denies current suicidal and homicidal plan or intent. Family history significant for depression. Possible organic causes contributing are: Obstructive sleep apnea.   Risk factors: positive family history in mother and negative life event see above. Previous anxiety treatment includes  clonazepam  and individual therapy currently for anxiety and depression. He has never been on medication for depression before. He knows he was in denial about his depression. He complains of the following side effects from the treatment:  tired on the clonazepam .   GREG  Had a sleep study 2/5/2023. Diagnosed with sleep apnea. Has a pending CPAP titration study. Review of Systems       Past Medical History:   Diagnosis Date    Anxiety     Asthma 01/01/2000    late 20's-early 30s. Cerebellar tonsillar ectopia ([facility] [address] 05/04/2021    Class 1 obesity due to excess calories without serious comorbidity with body mass index (BMI) of 31.0 to 31.9 in adult 09/14/2020    Closed fracture of head of fifth metacarpal     Boxer's fracture    Depression 2015    Former smoker 05/2019    started age 17, quit age 53    Fracture of finger of right hand     And knuckle    GI bleed 06/10/2019    History of blood transfusion     new this admission 6/10/19    Hypertension     Iron deficiency anemia due to chronic blood loss 10/31/2019    DUE TO GI BLEED/ULCERS    Lumbar disc disease 01/01/1994    L4-5 proteuding disc    Peptic ulcer 01/01/2015    EGD 3/2/2020: Distal circumferentialesophageal ulcer and distal Parra's. Gastric body and antrum gastritis    Seasonal allergic rhinitis     Sleep apnea 2023    Tinnitus of both ears 01/01/2017    Loss of hearing in left. Ulcer of esophagus without bleeding 05/05/2020       Past Surgical History:   Procedure Laterality Date    COLONOSCOPY  03/2015    1 benign polyp. Fernando/Clyde Ricketts. Due 1001-9535.     ENDOSCOPY, COLON, DIAGNOSTIC      LUMBAR SPINE SURGERY  01/01/2002    epidural steroids x3    NASAL ENDOSCOPY  03/11/2020    UPPER GASTROINTESTINAL ENDOSCOPY  3/2015    3 ulcers - peptic and esophageal    UPPER GASTROINTESTINAL ENDOSCOPY  06/2019    +ulcers    UPPER GASTROINTESTINAL ENDOSCOPY N/A 06/11/2019    EGD DIAGNOSTIC ONLY performed by Elisabeth Sifuentes MD at [facility]  08/19/2019    Dr. Ellyn Esquivel, ulcer healing in esophagitis, Parra's 1    UPPER GASTROINTESTINAL ENDOSCOPY N/A 01/06/2020    EGD CONTROL HEMORRHAGE performed by Caleb Lopes MD at [address] 01/06/2020    EGD SUBMUCOSAL/ INJECTION OF EPINEPHRINE performed by Caleb Lopes MD at [place] [address] N/A 03/02/2020    EGD BIOPSY performed by Kristy Molina MD at [address] [facility]  01/01/1983    and 1989       Social History     Socioeconomic History    Marital status: Single     Spouse name: Not on file    Number of children: 2    Years of education: 12    Highest education level: High school graduate   Occupational History    Occupation: FACTORY IN PAST     Comment: unemployed 11/2018   Tobacco Use    Smoking status: Former     Packs/day: 1.00     Years: 30.00     Pack years: 30.00     Types: Cigarettes     Start date: 1/1/1988     Quit date: 5/1/2019     Years since quitting: 3.8    Smokeless tobacco: Former     Types: Snuff    Tobacco comments:     started to smoke at 16 / smoked up to 1.5 ppd / smoked for 35 yrs on and off / 1 PPD (QUIT [id number])   Vaping Use    Vaping Use: Never used   Substance and Sexual Activity    Alcohol use: No     Comment: rare alcohol    Drug use: Not Currently     Types: Marijuana Imboden Declan)     Comment: was daily. None since 9/2018    Sexual activity: Yes     Partners: Female     Comment: I have recently become sexually active again   Other Topics Concern    Not on file   Social History Narrative    Exercise - too light headed. 3/26/19. None. 1/15/20. Walks every am about 1.5 mi 7 days/wk. 4/14/20 2.3 mi every day. 5/4/20. Walks 2 mi/day. 7/15/20. Was having anxiety attacks while walking his dog so stopped for 2 months. 10/6/20.  Walking 1.5 mi 7 day/wk. 8/24/21. 1.5-1.75 mi/day x7 day/wk. 12/7/21. Also Does 90 min 7 days core body. 3/8/22. Walks 2 mi/day 7 day/wk. 7/11/22.10/17/22.1/16/23. Social Determinants of Health     Financial Resource Strain: Unknown    Difficulty of Paying Living Expenses: Patient refused   Food Insecurity: No Food Insecurity    Worried About Running Out of Food in the Last Year: Never true    Ran Out of Food in the Last Year: Never true   Transportation Needs: Unknown    Lack of Transportation (Medical): Patient refused    Lack of Transportation (Non-Medical): No   Physical Activity: Not on file   Stress: Not on file   Social Connections: Not on file   Intimate Partner Violence: Not on file   Housing Stability: Unknown    Unable to Pay for Housing in the Last Year: Not on file    Number of Places Lived in the Last Year: Not on file    Unstable Housing in the Last Year: No       Family History   Problem Relation Age of Onset    Other Mother         gall bladder    Allergies Mother     Depression Mother         off meds x 10 years    Heart Attack Father 52        x3-4    Coronary Art Dis Father     Alcohol Abuse Father     Allergies Father     Other Brother         congenital calcium deposits n bones, COVID-19@ 48. Allergies Brother     Hearing Loss Brother         congenital    Allergy (Severe) Brother     No Known Problems Brother         in a wreck as teen    Cancer Maternal Grandmother     Diabetes type 2  Maternal Grandmother     Cancer Maternal Grandfather     Asthma Paternal Grandfather     No Known Problems Son     No Known Problems Son     Cancer Maternal Aunt         brain    Other Maternal Aunt         lung issues    Other Maternal Uncle         carotid stenosis    Heart Attack Paternal Aunt 46    Coronary Art Dis Paternal Aunt     Cancer Paternal Aunt         ? lung?     Lung Cancer Paternal Aunt     Lung Cancer Paternal Aunt     Lung Cancer Paternal Uncle        No Known Allergies    Current Outpatient Medications Medication Sig Dispense Refill    SYMBICORT 160-4.5 MCG/ACT AERO Inhale 2 puffs by mouth twice daily 33 g 1    albuterol sulfate HFA (PROVENTIL;VENTOLIN;PROAIR) 108 (90 Base) MCG/ACT inhaler INHALE 2 PUFFS BY MOUTH EVERY 4 HOURS AS NEEDED FOR WHEEZING OR SHORTNESS OF BREATH 54 g 1    clonazePAM (KLONOPIN) 0.5 MG tablet Take 1 tablet by mouth daily for 90 days. 30 tablet 2    pantoprazole (PROTONIX) 40 MG tablet Take 1 tablet by mouth twice daily 180 tablet 0    metoprolol tartrate (LOPRESSOR) 25 MG tablet Take 1 tablet by mouth twice daily 180 tablet 0    lisinopril (PRINIVIL;ZESTRIL) 20 MG tablet Take 1/2 (one-half) tablet by mouth once daily 45 tablet 0    topiramate (TOPAMAX) 25 MG tablet Take 1 tablet in the morning and 2 tablets at night 270 tablet 1    azelastine (ASTELIN) 0.1 % nasal spray 1 spray by Nasal route 2 times daily Use in each nostril as directed 30 mL 5    montelukast (SINGULAIR) 10 MG tablet Take 1 tablet by mouth nightly 30 tablet 11    fluticasone (FLONASE) 50 MCG/ACT nasal spray 2 sprays by Each Nostril route daily 48 g 1    Omega-3 Fatty Acids (OMEGA 3 PO) Take 1 capsule by mouth every morning (before breakfast) Indications: Decreased HDL Cholesterol      Zinc Acetate-Sweetleaf (FP ZINC LOZENGES PO) Take 0.5 lozenges by mouth 2 times daily       vitamin C (ASCORBIC ACID) 500 MG tablet Take 500 mg by mouth 2 times daily      Cholecalciferol (VITAMIN D3) 50 MCG (2000 UT) CAPS Take 1 capsule by mouth daily       Multiple Vitamins-Minerals (THERAPEUTIC MULTIVITAMIN-MINERALS) tablet Take 1 tablet by mouth daily       No current facility-administered medications for this visit.      Objective   Vitals:    02/22/23 1636   BP: 130/88   Site: Right Upper Arm   Position: Sitting   Cuff Size: Medium Adult   Pulse: 64   Resp: 18   SpO2: 97%   Weight: 220 lb (99.8 kg)   Height: 6' (1.829 m)     BP Readings from Last 3 Encounters:   02/22/23 130/88   02/02/23 (!) 156/94   01/17/23 (!) 150/80     Pulse Readings from Last 3 Encounters:   02/22/23 64   02/02/23 94   01/17/23 91     Wt Readings from Last 3 Encounters:   02/22/23 220 lb (99.8 kg)   02/02/23 220 lb (99.8 kg)   01/17/23 225 lb (102.1 kg)     Body mass index is 29.84 kg/m². Physical Exam  Vitals and nursing note reviewed. Constitutional:       General: He is not in acute distress. Appearance: Normal appearance. He is well-developed, well-groomed and overweight. Eyes:      General: No scleral icterus. Right eye: No discharge. Left eye: No discharge. Neck:      Trachea: Phonation normal.   Pulmonary:      Effort: Pulmonary effort is normal.   Musculoskeletal:      Cervical back: Neck supple. Skin:     General: Skin is dry. Neurological:      Mental Status: He is alert. Cranial Nerves: No dysarthria or facial asymmetry. Gait: Gait normal.   Psychiatric:         Attention and Perception: Attention and perception normal.         Mood and Affect: Affect normal. Mood is depressed. Speech: Speech normal.         Behavior: Behavior normal. Behavior is cooperative. Cognition and Memory: Cognition and memory normal.         Judgment: Judgment normal.      Comments: He has good insight into his past and current life. On this date 2/22/2023 I have spent 33 minutes reviewing previous notes, test results and face to face with the patient discussing the diagnosis and importance of compliance with the treatment plan as well as documenting on the day of the visit. An electronic signature was used to authenticate this note.     --Marcelle Life, DO

## 2023-02-22 NOTE — PATIENT INSTRUCTIONS
Isabelle Beaver was seen today for depression. Diagnoses and all orders for this visit:    Current moderate episode of major depressive disorder without prior episode (HCC)  -     DULoxetine (CYMBALTA) 30 MG extended release capsule; Take 1 capsule by mouth daily (with breakfast)  Stay with counseling.

## 2023-02-25 DIAGNOSIS — I10 ESSENTIAL HYPERTENSION: ICD-10-CM

## 2023-02-27 ENCOUNTER — OFFICE VISIT (OUTPATIENT)
Dept: NEUROLOGY | Age: 53
End: 2023-02-27
Payer: MEDICAID

## 2023-02-27 VITALS
BODY MASS INDEX: 29.8 KG/M2 | HEART RATE: 109 BPM | DIASTOLIC BLOOD PRESSURE: 93 MMHG | HEIGHT: 72 IN | SYSTOLIC BLOOD PRESSURE: 153 MMHG | WEIGHT: 220 LBS

## 2023-02-27 DIAGNOSIS — Q04.8 CEREBELLAR TONSILLAR ECTOPIA (HCC): Primary | ICD-10-CM

## 2023-02-27 DIAGNOSIS — I10 ESSENTIAL HYPERTENSION: ICD-10-CM

## 2023-02-27 PROCEDURE — 3080F DIAST BP >= 90 MM HG: CPT | Performed by: PSYCHIATRY & NEUROLOGY

## 2023-02-27 PROCEDURE — 99213 OFFICE O/P EST LOW 20 MIN: CPT | Performed by: PSYCHIATRY & NEUROLOGY

## 2023-02-27 PROCEDURE — 3077F SYST BP >= 140 MM HG: CPT | Performed by: PSYCHIATRY & NEUROLOGY

## 2023-02-27 RX ORDER — LISINOPRIL 20 MG/1
TABLET ORAL
Qty: 45 TABLET | Refills: 0 | OUTPATIENT
Start: 2023-02-27

## 2023-02-27 RX ORDER — LISINOPRIL 20 MG/1
TABLET ORAL
Qty: 45 TABLET | Refills: 0 | Status: SHIPPED | OUTPATIENT
Start: 2023-02-27

## 2023-03-03 DIAGNOSIS — K22.10 ULCER OF ESOPHAGUS WITHOUT BLEEDING: ICD-10-CM

## 2023-03-03 RX ORDER — PANTOPRAZOLE SODIUM 40 MG/1
TABLET, DELAYED RELEASE ORAL
Qty: 180 TABLET | Refills: 0 | Status: SHIPPED | OUTPATIENT
Start: 2023-03-03

## 2023-03-04 ENCOUNTER — E-VISIT (OUTPATIENT)
Dept: FAMILY MEDICINE CLINIC | Age: 53
End: 2023-03-04
Payer: MEDICAID

## 2023-03-04 DIAGNOSIS — F32.1 CURRENT MODERATE EPISODE OF MAJOR DEPRESSIVE DISORDER WITHOUT PRIOR EPISODE (HCC): ICD-10-CM

## 2023-03-04 DIAGNOSIS — I10 ESSENTIAL HYPERTENSION: Primary | ICD-10-CM

## 2023-03-04 PROBLEM — J45.901 MODERATE ASTHMA WITH ACUTE EXACERBATION: Chronic | Status: ACTIVE | Noted: 2020-05-05

## 2023-03-04 PROCEDURE — 99423 OL DIG E/M SVC 21+ MIN: CPT | Performed by: FAMILY MEDICINE

## 2023-03-04 NOTE — PATIENT INSTRUCTIONS
Diagnoses and all orders for this visit:    Essential hypertension  Here are your blood pressures taken at physicians offices or in the emergency room over the last year and a half approximately:       Vitals with Age-Percentiles 2/27/2023 2/27/2023 2/22/2023 6/5/8485   Systolic 606 778 365 204   Diastolic 93 879 88 94      Vitals with Age-Percentiles 1/17/2023 1/17/2023 11/14/2022 44/47/3739   Systolic 608 294 115 887   Diastolic 80 80 90 81      Vitals with Age-Percentiles 10/17/2022 9/21/2022 8/29/2022 6/76/8571   Systolic 804 050 120 501   Diastolic 74 83 79 95      Vitals with Age-Percentiles 7/11/2022 7/7/2022 6/13/2022 7/7/2071   Systolic 494 173 886 869   Diastolic 82 85 81 80      Vitals with Age-Percentiles 12/7/2021 7/86/5652   Systolic 179 114   Diastolic 82 80     Your ideal goal blood pressure is 119-100/79 or less. However with blood pressures of 139/89 or less we will not usually increase your medication unless you are having symptoms. In tracking down where these high blood pressures above occur they are everywhere but in our office. This suggests that your blood pressures may be going up when you are even mildly stressed by seeing a specialist.  Oftentimes specialist visits are at a rapid pace and require you to get your information to them quickly. Sometimes this stresses people out slightly. However increases blood pressure into the 160s indicates poor overall control. If your blood pressures are in the 150s and 160s when you are stressed but are 110 or less in our office it makes it hard to increase your blood pressure medicine without causing hypotension. Recently however your blood pressures are usually high. Have you already had your CPAP titration sleep study? Have you started using a CPAP yet? If you have not yet started you may need to take a whole lisinopril rather than a half in order to keep your blood pressure down.   Then in the setting of our office where your blood pressures are lower trending upward. Treatment of sleep apnea will help with anxiety, depression, mood changes, blood pressure, lightheadedness, forgetfulness, as well as lower your risk of stroke, heart attack and congestive heart failure. Duloxetine can also result in slight increases in blood pressures because it helps to energize you hitting the norepinephrine (noradrenaline) receptors. Would recommend that you increase your lisinopril to a full tablet daily until such time as your blood pressures drop after you have initiated CPAP treatment. It may take several weeks after being on CPAP to see your blood pressures reach goal.  You will be able to drop back down to 1/2 tablet when your usual systolic (upper number) blood pressures are below 110. Current moderate episode of major depressive disorder without prior episode [facility])  Are you experiencing any side effects with your duloxetine? Are you sleeping okay? Have you noticed the increase in energy? Are you feeling more rested when you get up or less? Have you been having constipation? As we discussed it will take weeks to see noticeable improvement in your mood on this medication. Please send us a short message about how you are doing on the duloxetine. Thanks    Return around 4/17/2023 for Anxiety, Depression, GREG and to determine the effects of these treatments on your blood pressure.   Thanks

## 2023-03-06 ENCOUNTER — TELEPHONE (OUTPATIENT)
Dept: FAMILY MEDICINE CLINIC | Age: 53
End: 2023-03-06

## 2023-03-06 ENCOUNTER — PATIENT MESSAGE (OUTPATIENT)
Dept: FAMILY MEDICINE CLINIC | Age: 53
End: 2023-03-06

## 2023-03-06 DIAGNOSIS — J01.90 ACUTE BACTERIAL SINUSITIS: Primary | ICD-10-CM

## 2023-03-06 DIAGNOSIS — B96.89 ACUTE BACTERIAL SINUSITIS: Primary | ICD-10-CM

## 2023-03-06 NOTE — TELEPHONE ENCOUNTER
From: Mike Pollard  To: Dr. Geanie Sandhoff: 3/6/2023 7:48 AM EST  Subject: Dizziness     I have exhausted my treatments with both dr. Lakia Barrios and dr. Gabriela Payton, both doctors have assured me that they cannot find anything seriously wrong, so with that, the dizziness I experience is not only consistent but it is getting worse. By consistent, I mean daily, all day. So, now to why I am reaching out to you. Is it possible that my blood pressure medicine could be causing the dizziness? I looked up the side effects of both and one of the side effects of both is dizziness. Metoprolol and Lisinopril are known to cause dizziness.      Thank you   Virginie Vaz

## 2023-03-06 NOTE — TELEPHONE ENCOUNTER
----- Message from Elif Rios sent at 3/6/2023  8:23 AM EST -----  Subject: Message to Provider    QUESTIONS  Information for Provider? Pt. Called and stated he would like to have   Aj Sofia as his new PCP after his April appt.  ---------------------------------------------------------------------------  --------------  Daryle Haver INFO  [phone]; OK to leave message on voicemail  ---------------------------------------------------------------------------  --------------  SCRIPT ANSWERS  Relationship to Patient?  Self

## 2023-03-09 RX ORDER — METHYLPREDNISOLONE 4 MG/1
TABLET ORAL
Qty: 1 KIT | Refills: 0 | Status: SHIPPED | OUTPATIENT
Start: 2023-03-09 | End: 2023-03-15

## 2023-03-09 RX ORDER — DOXYCYCLINE HYCLATE 100 MG
100 TABLET ORAL 2 TIMES DAILY
Qty: 14 TABLET | Refills: 0 | Status: SHIPPED | OUTPATIENT
Start: 2023-03-09 | End: 2023-03-16

## 2023-03-16 ENCOUNTER — HOSPITAL ENCOUNTER (OUTPATIENT)
Dept: SLEEP CENTER | Age: 53
Discharge: HOME | End: 2023-03-16
Payer: MEDICAID

## 2023-03-16 DIAGNOSIS — G47.33 OBSTRUCTIVE SLEEP APNEA (ADULT) (PEDIATRIC): ICD-10-CM

## 2023-03-16 PROCEDURE — 95811 POLYSOM 6/>YRS CPAP 4/> PARM: CPT

## 2023-03-28 ENCOUNTER — TELEPHONE (OUTPATIENT)
Dept: PULMONOLOGY | Age: 53
End: 2023-03-28

## 2023-03-28 NOTE — PROGRESS NOTES
Promise Montanez         : 1970  [facility]    Diagnosis: [x] GREG (G47.33) [] CSA (G47.31) [] Apnea (G47.30)   Length of Need: [x] 18 Months [] 99 Months [] Other:    Machine (KENYATTA!): [] Respironics Dream Station   2   Auto [x] ResMed AirSense     Auto S11 [] Other:     [x]  CPAP () [] Bilevel ()   Mode: [x] Auto [] Spontaneous    Mode: [] Auto [] Spontaneous      P min 8 cmH2O  P max 18 cmH2O      Comfort Settings:   - Ramp Pressure: 4 cmH2O                                        - Ramp time: 15 min                                     -  Flex/EPR - 3 full time                                    - For ResMed Bilevel (TiMax-4 sec   TiMin- 0.2 sec)     Humidifier: [x] Heated ()        [x] Water chamber replacement ()/ 1 per 6 months        Mask:   [] Nasal () /1 per 3 months [x] Full Face () /1 per 3 months   [] Patient choice -Size and fit mask [x] Patient Choice - Size and fit mask   [] Dispense:  [] Dispense:    [] Headgear () / 1 per 3 months [x] Headgear () / 1 per 3 months   [] Replacement Nasal Cushion ()/2 per month [x] Interface Replacement ()/1 per month   [] Replacement Nasal Pillows ()/2 per month         Tubing: [x] Heated ()/1 per 3 months    [] Standard ()/1 per 3 months [] Other:           Filters: [x] Non-disposable ()/1 per 6 months     [x] Ultra-Fine, Disposable ()/2 per month        Miscellaneous: [] Chin Strap ()/ 1 per 6 months [] O2 bleed-in:       LPM   [] Oximetry on CPAP/Bilevel []  Other:    [x] Modem: ()         Start Order Date: 23    MEDICAL JUSTIFICATION:  I, the undersigned, certify that the above prescribed supplies are medically necessary for this patients wellbeing. In my opinion, the supplies are both reasonable and necessary in reference to accepted standards of medicalpractice in treatment of this patients condition.     ÓSCAR Tucker      NPI: [id number]       Order Signed Date:

## 2023-04-17 ENCOUNTER — OFFICE VISIT (OUTPATIENT)
Dept: FAMILY MEDICINE CLINIC | Age: 53
End: 2023-04-17
Payer: MEDICAID

## 2023-04-17 VITALS
HEIGHT: 72 IN | SYSTOLIC BLOOD PRESSURE: 150 MMHG | WEIGHT: 227.8 LBS | DIASTOLIC BLOOD PRESSURE: 90 MMHG | BODY MASS INDEX: 30.85 KG/M2

## 2023-04-17 DIAGNOSIS — J30.9 ALLERGIC SINUSITIS: ICD-10-CM

## 2023-04-17 DIAGNOSIS — F32.1 CURRENT MODERATE EPISODE OF MAJOR DEPRESSIVE DISORDER WITHOUT PRIOR EPISODE (HCC): ICD-10-CM

## 2023-04-17 DIAGNOSIS — E78.00 HYPERCHOLESTEROLEMIA: ICD-10-CM

## 2023-04-17 DIAGNOSIS — I10 ESSENTIAL HYPERTENSION: Primary | ICD-10-CM

## 2023-04-17 DIAGNOSIS — J30.1 NON-SEASONAL ALLERGIC RHINITIS DUE TO POLLEN: ICD-10-CM

## 2023-04-17 DIAGNOSIS — D72.9 NEUTROPHILIC LEUKOCYTOSIS: ICD-10-CM

## 2023-04-17 DIAGNOSIS — R42 EPISODIC LIGHTHEADEDNESS: ICD-10-CM

## 2023-04-17 DIAGNOSIS — E78.6 LOW HDL (UNDER 40): ICD-10-CM

## 2023-04-17 DIAGNOSIS — J32.2 CHRONIC ETHMOIDAL SINUSITIS: ICD-10-CM

## 2023-04-17 DIAGNOSIS — K22.10 ULCER OF ESOPHAGUS WITHOUT BLEEDING: ICD-10-CM

## 2023-04-17 DIAGNOSIS — D50.0 IRON DEFICIENCY ANEMIA DUE TO CHRONIC BLOOD LOSS: ICD-10-CM

## 2023-04-17 LAB
ALBUMIN SERPL-MCNC: 4.9 G/DL (ref 3.4–5)
ALBUMIN/GLOB SERPL: 2 {RATIO} (ref 1.1–2.2)
ALP SERPL-CCNC: 76 U/L (ref 40–129)
ALT SERPL-CCNC: 26 U/L (ref 10–40)
ANION GAP SERPL CALCULATED.3IONS-SCNC: 12 MMOL/L (ref 3–16)
AST SERPL-CCNC: 17 U/L (ref 15–37)
BASOPHILS # BLD: 0 K/UL (ref 0–0.2)
BASOPHILS NFR BLD: 0.2 %
BILIRUB SERPL-MCNC: 0.4 MG/DL (ref 0–1)
BUN SERPL-MCNC: 24 MG/DL (ref 7–20)
CALCIUM SERPL-MCNC: 10.1 MG/DL (ref 8.3–10.6)
CHLORIDE SERPL-SCNC: 100 MMOL/L (ref 99–110)
CHOLEST SERPL-MCNC: 200 MG/DL (ref 0–199)
CO2 SERPL-SCNC: 26 MMOL/L (ref 21–32)
CREAT SERPL-MCNC: 1 MG/DL (ref 0.9–1.3)
CRP SERPL-MCNC: 3.6 MG/L (ref 0–5.1)
DEPRECATED RDW RBC AUTO: 13.2 % (ref 12.4–15.4)
EOSINOPHIL # BLD: 0.2 K/UL (ref 0–0.6)
EOSINOPHIL NFR BLD: 1 %
ERYTHROCYTE [SEDIMENTATION RATE] IN BLOOD BY WESTERGREN METHOD: 12 MM/HR (ref 0–20)
GFR SERPLBLD CREATININE-BSD FMLA CKD-EPI: >60 ML/MIN/{1.73_M2}
GLUCOSE SERPL-MCNC: 94 MG/DL (ref 70–99)
HCT VFR BLD AUTO: 45.4 % (ref 40.5–52.5)
HDLC SERPL-MCNC: 49 MG/DL (ref 40–60)
HGB BLD-MCNC: 15.3 G/DL (ref 13.5–17.5)
LDLC SERPL CALC-MCNC: 131 MG/DL
LYMPHOCYTES # BLD: 1.7 K/UL (ref 1–5.1)
LYMPHOCYTES NFR BLD: 9.1 %
MCH RBC QN AUTO: 29.5 PG (ref 26–34)
MCHC RBC AUTO-ENTMCNC: 33.7 G/DL (ref 31–36)
MCV RBC AUTO: 87.6 FL (ref 80–100)
MONOCYTES # BLD: 0.9 K/UL (ref 0–1.3)
MONOCYTES NFR BLD: 4.8 %
NEUTROPHILS # BLD: 16 K/UL (ref 1.7–7.7)
NEUTROPHILS NFR BLD: 84.9 %
PLATELET # BLD AUTO: 324 K/UL (ref 135–450)
PMV BLD AUTO: 8.7 FL (ref 5–10.5)
POTASSIUM SERPL-SCNC: 5.3 MMOL/L (ref 3.5–5.1)
PROT SERPL-MCNC: 7.3 G/DL (ref 6.4–8.2)
RBC # BLD AUTO: 5.19 M/UL (ref 4.2–5.9)
SODIUM SERPL-SCNC: 138 MMOL/L (ref 136–145)
TRIGL SERPL-MCNC: 102 MG/DL (ref 0–150)
VLDLC SERPL CALC-MCNC: 20 MG/DL
WBC # BLD AUTO: 18.8 K/UL (ref 4–11)

## 2023-04-17 PROCEDURE — 3074F SYST BP LT 130 MM HG: CPT | Performed by: FAMILY MEDICINE

## 2023-04-17 PROCEDURE — 99215 OFFICE O/P EST HI 40 MIN: CPT | Performed by: FAMILY MEDICINE

## 2023-04-17 PROCEDURE — 36415 COLL VENOUS BLD VENIPUNCTURE: CPT | Performed by: FAMILY MEDICINE

## 2023-04-17 PROCEDURE — 3078F DIAST BP <80 MM HG: CPT | Performed by: FAMILY MEDICINE

## 2023-04-17 RX ORDER — FLUTICASONE PROPIONATE 50 MCG
2 SPRAY, SUSPENSION (ML) NASAL DAILY
Qty: 48 G | Refills: 1 | Status: SHIPPED | OUTPATIENT
Start: 2023-04-17

## 2023-04-17 RX ORDER — AZELASTINE 1 MG/ML
1 SPRAY, METERED NASAL 2 TIMES DAILY
Qty: 90 ML | Refills: 1 | Status: SHIPPED | OUTPATIENT
Start: 2023-04-17

## 2023-04-17 RX ORDER — PANTOPRAZOLE SODIUM 40 MG/1
TABLET, DELAYED RELEASE ORAL
Qty: 180 TABLET | Refills: 0 | Status: SHIPPED | OUTPATIENT
Start: 2023-04-17

## 2023-04-17 ASSESSMENT — PATIENT HEALTH QUESTIONNAIRE - PHQ9
2. FEELING DOWN, DEPRESSED OR HOPELESS: 0
SUM OF ALL RESPONSES TO PHQ QUESTIONS 1-9: 0
SUM OF ALL RESPONSES TO PHQ QUESTIONS 1-9: 0
5. POOR APPETITE OR OVEREATING: 0
SUM OF ALL RESPONSES TO PHQ QUESTIONS 1-9: 0
6. FEELING BAD ABOUT YOURSELF - OR THAT YOU ARE A FAILURE OR HAVE LET YOURSELF OR YOUR FAMILY DOWN: 0
4. FEELING TIRED OR HAVING LITTLE ENERGY: 0
3. TROUBLE FALLING OR STAYING ASLEEP: 0
7. TROUBLE CONCENTRATING ON THINGS, SUCH AS READING THE NEWSPAPER OR WATCHING TELEVISION: 0
10. IF YOU CHECKED OFF ANY PROBLEMS, HOW DIFFICULT HAVE THESE PROBLEMS MADE IT FOR YOU TO DO YOUR WORK, TAKE CARE OF THINGS AT HOME, OR GET ALONG WITH OTHER PEOPLE: 0
1. LITTLE INTEREST OR PLEASURE IN DOING THINGS: 0
9. THOUGHTS THAT YOU WOULD BE BETTER OFF DEAD, OR OF HURTING YOURSELF: 0
8. MOVING OR SPEAKING SO SLOWLY THAT OTHER PEOPLE COULD HAVE NOTICED. OR THE OPPOSITE, BEING SO FIGETY OR RESTLESS THAT YOU HAVE BEEN MOVING AROUND A LOT MORE THAN USUAL: 0
SUM OF ALL RESPONSES TO PHQ9 QUESTIONS 1 & 2: 0
SUM OF ALL RESPONSES TO PHQ QUESTIONS 1-9: 0

## 2023-04-17 NOTE — PATIENT INSTRUCTIONS
You may receive a survey regarding the care you received during your visit. Your input is valuable to us. We encourage you to complete and return your survey. We hope you will choose us in the future for your healthcare needs. GENERAL OFFICE POLICIES      Telephone Calls: Messages will be answered within 1-2 business days, unless the provider is out of the office. If it is urgent a covering provider will answer. (this does not include Medication refills). MyChart: We recommend all patients sign up for [employer]. Through this portal you can see your lab results, request refills, schedule appointments, pay your bill and send messages to the office. [employer] messages will be answered within 1-2 business days unless the provider is out of the office. For urgent matters, please call the office. Appointments:  All appointments must be scheduled. We ask all patients to schedule their next follow up appointment before they leave the office to make sure you will be able to be seen before you run out of medications. 24 hours notice is required to cancel or reschedule an appointment to avoid being marked as a no show. You may be dismissed from the practice after 3 no shows. LATE for Appointment: If you are 15 or more minutes late for your appointment, you may be asked to reschedule. MA/LAB APPTS: Must be scheduled, cannot accept walk in lab visits. We only draw labs for patients established in our office. We only do injections for medications ordered by our office. Acute Sick Visits:  Nothing other than acute complaint will be addressed at this visit. TRADITIONAL MEDICARE  DOES NOT COVER PHYSICALS  MEDICARE WELLNESS VISITS: These are NOT physicals but the free annual visit offered by Medicare to discuss wellness issues. Medication refills, checkups, etc. will not be addressed during this visit.   Medication Refills: Refills are handled electronically so please contact your pharmacy for medication

## 2023-04-17 NOTE — PROGRESS NOTES
Fasting labs drawn RA/mv  1 sst  1 lav
- 5.90 M/uL 5.19   Hemoglobin Quant 13.5 - 17.5 g/dL 15.3   Hematocrit 40.5 - 52.5 % 45.4   MCV 80.0 - 100.0 fL 87.6   MCH 26.0 - 34.0 pg 29.5   MCHC 31.0 - 36.0 g/dL 33.7   MPV 5.0 - 10.5 fL 8.7   RDW 12.4 - 15.4 % 13.2   Platelet Count 556 - 450 K/uL 324   Neutrophils % % 84.9   Lymphocyte % % 9.1   Monocytes % % 4.8   Eosinophils % % 1.0   Basophils % % 0.2   Neutrophils Absolute 1.7 - 7.7 K/uL 16.0 (H)   Lymphocytes Absolute 1.0 - 5.1 K/uL 1.7   Monocytes Absolute 0.0 - 1.3 K/uL 0.9   Eosinophils Absolute 0.0 - 0.6 K/uL 0.2   Basophils Absolute 0.0 - 0.2 K/uL 0.0   Sed Rate 0 - 20 mm/Hr 12        On this date 4/17/2023 I have spent 44 minutes reviewing previous notes, test results and face to face with the patient discussing the diagnosis and importance of compliance with the treatment plan as well as documenting on the day of the visit. An electronic signature was used to authenticate this note.     --Yolanda Schneider DO

## 2023-04-24 ENCOUNTER — PATIENT MESSAGE (OUTPATIENT)
Dept: FAMILY MEDICINE CLINIC | Age: 53
End: 2023-04-24

## 2023-04-24 NOTE — TELEPHONE ENCOUNTER
Spoke with Bhupinder from [facility]. Per Bhupinder everything is ready to be set up and lmessage has been left for pt. Called pt and gave him the number for [facility] asking him to call them to schedule a pap set up.

## 2023-04-24 NOTE — TELEPHONE ENCOUNTER
From: Jose Castillo  To: Dr. Elie Robertson: 4/24/2023 9:20 AM EDT  Subject: CPAP    I just spoke to my insurance about the order for my CPAP, and I was told that my primary care physician would have to submit a prescription for my CPAP. I just saw your message about a possible blood issue, and you have sent a recommendation to a hematologist, should I contact them or will they contact me?

## 2023-05-11 DIAGNOSIS — F41.9 ANXIETY: ICD-10-CM

## 2023-05-11 DIAGNOSIS — I10 ESSENTIAL HYPERTENSION: ICD-10-CM

## 2023-05-11 RX ORDER — LISINOPRIL 20 MG/1
TABLET ORAL
Qty: 45 TABLET | Refills: 0 | Status: SHIPPED | OUTPATIENT
Start: 2023-05-11 | End: 2023-05-11 | Stop reason: SDUPTHER

## 2023-05-12 RX ORDER — CLONAZEPAM 0.5 MG/1
0.5 TABLET ORAL DAILY
Qty: 30 TABLET | Refills: 2 | Status: SHIPPED | OUTPATIENT
Start: 2023-05-12 | End: 2023-06-11

## 2023-05-23 DIAGNOSIS — F32.1 CURRENT MODERATE EPISODE OF MAJOR DEPRESSIVE DISORDER WITHOUT PRIOR EPISODE (HCC): ICD-10-CM

## 2023-05-23 DIAGNOSIS — J30.9 ALLERGIC SINUSITIS: ICD-10-CM

## 2023-05-23 DIAGNOSIS — J30.1 NON-SEASONAL ALLERGIC RHINITIS DUE TO POLLEN: ICD-10-CM

## 2023-05-23 RX ORDER — DULOXETIN HYDROCHLORIDE 30 MG/1
30 CAPSULE, DELAYED RELEASE ORAL
Qty: 30 CAPSULE | Refills: 5 | OUTPATIENT
Start: 2023-05-23

## 2023-05-23 RX ORDER — AZELASTINE 1 MG/ML
1 SPRAY, METERED NASAL 2 TIMES DAILY
Qty: 90 ML | Refills: 1 | Status: SHIPPED | OUTPATIENT
Start: 2023-05-23

## 2023-05-23 NOTE — TELEPHONE ENCOUNTER
LV 4/17/23 WITH DR Tatum Ruiz FOR HTN NV 6/26/23  DULoxetine (CYMBALTA) 30 MG extended release capsule 30 capsule 5 2/22/2023     Sig - Route:  Take 1 capsule by mouth daily (with breakfast) - Oral    Sent to pharmacy as: DULoxetine HCl 30 MG Oral Capsule Delayed Release Particles (CYMBALTA)    E-Prescribing Status: Receipt confirmed by pharmacy (2/22/2023  6:04 PM EST)    DUPLICATE REQUEST

## 2023-05-30 ENCOUNTER — PATIENT MESSAGE (OUTPATIENT)
Dept: PULMONOLOGY | Age: 53
End: 2023-05-30

## 2023-05-30 NOTE — TELEPHONE ENCOUNTER
From: Thien Click  To: Dr. Galo Gomez: 5/30/2023 7:55 AM EDT  Subject: Upcoming appointment     I am canceling my appointment. Thank you.     Martin Siu

## 2023-06-04 DIAGNOSIS — K22.10 ULCER OF ESOPHAGUS WITHOUT BLEEDING: ICD-10-CM

## 2023-06-04 DIAGNOSIS — I10 ESSENTIAL HYPERTENSION: ICD-10-CM

## 2023-06-05 DIAGNOSIS — K22.10 ULCER OF ESOPHAGUS WITHOUT BLEEDING: ICD-10-CM

## 2023-06-05 DIAGNOSIS — I10 ESSENTIAL HYPERTENSION: ICD-10-CM

## 2023-06-05 RX ORDER — PANTOPRAZOLE SODIUM 40 MG/1
TABLET, DELAYED RELEASE ORAL
Qty: 180 TABLET | Refills: 0 | Status: SHIPPED | OUTPATIENT
Start: 2023-06-05

## 2023-06-05 RX ORDER — PANTOPRAZOLE SODIUM 40 MG/1
TABLET, DELAYED RELEASE ORAL
Qty: 180 TABLET | Refills: 0 | OUTPATIENT
Start: 2023-06-05

## 2023-06-05 NOTE — TELEPHONE ENCOUNTER
LV 4/17/23 WITH DR Jailyn Evans FOR HTN NV 7/17/23  pantoprazole (PROTONIX) 40 MG tablet 180 tablet 0 6/5/2023     Sig: Take 1 tablet by mouth twice daily    Sent to pharmacy as: Pantoprazole Sodium 40 MG Oral Tablet Delayed Release (PROTONIX)    E-Prescribing Status: Receipt confirmed by pharmacy (6/5/2023  9:30 AM EDT)    DUPLICATE REQUEST

## 2023-06-20 DIAGNOSIS — F32.1 CURRENT MODERATE EPISODE OF MAJOR DEPRESSIVE DISORDER WITHOUT PRIOR EPISODE (HCC): ICD-10-CM

## 2023-06-20 RX ORDER — DULOXETIN HYDROCHLORIDE 30 MG/1
30 CAPSULE, DELAYED RELEASE ORAL
Qty: 30 CAPSULE | Refills: 5 | OUTPATIENT
Start: 2023-06-20

## 2023-06-20 NOTE — TELEPHONE ENCOUNTER
LV 4/17/23 WITH DR Vijay Aaron FOR HTN NV 7/17/23   Disp Refills Start End    DULoxetine (CYMBALTA) 30 MG extended release capsule 30 capsule 5 2/22/2023     Sig - Route:  Take 1 capsule by mouth daily (with breakfast) - Oral    Sent to pharmacy as: DULoxetine HCl 30 MG Oral Capsule Delayed Release Particles (CYMBALTA)    E-Prescribing Status: Receipt confirmed by pharmacy (2/22/2023  6:97 PM EST)    DUPLICATE REQUEST

## 2023-07-17 ENCOUNTER — OFFICE VISIT (OUTPATIENT)
Dept: FAMILY MEDICINE CLINIC | Age: 53
End: 2023-07-17
Payer: MEDICAID

## 2023-07-17 VITALS
OXYGEN SATURATION: 98 % | HEIGHT: 72 IN | SYSTOLIC BLOOD PRESSURE: 158 MMHG | WEIGHT: 234 LBS | DIASTOLIC BLOOD PRESSURE: 98 MMHG | BODY MASS INDEX: 31.69 KG/M2 | HEART RATE: 76 BPM

## 2023-07-17 DIAGNOSIS — Z23 NEED FOR TDAP VACCINATION: ICD-10-CM

## 2023-07-17 DIAGNOSIS — F41.9 ANXIETY: ICD-10-CM

## 2023-07-17 DIAGNOSIS — R07.89 ATYPICAL CHEST PAIN: ICD-10-CM

## 2023-07-17 DIAGNOSIS — E66.9 CLASS 1 OBESITY WITH SERIOUS COMORBIDITY AND BODY MASS INDEX (BMI) OF 31.0 TO 31.9 IN ADULT, UNSPECIFIED OBESITY TYPE: ICD-10-CM

## 2023-07-17 DIAGNOSIS — Z79.899 LONG TERM USE OF DRUG: ICD-10-CM

## 2023-07-17 DIAGNOSIS — Z12.11 COLON CANCER SCREENING: ICD-10-CM

## 2023-07-17 DIAGNOSIS — I10 UNCONTROLLED STAGE 2 HYPERTENSION: Primary | ICD-10-CM

## 2023-07-17 DIAGNOSIS — Z87.891 SMOKING HISTORY: ICD-10-CM

## 2023-07-17 PROCEDURE — 3077F SYST BP >= 140 MM HG: CPT | Performed by: NURSE PRACTITIONER

## 2023-07-17 PROCEDURE — 3080F DIAST BP >= 90 MM HG: CPT | Performed by: NURSE PRACTITIONER

## 2023-07-17 PROCEDURE — 99214 OFFICE O/P EST MOD 30 MIN: CPT | Performed by: NURSE PRACTITIONER

## 2023-07-17 RX ORDER — LISINOPRIL 20 MG/1
20 TABLET ORAL DAILY
Qty: 90 TABLET | Refills: 0 | Status: SHIPPED | OUTPATIENT
Start: 2023-07-17 | End: 2023-10-15

## 2023-07-17 ASSESSMENT — ENCOUNTER SYMPTOMS: RESPIRATORY NEGATIVE: 1

## 2023-07-17 NOTE — PROGRESS NOTES
Wale Lawrence (:  1970) is a 47 y.o. male,Established patient, here for evaluation of the following chief complaint(s):    Hypertension (Follow up )      SUBJECTIVE/OBJECTIVE:  HPI  He is taking his toprol 25 mg  lisinopril 10 mg   Hypertension: Patient here for follow-up of elevated blood pressure. He is not exercising and is adherent to low salt diet. Blood pressure is not well controlled at home. Cardiac symptoms none. Patient c/p chest pain. Left side  quick sharp pain that comes and goes  without activity Cardiovascular risk factors: hypertension, male gender, and obesity (BMI >= 30 kg/m2). Use of agents associated with hypertension: none. History of target organ damage: none. He would like to lose weight at least 40 pounds goal weight is 190 lbs he will start exercising again - he does not eat much - has stopped drinking pop snacking as much and rarely has fast food  Colonoscopy  Dr Sebastien Henriquez noted was supposed to have another one in 5-7 years he is waiting will get it in the next few years-the prep was too much    ANXIETY  HE MURPHY NOT TAKE HIS KLONOPIN AS OFTEN HE  IS TRYING TO TAKE IT AS NEEDED ONLY - ONE- TWO TIMES WEEKLY WHEN HE GETS A LITTLE ANXIOUS JUST OVERTHINKS THINGS MIND RACES THIS  HAS GOTTEN BETTER SINCE HE STARTED THE CYMBALTA-  HE PLANS TO GET OFF THE [employer]   He started smoking at age 16  was up to 1- 1.5 ppd-   He is using his symbicort and albuterol as prescribed denies SOB- cough    Review of Systems   Respiratory: Negative. Cardiovascular:  Positive for chest pain. Psychiatric/Behavioral:  The patient is nervous/anxious. Physical Exam  Vitals reviewed. Constitutional:       General: He is awake. He is not in acute distress. Cardiovascular:      Rate and Rhythm: Normal rate and regular rhythm.       Heart sounds: Normal heart sounds, S1 normal and S2 normal.   Pulmonary:      Effort: Pulmonary effort is normal.      Breath sounds: Normal breath

## 2023-07-21 LAB
6MAM UR QL: NOT DETECTED
7AMINOCLONAZEPAM UR QL: NOT DETECTED
A-OH ALPRAZ UR QL: NOT DETECTED
ALPHA-OH-MIDAZOLAM, URINE: NOT DETECTED
ALPRAZ UR QL: NOT DETECTED
AMPHET UR QL SCN: NOT DETECTED
ANNOTATION COMMENT IMP: NORMAL
ANNOTATION COMMENT IMP: NORMAL
BARBITURATES UR QL: NOT DETECTED
BUPRENORPHINE UR QL: NOT DETECTED
BZE UR QL: NOT DETECTED
CARBOXYTHC UR QL: NOT DETECTED
CARISOPRODOL UR QL: NOT DETECTED
CLONAZEPAM UR QL: NOT DETECTED
CODEINE UR QL: NOT DETECTED
CREAT UR-MCNC: 110 MG/DL (ref 20–400)
DIAZEPAM UR QL: NOT DETECTED
ETHYL GLUCURONIDE UR QL: NOT DETECTED
FENTANYL UR QL: NOT DETECTED
GABAPENTIN: NOT DETECTED
HYDROCODONE UR QL: NOT DETECTED
HYDROMORPHONE UR QL: NOT DETECTED
LORAZEPAM UR QL: NOT DETECTED
MDA UR QL: NOT DETECTED
MDEA UR QL: NOT DETECTED
MDMA UR QL: NOT DETECTED
MEPERIDINE UR QL: NOT DETECTED
METHADONE UR QL: NOT DETECTED
METHAMPHET UR QL: NOT DETECTED
MIDAZOLAM UR QL SCN: NOT DETECTED
MORPHINE UR QL: NOT DETECTED
NALOXONE: NOT DETECTED
NORBUPRENORPHINE UR QL CFM: NOT DETECTED
NORDIAZEPAM UR QL: NOT DETECTED
NORFENTANYL UR QL: NOT DETECTED
NORHYDROCODONE UR QL CFM: NOT DETECTED
NOROXYCODONE UR QL CFM: NOT DETECTED
NOROXYMORPHONE, URINE: NOT DETECTED
OXAZEPAM UR QL: NOT DETECTED
OXYCODONE UR QL: NOT DETECTED
OXYMORPHONE UR QL: NOT DETECTED
PATHOLOGY STUDY: NORMAL
PCP UR QL: NOT DETECTED
PHENTERMINE UR QL: NOT DETECTED
PPAA UR QL: NOT DETECTED
PREGABALIN: NOT DETECTED
SERVICE CMNT-IMP: NORMAL
TAPENTADOL UR QL SCN: NOT DETECTED
TAPENTADOL-O-SULFATE, URINE: NOT DETECTED
TEMAZEPAM UR QL: NOT DETECTED
TRAMADOL UR QL: NOT DETECTED
ZOLPIDEM UR QL: NOT DETECTED

## 2023-07-26 DIAGNOSIS — F32.1 CURRENT MODERATE EPISODE OF MAJOR DEPRESSIVE DISORDER WITHOUT PRIOR EPISODE (HCC): ICD-10-CM

## 2023-07-26 RX ORDER — DULOXETIN HYDROCHLORIDE 30 MG/1
30 CAPSULE, DELAYED RELEASE ORAL
Qty: 30 CAPSULE | Refills: 0 | Status: SHIPPED | OUTPATIENT
Start: 2023-07-26 | End: 2023-10-24

## 2023-07-26 RX ORDER — DULOXETIN HYDROCHLORIDE 30 MG/1
30 CAPSULE, DELAYED RELEASE ORAL
Qty: 30 CAPSULE | Refills: 0 | Status: SHIPPED | OUTPATIENT
Start: 2023-07-26 | End: 2023-07-26 | Stop reason: SDUPTHER

## 2023-07-26 RX ORDER — LISINOPRIL 20 MG/1
20 TABLET ORAL DAILY
Qty: 90 TABLET | Refills: 0 | OUTPATIENT
Start: 2023-07-26 | End: 2023-10-24

## 2023-07-26 NOTE — TELEPHONE ENCOUNTER
LV 7/17/23 WITH CC FOR HTN NV 8/21/23   Disp Refills Start End    lisinopril (PRINIVIL;ZESTRIL) 20 MG tablet 90 tablet 0 7/17/2023 10/15/2023    Sig - Route:  Take 1 tablet by mouth daily - Oral    Sent to pharmacy as: Lisinopril 20 MG Oral Tablet (PRINIVIL;ZESTRIL)    E-Prescribing Status: Sent to pharmacy (7/17/2023  9:28 PM EDT)    REFILL TOO SOON

## 2023-08-21 ENCOUNTER — OFFICE VISIT (OUTPATIENT)
Dept: FAMILY MEDICINE CLINIC | Age: 53
End: 2023-08-21
Payer: COMMERCIAL

## 2023-08-21 VITALS
OXYGEN SATURATION: 99 % | HEIGHT: 72 IN | DIASTOLIC BLOOD PRESSURE: 96 MMHG | BODY MASS INDEX: 32.13 KG/M2 | SYSTOLIC BLOOD PRESSURE: 156 MMHG | WEIGHT: 237.2 LBS | HEART RATE: 88 BPM

## 2023-08-21 DIAGNOSIS — I10 UNCONTROLLED STAGE 2 HYPERTENSION: Primary | ICD-10-CM

## 2023-08-21 DIAGNOSIS — E66.9 CLASS 1 OBESITY WITH SERIOUS COMORBIDITY AND BODY MASS INDEX (BMI) OF 31.0 TO 31.9 IN ADULT, UNSPECIFIED OBESITY TYPE: ICD-10-CM

## 2023-08-21 PROCEDURE — 3017F COLORECTAL CA SCREEN DOC REV: CPT | Performed by: NURSE PRACTITIONER

## 2023-08-21 PROCEDURE — 99213 OFFICE O/P EST LOW 20 MIN: CPT | Performed by: NURSE PRACTITIONER

## 2023-08-21 PROCEDURE — 1036F TOBACCO NON-USER: CPT | Performed by: NURSE PRACTITIONER

## 2023-08-21 PROCEDURE — 3080F DIAST BP >= 90 MM HG: CPT | Performed by: NURSE PRACTITIONER

## 2023-08-21 PROCEDURE — 3077F SYST BP >= 140 MM HG: CPT | Performed by: NURSE PRACTITIONER

## 2023-08-21 PROCEDURE — G8427 DOCREV CUR MEDS BY ELIG CLIN: HCPCS | Performed by: NURSE PRACTITIONER

## 2023-08-21 PROCEDURE — G8417 CALC BMI ABV UP PARAM F/U: HCPCS | Performed by: NURSE PRACTITIONER

## 2023-08-21 RX ORDER — METOPROLOL SUCCINATE 50 MG/1
50 TABLET, EXTENDED RELEASE ORAL DAILY
Qty: 60 TABLET | Refills: 0 | Status: SHIPPED | OUTPATIENT
Start: 2023-08-21 | End: 2023-10-20

## 2023-08-21 NOTE — PROGRESS NOTES
Evonne Shaw (:  1970) is a 47 y.o. male,Established patient, here for evaluation of the following chief complaint(s):    Weight Management      SUBJECTIVE/OBJECTIVE:  HPI   Philipp Garcia is here for b/p check   He is not checking his b/p at home  Denies chest pain- SOB- edema  The lisinopril was increased to 20 mg was taking 10 mg  The toprol is 25 mg BID  He has not had any issues with the medication  He would like to start Adipex as well  Review of Systems    Physical Exam  Vitals reviewed. Constitutional:       General: He is awake. He is not in acute distress. Appearance: Normal appearance. He is well-developed and well-groomed. He is obese. He is not ill-appearing, toxic-appearing or diaphoretic. Neurological:      Mental Status: He is alert and oriented to person, place, and time. Psychiatric:         Attention and Perception: Attention and perception normal.         Mood and Affect: Mood and affect normal.         Speech: Speech normal.         Behavior: Behavior normal. Behavior is cooperative. Thought Content: Thought content normal.         Cognition and Memory: Cognition and memory normal.         Judgment: Judgment normal.       Philipp Garcia was seen today for weight management. Diagnoses and all orders for this visit:    Uncontrolled stage 2 hypertension  Class 1 obesity with serious comorbidity and body mass index (BMI) of 31.0 to 31.9 in adult, unspecified obesity type  metoprolol succinate (TOPROL XL) 50 MG extended release tablet;  Take 1 tablet by mouth daily  Dose increased  Continue lisinopril 20 mg  Encouraged to monitor as well goal 140/90 or less  Lifestyle Recommendations for High Blood Pressure:  Reduce sodium intake to less than 1500 mg daily  Include 5-7 servings of fruits and vegetables daily  Reduce weight to ideal if overweight  30 minutes of physical activity daily   Follow up in one week for b/p check   If b/p at or below goal I will prescribe Adipex

## 2023-08-29 ENCOUNTER — NURSE ONLY (OUTPATIENT)
Dept: FAMILY MEDICINE CLINIC | Age: 53
End: 2023-08-29

## 2023-08-29 ENCOUNTER — TELEPHONE (OUTPATIENT)
Dept: CASE MANAGEMENT | Age: 53
End: 2023-08-29

## 2023-08-29 VITALS — SYSTOLIC BLOOD PRESSURE: 142 MMHG | DIASTOLIC BLOOD PRESSURE: 82 MMHG

## 2023-08-29 DIAGNOSIS — Z01.30 BLOOD PRESSURE CHECK: Primary | ICD-10-CM

## 2023-08-29 RX ORDER — METOPROLOL SUCCINATE 50 MG/1
50 TABLET, EXTENDED RELEASE ORAL 2 TIMES DAILY
Qty: 60 TABLET | Refills: 0 | Status: CANCELLED | OUTPATIENT
Start: 2023-08-29 | End: 2023-10-28

## 2023-08-29 NOTE — PROGRESS NOTES
Pt here today for bp check, bp LA sitting was 162/84. Pt states that he always gets flustered here (white coat). Had pt sit for a few minutes and repeated bp LA sitting and it was 142/82. Please advise./mv      Also pt will need new rx for Metoprolol 50 mg. Rx was written for 1 tablet qd and should be 1 tablet bid.   Please send to [facility]./mv

## 2023-08-30 ENCOUNTER — PATIENT MESSAGE (OUTPATIENT)
Dept: FAMILY MEDICINE CLINIC | Age: 53
End: 2023-08-30

## 2023-08-30 DIAGNOSIS — E66.9 CLASS 1 OBESITY WITH SERIOUS COMORBIDITY AND BODY MASS INDEX (BMI) OF 32.0 TO 32.9 IN ADULT, UNSPECIFIED OBESITY TYPE: Primary | ICD-10-CM

## 2023-08-30 RX ORDER — PHENTERMINE HYDROCHLORIDE 37.5 MG/1
37.5 TABLET ORAL
Qty: 30 TABLET | Refills: 0 | Status: SHIPPED | OUTPATIENT
Start: 2023-08-30 | End: 2023-09-29

## 2023-08-30 RX ORDER — METOPROLOL SUCCINATE 100 MG/1
100 TABLET, EXTENDED RELEASE ORAL DAILY
Qty: 90 TABLET | Refills: 0 | Status: SHIPPED | OUTPATIENT
Start: 2023-08-30 | End: 2023-11-28

## 2023-08-30 NOTE — TELEPHONE ENCOUNTER
From: Katerine Lind  To: Juancarlos Green  Sent: 8/30/2023 9:50 AM EDT  Subject: Weight loss medication     Have you submitted the prescription for my weight loss medication?     Thank you   Lorraine Payton

## 2023-08-31 ENCOUNTER — TELEPHONE (OUTPATIENT)
Dept: FAMILY MEDICINE CLINIC | Age: 53
End: 2023-08-31

## 2023-08-31 NOTE — TELEPHONE ENCOUNTER
Px informed Adipex is not in stock at Pharmacy. Patient would like to wait it out until they have some in stock.   AM

## 2023-09-01 ENCOUNTER — TELEPHONE (OUTPATIENT)
Dept: ADMINISTRATIVE | Age: 53
End: 2023-09-01

## 2023-09-01 NOTE — TELEPHONE ENCOUNTER
Submitted PA for Phentermine  Via [facility] Key: [id number] STATUS: PENDING. Follow up done daily; if no response in three days we will refax for status check. If another three days goes by with no response we will call the insurance for status.

## 2023-09-17 DIAGNOSIS — F32.1 CURRENT MODERATE EPISODE OF MAJOR DEPRESSIVE DISORDER WITHOUT PRIOR EPISODE (HCC): ICD-10-CM

## 2023-09-18 RX ORDER — DULOXETIN HYDROCHLORIDE 30 MG/1
30 CAPSULE, DELAYED RELEASE ORAL
Qty: 30 CAPSULE | Refills: 2 | Status: SHIPPED | OUTPATIENT
Start: 2023-09-18 | End: 2023-12-17

## 2023-10-15 DIAGNOSIS — J30.9 ALLERGIC SINUSITIS: ICD-10-CM

## 2023-10-15 DIAGNOSIS — J30.1 NON-SEASONAL ALLERGIC RHINITIS DUE TO POLLEN: ICD-10-CM

## 2023-10-15 DIAGNOSIS — F32.1 CURRENT MODERATE EPISODE OF MAJOR DEPRESSIVE DISORDER WITHOUT PRIOR EPISODE (HCC): ICD-10-CM

## 2023-10-16 RX ORDER — DULOXETIN HYDROCHLORIDE 30 MG/1
30 CAPSULE, DELAYED RELEASE ORAL
Qty: 90 CAPSULE | Refills: 0 | Status: SHIPPED | OUTPATIENT
Start: 2023-10-16 | End: 2024-01-14

## 2023-10-16 RX ORDER — LISINOPRIL 20 MG/1
20 TABLET ORAL DAILY
Qty: 90 TABLET | Refills: 0 | Status: SHIPPED | OUTPATIENT
Start: 2023-10-16 | End: 2024-01-14

## 2023-10-16 RX ORDER — AZELASTINE 1 MG/ML
1 SPRAY, METERED NASAL 2 TIMES DAILY
Qty: 90 ML | Refills: 0 | Status: SHIPPED | OUTPATIENT
Start: 2023-10-16

## 2023-11-27 RX ORDER — METOPROLOL SUCCINATE 100 MG/1
100 TABLET, EXTENDED RELEASE ORAL DAILY
Qty: 90 TABLET | Refills: 0 | Status: SHIPPED | OUTPATIENT
Start: 2023-11-27 | End: 2024-02-25

## 2023-12-15 DIAGNOSIS — K22.10 ULCER OF ESOPHAGUS WITHOUT BLEEDING: ICD-10-CM

## 2023-12-15 RX ORDER — PANTOPRAZOLE SODIUM 40 MG/1
TABLET, DELAYED RELEASE ORAL
Qty: 180 TABLET | Refills: 0 | Status: SHIPPED | OUTPATIENT
Start: 2023-12-15

## 2024-02-14 DIAGNOSIS — J30.9 ALLERGIC SINUSITIS: ICD-10-CM

## 2024-02-14 DIAGNOSIS — J30.1 NON-SEASONAL ALLERGIC RHINITIS DUE TO POLLEN: ICD-10-CM

## 2024-02-14 DIAGNOSIS — J45.901 MODERATE ASTHMA WITH ACUTE EXACERBATION, UNSPECIFIED WHETHER PERSISTENT: ICD-10-CM

## 2024-02-14 RX ORDER — METOPROLOL SUCCINATE 100 MG/1
100 TABLET, EXTENDED RELEASE ORAL DAILY
Qty: 90 TABLET | Refills: 0 | Status: CANCELLED | OUTPATIENT
Start: 2024-02-14 | End: 2024-05-14

## 2024-02-14 RX ORDER — AZELASTINE 1 MG/ML
1 SPRAY, METERED NASAL 2 TIMES DAILY
Qty: 90 ML | Refills: 0 | Status: CANCELLED | OUTPATIENT
Start: 2024-02-14

## 2024-02-14 NOTE — TELEPHONE ENCOUNTER
From: Vaibhav Connell  To: Office of Dr. Clay Neville  Sent: 2/14/2024 11:00 AM EST  Subject: Medication Renewal Request    Refills have been requested for the following medications:     SYMBICORT 160-4.5 MCG/ACT AERO [Dr. Robin Sterling, DO]    Preferred pharmacy: [address], [place] [address] - P [phone] - F [phone]      Medication renewals requested in this message routed separately:     azelastine (ASTELIN) 0.1 % nasal spray [0]     metoprolol succinate (TOPROL XL) 100 MG extended release tablet [0]

## 2024-02-15 RX ORDER — AZELASTINE 1 MG/ML
1 SPRAY, METERED NASAL 2 TIMES DAILY
Qty: 90 ML | Refills: 0 | Status: SHIPPED | OUTPATIENT
Start: 2024-02-15

## 2024-02-15 RX ORDER — METOPROLOL SUCCINATE 100 MG/1
100 TABLET, EXTENDED RELEASE ORAL DAILY
Qty: 90 TABLET | Refills: 0 | Status: SHIPPED | OUTPATIENT
Start: 2024-02-15 | End: 2024-05-15

## 2024-02-15 RX ORDER — BUDESONIDE AND FORMOTEROL FUMARATE DIHYDRATE 160; 4.5 UG/1; UG/1
AEROSOL RESPIRATORY (INHALATION)
Qty: 33 G | Refills: 1 | Status: SHIPPED | OUTPATIENT
Start: 2024-02-15

## 2024-03-19 DIAGNOSIS — K22.10 ULCER OF ESOPHAGUS WITHOUT BLEEDING: ICD-10-CM

## 2024-03-19 RX ORDER — PANTOPRAZOLE SODIUM 40 MG/1
TABLET, DELAYED RELEASE ORAL
Qty: 180 TABLET | Refills: 0 | Status: SHIPPED | OUTPATIENT
Start: 2024-03-19

## 2024-03-20 DIAGNOSIS — F32.1 CURRENT MODERATE EPISODE OF MAJOR DEPRESSIVE DISORDER WITHOUT PRIOR EPISODE (HCC): ICD-10-CM

## 2024-03-21 RX ORDER — DULOXETIN HYDROCHLORIDE 30 MG/1
30 CAPSULE, DELAYED RELEASE ORAL
Qty: 90 CAPSULE | Refills: 0 | Status: SHIPPED | OUTPATIENT
Start: 2024-03-21 | End: 2024-06-19

## 2024-04-05 RX ORDER — LISINOPRIL 20 MG/1
20 TABLET ORAL DAILY
Qty: 90 TABLET | Refills: 0 | Status: SHIPPED | OUTPATIENT
Start: 2024-04-05 | End: 2024-07-04

## 2024-05-22 RX ORDER — METOPROLOL SUCCINATE 100 MG/1
100 TABLET, EXTENDED RELEASE ORAL DAILY
Qty: 90 TABLET | Refills: 0 | Status: SHIPPED | OUTPATIENT
Start: 2024-05-22 | End: 2024-08-20

## 2024-06-17 DIAGNOSIS — F32.1 CURRENT MODERATE EPISODE OF MAJOR DEPRESSIVE DISORDER WITHOUT PRIOR EPISODE (HCC): ICD-10-CM

## 2024-06-17 DIAGNOSIS — K22.10 ULCER OF ESOPHAGUS WITHOUT BLEEDING: ICD-10-CM

## 2024-06-17 RX ORDER — PANTOPRAZOLE SODIUM 40 MG/1
TABLET, DELAYED RELEASE ORAL
Qty: 180 TABLET | Refills: 0 | Status: SHIPPED | OUTPATIENT
Start: 2024-06-17

## 2024-06-17 RX ORDER — DULOXETIN HYDROCHLORIDE 30 MG/1
30 CAPSULE, DELAYED RELEASE ORAL
Qty: 90 CAPSULE | Refills: 0 | Status: SHIPPED | OUTPATIENT
Start: 2024-06-17 | End: 2024-09-15

## 2024-07-15 RX ORDER — LISINOPRIL 20 MG/1
20 TABLET ORAL DAILY
Qty: 90 TABLET | Refills: 0 | Status: SHIPPED | OUTPATIENT
Start: 2024-07-15 | End: 2024-10-13

## 2024-07-15 NOTE — TELEPHONE ENCOUNTER
LAST APPT 8/21/23  NO FOLLOW UP SCHEDULED      Return in about 8 days (around 8/29/2023), or 0930 FOR B/P CHECK.

## 2024-08-12 RX ORDER — METOPROLOL SUCCINATE 100 MG/1
100 TABLET, EXTENDED RELEASE ORAL DAILY
Qty: 90 TABLET | Refills: 0 | Status: SHIPPED | OUTPATIENT
Start: 2024-08-12 | End: 2024-11-10

## 2024-08-19 ENCOUNTER — E-VISIT (OUTPATIENT)
Age: 54
End: 2024-08-19
Payer: COMMERCIAL

## 2024-08-19 DIAGNOSIS — J01.90 ACUTE NON-RECURRENT SINUSITIS, UNSPECIFIED LOCATION: Primary | ICD-10-CM

## 2024-08-19 PROCEDURE — 99421 OL DIG E/M SVC 5-10 MIN: CPT | Performed by: NURSE PRACTITIONER

## 2024-08-19 ASSESSMENT — LIFESTYLE VARIABLES
SMOKING_STATUS: NO, I'M A FORMER SMOKER
SMOKING_YEARS: 30
PACKS_PER_DAY: 1

## 2024-08-19 ASSESSMENT — PATIENT HEALTH QUESTIONNAIRE - PHQ9
7. TROUBLE CONCENTRATING ON THINGS, SUCH AS READING THE NEWSPAPER OR WATCHING TELEVISION: NOT AT ALL
2. FEELING DOWN, DEPRESSED OR HOPELESS: NOT AT ALL
SUM OF ALL RESPONSES TO PHQ QUESTIONS 1-9: 1
5. POOR APPETITE OR OVEREATING: NOT AT ALL
6. FEELING BAD ABOUT YOURSELF - OR THAT YOU ARE A FAILURE OR HAVE LET YOURSELF OR YOUR FAMILY DOWN: NOT AT ALL
9. THOUGHTS THAT YOU WOULD BE BETTER OFF DEAD, OR OF HURTING YOURSELF: NOT AT ALL
SUM OF ALL RESPONSES TO PHQ QUESTIONS 1-9: 1
8. MOVING OR SPEAKING SO SLOWLY THAT OTHER PEOPLE COULD HAVE NOTICED. OR THE OPPOSITE - BEING SO FIDGETY OR RESTLESS THAT YOU HAVE BEEN MOVING AROUND A LOT MORE THAN USUAL: NOT AT ALL
3. TROUBLE FALLING OR STAYING ASLEEP: NOT AT ALL
SUM OF ALL RESPONSES TO PHQ9 QUESTIONS 1 & 2: 0
5. POOR APPETITE OR OVEREATING: NOT AT ALL
10. IF YOU CHECKED OFF ANY PROBLEMS, HOW DIFFICULT HAVE THESE PROBLEMS MADE IT FOR YOU TO DO YOUR WORK, TAKE CARE OF THINGS AT HOME, OR GET ALONG WITH OTHER PEOPLE: NOT DIFFICULT AT ALL
2. FEELING DOWN, DEPRESSED OR HOPELESS: NOT AT ALL
10. IF YOU CHECKED OFF ANY PROBLEMS, HOW DIFFICULT HAVE THESE PROBLEMS MADE IT FOR YOU TO DO YOUR WORK, TAKE CARE OF THINGS AT HOME, OR GET ALONG WITH OTHER PEOPLE: NOT DIFFICULT AT ALL
9. THOUGHTS THAT YOU WOULD BE BETTER OFF DEAD, OR OF HURTING YOURSELF: NOT AT ALL
3. TROUBLE FALLING OR STAYING ASLEEP: NOT AT ALL
6. FEELING BAD ABOUT YOURSELF - OR THAT YOU ARE A FAILURE OR HAVE LET YOURSELF OR YOUR FAMILY DOWN: NOT AT ALL
SUM OF ALL RESPONSES TO PHQ QUESTIONS 1-9: 1
1. LITTLE INTEREST OR PLEASURE IN DOING THINGS: NOT AT ALL
1. LITTLE INTEREST OR PLEASURE IN DOING THINGS: NOT AT ALL
SUM OF ALL RESPONSES TO PHQ QUESTIONS 1-9: 1
4. FEELING TIRED OR HAVING LITTLE ENERGY: SEVERAL DAYS
8. MOVING OR SPEAKING SO SLOWLY THAT OTHER PEOPLE COULD HAVE NOTICED. OR THE OPPOSITE, BEING SO FIGETY OR RESTLESS THAT YOU HAVE BEEN MOVING AROUND A LOT MORE THAN USUAL: NOT AT ALL
7. TROUBLE CONCENTRATING ON THINGS, SUCH AS READING THE NEWSPAPER OR WATCHING TELEVISION: NOT AT ALL
SUM OF ALL RESPONSES TO PHQ QUESTIONS 1-9: 1
4. FEELING TIRED OR HAVING LITTLE ENERGY: SEVERAL DAYS

## 2024-08-20 RX ORDER — AMOXICILLIN AND CLAVULANATE POTASSIUM 875; 125 MG/1; MG/1
1 TABLET, FILM COATED ORAL 2 TIMES DAILY
Qty: 14 TABLET | Refills: 0 | Status: SHIPPED | OUTPATIENT
Start: 2024-08-20 | End: 2024-08-27

## 2024-08-20 NOTE — PROGRESS NOTES
HPI: as per patient provided history  Exam: N/A (electronic visit)  ASSESSMENT/PLAN:  1. Acute non-recurrent sinusitis, unspecified location  Advised patient to continue OTC treatments for 4-5 more days, then   take antibiotic course through completion if not improved  Antihistamine of choice- Allegra, Zyrtec, Claritin  Mucinex may provide symptom relief  Sinus Flushing 2x day as able followed by nasal steroid inhalation as prescribed  Push fluids  Tylenol/Motrin for discomfort and fever  Sudafed 120mg twice daily if not hypertensive    - amoxicillin-clavulanate (AUGMENTIN) 875-125 MG per tablet; Take 1 tablet by mouth 2 times daily for 7 days  Dispense: 14 tablet; Refill: 0       Patient instructed to call the office if worsens, or fails to improve as anticipated.    5-10 minutes were spent on the digital evaluation and management of this patient.

## 2024-08-26 ENCOUNTER — OFFICE VISIT (OUTPATIENT)
Dept: FAMILY MEDICINE CLINIC | Age: 54
End: 2024-08-26
Payer: COMMERCIAL

## 2024-08-26 VITALS
BODY MASS INDEX: 30.72 KG/M2 | HEART RATE: 74 BPM | DIASTOLIC BLOOD PRESSURE: 80 MMHG | SYSTOLIC BLOOD PRESSURE: 138 MMHG | WEIGHT: 226.8 LBS | OXYGEN SATURATION: 96 % | HEIGHT: 72 IN

## 2024-08-26 DIAGNOSIS — I10 ESSENTIAL HYPERTENSION: Primary | Chronic | ICD-10-CM

## 2024-08-26 DIAGNOSIS — R07.89 ATYPICAL CHEST PAIN: ICD-10-CM

## 2024-08-26 DIAGNOSIS — Z82.49 FAMILY HISTORY OF EARLY CAD: ICD-10-CM

## 2024-08-26 DIAGNOSIS — E78.00 HYPERCHOLESTEROLEMIA: ICD-10-CM

## 2024-08-26 DIAGNOSIS — Z12.11 COLON CANCER SCREENING: ICD-10-CM

## 2024-08-26 DIAGNOSIS — Z13.1 DIABETES MELLITUS SCREENING: ICD-10-CM

## 2024-08-26 DIAGNOSIS — Z12.5 PROSTATE CANCER SCREENING: ICD-10-CM

## 2024-08-26 DIAGNOSIS — K22.10 ULCER OF ESOPHAGUS WITHOUT BLEEDING: ICD-10-CM

## 2024-08-26 LAB
CHOLEST SERPL-MCNC: 184 MG/DL (ref 0–199)
GLUCOSE P FAST SERPL-MCNC: 105 MG/DL (ref 70–99)
HDLC SERPL-MCNC: 35 MG/DL (ref 40–60)
LDL CHOLESTEROL: 120 MG/DL
PSA SERPL DL<=0.01 NG/ML-MCNC: 0.41 NG/ML (ref 0–4)
TRIGL SERPL-MCNC: 143 MG/DL (ref 0–150)
VLDLC SERPL CALC-MCNC: 29 MG/DL

## 2024-08-26 PROCEDURE — 36415 COLL VENOUS BLD VENIPUNCTURE: CPT | Performed by: NURSE PRACTITIONER

## 2024-08-26 PROCEDURE — G8417 CALC BMI ABV UP PARAM F/U: HCPCS | Performed by: NURSE PRACTITIONER

## 2024-08-26 PROCEDURE — 99214 OFFICE O/P EST MOD 30 MIN: CPT | Performed by: NURSE PRACTITIONER

## 2024-08-26 PROCEDURE — G8427 DOCREV CUR MEDS BY ELIG CLIN: HCPCS | Performed by: NURSE PRACTITIONER

## 2024-08-26 PROCEDURE — 3017F COLORECTAL CA SCREEN DOC REV: CPT | Performed by: NURSE PRACTITIONER

## 2024-08-26 PROCEDURE — 3075F SYST BP GE 130 - 139MM HG: CPT | Performed by: NURSE PRACTITIONER

## 2024-08-26 PROCEDURE — 3078F DIAST BP <80 MM HG: CPT | Performed by: NURSE PRACTITIONER

## 2024-08-26 PROCEDURE — 1036F TOBACCO NON-USER: CPT | Performed by: NURSE PRACTITIONER

## 2024-08-26 RX ORDER — PANTOPRAZOLE SODIUM 40 MG/1
TABLET, DELAYED RELEASE ORAL
Qty: 180 TABLET | Refills: 3 | Status: SHIPPED | OUTPATIENT
Start: 2024-08-26

## 2024-08-26 RX ORDER — LISINOPRIL 20 MG/1
20 TABLET ORAL DAILY
Qty: 90 TABLET | Refills: 3 | Status: SHIPPED | OUTPATIENT
Start: 2024-08-26 | End: 2024-11-24

## 2024-08-26 RX ORDER — METOPROLOL SUCCINATE 100 MG/1
100 TABLET, EXTENDED RELEASE ORAL DAILY
Qty: 90 TABLET | Refills: 3 | Status: SHIPPED | OUTPATIENT
Start: 2024-08-26 | End: 2024-11-24

## 2024-08-26 SDOH — ECONOMIC STABILITY: INCOME INSECURITY: HOW HARD IS IT FOR YOU TO PAY FOR THE VERY BASICS LIKE FOOD, HOUSING, MEDICAL CARE, AND HEATING?: NOT HARD AT ALL

## 2024-08-26 SDOH — ECONOMIC STABILITY: FOOD INSECURITY: WITHIN THE PAST 12 MONTHS, THE FOOD YOU BOUGHT JUST DIDN'T LAST AND YOU DIDN'T HAVE MONEY TO GET MORE.: NEVER TRUE

## 2024-08-26 SDOH — ECONOMIC STABILITY: INCOME INSECURITY: IN THE LAST 12 MONTHS, WAS THERE A TIME WHEN YOU WERE NOT ABLE TO PAY THE MORTGAGE OR RENT ON TIME?: NO

## 2024-08-26 SDOH — ECONOMIC STABILITY: FOOD INSECURITY: WITHIN THE PAST 12 MONTHS, YOU WORRIED THAT YOUR FOOD WOULD RUN OUT BEFORE YOU GOT MONEY TO BUY MORE.: NEVER TRUE

## 2024-08-26 NOTE — PROGRESS NOTES
.  Vaibhav Connell (:  1970) is a 53 y.o. male,Established patient, here for evaluation of the following chief complaint(s):    Hypertension (Routine follow up)      SUBJECTIVE/OBJECTIVE:  HPI    Routine HTN follow  Hypertension: Patient here for follow-up of elevated blood pressure. He is exercising and gets about 6-8000 steps daily is adherent to low salt diet.  He does not check his blood pressure  at home. Cardiac symptoms chest pain occurs randomly with and without activity  burning sensation  to the left of his breast bone takes protonix that has not helped with this can go months without it  does not last long no radiation quality Patient denies chest pain.  Cardiovascular risk factors: family history of premature cardiovascular disease, hypertension, and male gender. Use of agents associated with hypertension: none. History of target organ damage: none.   Review of Systems   Cardiovascular:  Positive for chest pain.     Physical Exam  Vitals reviewed.   Constitutional:       General: He is awake. He is not in acute distress.     Appearance: Normal appearance. He is well-developed and well-groomed. He is obese. He is not ill-appearing, toxic-appearing or diaphoretic.   Neurological:      Mental Status: He is alert and oriented to person, place, and time.   Psychiatric:         Attention and Perception: Attention and perception normal.         Mood and Affect: Mood and affect normal.         Speech: Speech normal.         Behavior: Behavior normal. Behavior is cooperative.         Thought Content: Thought content normal.         Cognition and Memory: Cognition and memory normal.         Judgment: Judgment normal.             Vaibhav was seen today for hypertension.    Diagnoses and all orders for this visit:    Essential hypertension  Stable on current medication  continue    metoprolol succinate (TOPROL XL) 100 MG extended release tablet; Take 1 tablet by mouth daily   lisinopril (PRINIVIL;ZESTRIL)  no

## 2024-08-26 NOTE — PATIENT INSTRUCTIONS

## 2024-09-16 DIAGNOSIS — F32.1 CURRENT MODERATE EPISODE OF MAJOR DEPRESSIVE DISORDER WITHOUT PRIOR EPISODE (HCC): ICD-10-CM

## 2024-09-16 RX ORDER — DULOXETIN HYDROCHLORIDE 30 MG/1
30 CAPSULE, DELAYED RELEASE ORAL
Qty: 90 CAPSULE | Refills: 1 | Status: SHIPPED | OUTPATIENT
Start: 2024-09-16 | End: 2025-03-15

## 2024-09-24 DIAGNOSIS — K22.10 ULCER OF ESOPHAGUS WITHOUT BLEEDING: ICD-10-CM

## 2024-09-24 RX ORDER — PANTOPRAZOLE SODIUM 40 MG/1
TABLET, DELAYED RELEASE ORAL
Qty: 180 TABLET | Refills: 3 | OUTPATIENT
Start: 2024-09-24

## 2024-10-10 DIAGNOSIS — I10 ESSENTIAL HYPERTENSION: Chronic | ICD-10-CM

## 2024-10-10 RX ORDER — LISINOPRIL 20 MG/1
20 TABLET ORAL DAILY
Qty: 90 TABLET | Refills: 3 | OUTPATIENT
Start: 2024-10-10 | End: 2025-01-08

## 2025-03-03 ENCOUNTER — OFFICE VISIT (OUTPATIENT)
Dept: FAMILY MEDICINE CLINIC | Age: 55
End: 2025-03-03
Payer: COMMERCIAL

## 2025-03-03 VITALS
DIASTOLIC BLOOD PRESSURE: 82 MMHG | SYSTOLIC BLOOD PRESSURE: 130 MMHG | BODY MASS INDEX: 30.02 KG/M2 | HEART RATE: 97 BPM | WEIGHT: 221.6 LBS | OXYGEN SATURATION: 96 % | HEIGHT: 72 IN

## 2025-03-03 DIAGNOSIS — Q04.8 CEREBELLAR TONSILLAR ECTOPIA (HCC): ICD-10-CM

## 2025-03-03 DIAGNOSIS — J32.9 SINUSITIS, UNSPECIFIED CHRONICITY, UNSPECIFIED LOCATION: Primary | ICD-10-CM

## 2025-03-03 DIAGNOSIS — J45.901 MODERATE ASTHMA WITH ACUTE EXACERBATION, UNSPECIFIED WHETHER PERSISTENT: ICD-10-CM

## 2025-03-03 PROCEDURE — 1036F TOBACCO NON-USER: CPT | Performed by: NURSE PRACTITIONER

## 2025-03-03 PROCEDURE — G8428 CUR MEDS NOT DOCUMENT: HCPCS | Performed by: NURSE PRACTITIONER

## 2025-03-03 PROCEDURE — 3017F COLORECTAL CA SCREEN DOC REV: CPT | Performed by: NURSE PRACTITIONER

## 2025-03-03 PROCEDURE — 3079F DIAST BP 80-89 MM HG: CPT | Performed by: NURSE PRACTITIONER

## 2025-03-03 PROCEDURE — G8417 CALC BMI ABV UP PARAM F/U: HCPCS | Performed by: NURSE PRACTITIONER

## 2025-03-03 PROCEDURE — 99214 OFFICE O/P EST MOD 30 MIN: CPT | Performed by: NURSE PRACTITIONER

## 2025-03-03 PROCEDURE — 3075F SYST BP GE 130 - 139MM HG: CPT | Performed by: NURSE PRACTITIONER

## 2025-03-03 RX ORDER — BUDESONIDE AND FORMOTEROL FUMARATE DIHYDRATE 160; 4.5 UG/1; UG/1
2 AEROSOL RESPIRATORY (INHALATION) 2 TIMES DAILY
Qty: 11 G | Refills: 0 | Status: SHIPPED | OUTPATIENT
Start: 2025-03-03

## 2025-03-03 NOTE — PATIENT INSTRUCTIONS
every 8 hours for adults. Appropriate doses at bedtime for children may help them sleep better. If pregnant take 1 -500 mg. (Tylenol) every 8 hours as needed. Ibuprofen may be used if not pregnant, but should be given with food to avoid nausea. Avoid Ibuprofen if you have high blood pressure, CHF, or kidney problems.   6.Gargle: Gargle in the back of the throat with the head tilted back and to the sides with a strong mouthwash ( Listerine or Scope) after meals and at bedtime at least 4 -5 times a day. This helps kill bacteria and viruses in the back of the throat and will shorten the duration and decrease the severity of your symptoms: sore throat, cough, ear popping,/ear pain, and possibly dizziness.   7. Smoking: Avoid smoking or exposure to second hand smoke.   8. Zinc: Zinc lozenges such as Cold Oskar, or Basic will help shorten the duration and lessen symptoms such as sore throat, cough, nasal congestion, runny nose, and post nasal drip. Use 1 lozenge every 2-4 hours ( after meals if stomach is sensitive). Children can use 10-15 mg. Or less 3-4 times a day or Zinc lollypops. In pregnancy limit to 50-60 mg. A day for 7 days as prenatals have Zinc also. With diarrhea use zinc pills 50 mg 1/2 to 1 pill 2x/day.   9. Vitamins: Vitamin C 500 mg. With breakfast and dinner. Children and pregnant women should drink citrus juices. This speeds healing and strengthens immune system.   10. Chest Symptoms: Vicks Vapor rub to the chest at bedtime.   11. Decongestants: Avoid all decongestants and antihistamine cold preparations in children.   Try all of the above starting with day 1 of symptoms. If Strep throat symptoms appear call to be seen in the office as soon as possible and don't gargle on that day. Newborns, infants, or anyone with earaches or influenza may need to be seen quickly. Adults with fevers over 103 degrees or shortness of breath should call the office immediately.   - doxycycline (ADOXA) 100 MG tablet; Take 1

## 2025-03-03 NOTE — PROGRESS NOTES
tenderness.      Left Sinus: No maxillary sinus tenderness or frontal sinus tenderness.   Cardiovascular:      Rate and Rhythm: Normal rate and regular rhythm.      Heart sounds: Normal heart sounds, S1 normal and S2 normal.   Pulmonary:      Effort: Pulmonary effort is normal.      Breath sounds: Normal breath sounds and air entry.   Neurological:      Mental Status: He is alert and oriented to person, place, and time.   Psychiatric:         Attention and Perception: Attention and perception normal.         Mood and Affect: Mood and affect normal.         Speech: Speech normal.         Behavior: Behavior normal. Behavior is cooperative.         Thought Content: Thought content normal.         Cognition and Memory: Cognition and memory normal.         Judgment: Judgment normal.          Physical Exam                  The patient (or guardian, if applicable) and other individuals in attendance with the patient were advised that Artificial Intelligence will be utilized during this visit to record, process the conversation to generate a clinical note, and support improvement of the AI technology. The patient (or guardian, if applicable) and other individuals in attendance at the appointment consented to the use of AI, including the recording.      This note was generated completely or in part utilizing Dragon dictation speech recognition software.  Occasionally, words are mistranscribed and despite editing, the text may contain inaccuracies due to incorrect word recognition.  If further clarification is needed please contact the office at [phone].    An electronic signature was used to authenticate this note.    --ÓSCAR Valenzuela - CNP

## 2025-03-12 ENCOUNTER — TELEPHONE (OUTPATIENT)
Dept: FAMILY MEDICINE CLINIC | Age: 55
End: 2025-03-12

## 2025-03-12 DIAGNOSIS — J32.9 SINUSITIS, UNSPECIFIED CHRONICITY, UNSPECIFIED LOCATION: Primary | ICD-10-CM

## 2025-03-12 RX ORDER — LEVOFLOXACIN 500 MG/1
500 TABLET, FILM COATED ORAL DAILY
Qty: 5 TABLET | Refills: 0 | Status: SHIPPED | OUTPATIENT
Start: 2025-03-12 | End: 2025-03-17

## 2025-03-12 NOTE — TELEPHONE ENCOUNTER
Pt was seen last Monday for a sinus infection.URI.  He was on amoxicillin/clav for a 7 day regiment.  He said this helped a little bit but he is not any better.    Can he get something stronger?    He is still having itchy watery dry eyes, lots of mucous, non stop sneezing all day. No cough no fever.    Pressure around his nose and eyes--never let up.  Like seasonal allergies on steroids.    Sulema  [phone]

## 2025-03-13 NOTE — TELEPHONE ENCOUNTER
Teetee was sent to the pharmacy he is on take 1 tablet every day for 5 days if no improvement noted at-I will refer him to ENT

## 2025-08-19 ENCOUNTER — OFFICE VISIT (OUTPATIENT)
Dept: FAMILY MEDICINE CLINIC | Age: 55
End: 2025-08-19
Payer: COMMERCIAL

## 2025-08-19 VITALS
SYSTOLIC BLOOD PRESSURE: 138 MMHG | OXYGEN SATURATION: 98 % | WEIGHT: 234 LBS | DIASTOLIC BLOOD PRESSURE: 88 MMHG | HEART RATE: 88 BPM | BODY MASS INDEX: 31.69 KG/M2 | HEIGHT: 72 IN

## 2025-08-19 DIAGNOSIS — Z00.00 ANNUAL PHYSICAL EXAM: ICD-10-CM

## 2025-08-19 DIAGNOSIS — Z87.891 PERSONAL HISTORY OF TOBACCO USE: ICD-10-CM

## 2025-08-19 DIAGNOSIS — G89.29 CHRONIC LEFT SHOULDER PAIN: ICD-10-CM

## 2025-08-19 DIAGNOSIS — I10 ESSENTIAL HYPERTENSION: Primary | Chronic | ICD-10-CM

## 2025-08-19 DIAGNOSIS — M25.512 LEFT SHOULDER PAIN, UNSPECIFIED CHRONICITY: ICD-10-CM

## 2025-08-19 DIAGNOSIS — K22.10 ULCER OF ESOPHAGUS WITHOUT BLEEDING: ICD-10-CM

## 2025-08-19 DIAGNOSIS — Z12.5 PROSTATE CANCER SCREENING: ICD-10-CM

## 2025-08-19 DIAGNOSIS — E78.00 HYPERCHOLESTEROLEMIA: ICD-10-CM

## 2025-08-19 DIAGNOSIS — M25.512 CHRONIC LEFT SHOULDER PAIN: ICD-10-CM

## 2025-08-19 LAB
ALBUMIN SERPL-MCNC: 4.9 G/DL (ref 3.4–5)
ALBUMIN/GLOB SERPL: 2 {RATIO} (ref 1.1–2.2)
ALP SERPL-CCNC: 77 U/L (ref 40–129)
ALT SERPL-CCNC: 40 U/L (ref 10–40)
ANION GAP SERPL CALCULATED.3IONS-SCNC: 16 MMOL/L (ref 3–16)
AST SERPL-CCNC: 24 U/L (ref 15–37)
BILIRUB SERPL-MCNC: 0.4 MG/DL (ref 0–1)
BUN SERPL-MCNC: 15 MG/DL (ref 7–20)
CALCIUM SERPL-MCNC: 9.8 MG/DL (ref 8.3–10.6)
CHLORIDE SERPL-SCNC: 101 MMOL/L (ref 99–110)
CHOLEST SERPL-MCNC: 211 MG/DL (ref 0–199)
CO2 SERPL-SCNC: 21 MMOL/L (ref 21–32)
CREAT SERPL-MCNC: 1.1 MG/DL (ref 0.9–1.3)
GFR SERPLBLD CREATININE-BSD FMLA CKD-EPI: 80 ML/MIN/{1.73_M2}
GLUCOSE SERPL-MCNC: 89 MG/DL (ref 70–99)
HDLC SERPL-MCNC: 36 MG/DL (ref 40–60)
LDL CHOLESTEROL: ABNORMAL MG/DL
LDLC SERPL-MCNC: 130 MG/DL
POTASSIUM SERPL-SCNC: 4.4 MMOL/L (ref 3.5–5.1)
PROT SERPL-MCNC: 7.3 G/DL (ref 6.4–8.2)
PSA SERPL DL<=0.01 NG/ML-MCNC: 0.43 NG/ML (ref 0–4)
SODIUM SERPL-SCNC: 138 MMOL/L (ref 136–145)
TRIGL SERPL-MCNC: 304 MG/DL (ref 0–150)
VLDLC SERPL CALC-MCNC: ABNORMAL MG/DL

## 2025-08-19 PROCEDURE — 1036F TOBACCO NON-USER: CPT | Performed by: NURSE PRACTITIONER

## 2025-08-19 PROCEDURE — G8417 CALC BMI ABV UP PARAM F/U: HCPCS | Performed by: NURSE PRACTITIONER

## 2025-08-19 PROCEDURE — 99396 PREV VISIT EST AGE 40-64: CPT | Performed by: NURSE PRACTITIONER

## 2025-08-19 PROCEDURE — 3017F COLORECTAL CA SCREEN DOC REV: CPT | Performed by: NURSE PRACTITIONER

## 2025-08-19 PROCEDURE — 3075F SYST BP GE 130 - 139MM HG: CPT | Performed by: NURSE PRACTITIONER

## 2025-08-19 PROCEDURE — 3079F DIAST BP 80-89 MM HG: CPT | Performed by: NURSE PRACTITIONER

## 2025-08-19 PROCEDURE — G8427 DOCREV CUR MEDS BY ELIG CLIN: HCPCS | Performed by: NURSE PRACTITIONER

## 2025-08-19 PROCEDURE — 99214 OFFICE O/P EST MOD 30 MIN: CPT | Performed by: NURSE PRACTITIONER

## 2025-08-19 PROCEDURE — 20610 DRAIN/INJ JOINT/BURSA W/O US: CPT | Performed by: NURSE PRACTITIONER

## 2025-08-19 PROCEDURE — G0296 VISIT TO DETERM LDCT ELIG: HCPCS | Performed by: NURSE PRACTITIONER

## 2025-08-19 RX ORDER — METOPROLOL SUCCINATE 100 MG/1
100 TABLET, EXTENDED RELEASE ORAL DAILY
Qty: 90 TABLET | Refills: 3 | Status: SHIPPED | OUTPATIENT
Start: 2025-11-24 | End: 2026-02-22

## 2025-08-19 RX ORDER — LISINOPRIL 20 MG/1
20 TABLET ORAL DAILY
Qty: 90 TABLET | Refills: 3 | Status: SHIPPED | OUTPATIENT
Start: 2025-11-24 | End: 2026-02-22

## 2025-08-19 RX ORDER — METHYLPREDNISOLONE ACETATE 80 MG/ML
80 INJECTION, SUSPENSION INTRA-ARTICULAR; INTRALESIONAL; INTRAMUSCULAR; SOFT TISSUE ONCE
Status: COMPLETED | OUTPATIENT
Start: 2025-08-19 | End: 2025-08-19

## 2025-08-19 RX ORDER — BUPIVACAINE HYDROCHLORIDE 5 MG/ML
1 INJECTION, SOLUTION EPIDURAL; INTRACAUDAL; PERINEURAL ONCE
Status: COMPLETED | OUTPATIENT
Start: 2025-08-19 | End: 2025-08-19

## 2025-08-19 RX ORDER — PANTOPRAZOLE SODIUM 40 MG/1
TABLET, DELAYED RELEASE ORAL
Qty: 180 TABLET | Refills: 3 | Status: SHIPPED | OUTPATIENT
Start: 2025-08-19

## 2025-08-19 RX ADMIN — METHYLPREDNISOLONE ACETATE 80 MG: 80 INJECTION, SUSPENSION INTRA-ARTICULAR; INTRALESIONAL; INTRAMUSCULAR; SOFT TISSUE at 15:27

## 2025-08-19 RX ADMIN — BUPIVACAINE HYDROCHLORIDE 1 ML: 5 INJECTION, SOLUTION EPIDURAL; INTRACAUDAL; PERINEURAL at 15:24

## 2025-08-19 SDOH — ECONOMIC STABILITY: FOOD INSECURITY: WITHIN THE PAST 12 MONTHS, THE FOOD YOU BOUGHT JUST DIDN'T LAST AND YOU DIDN'T HAVE MONEY TO GET MORE.: NEVER TRUE

## 2025-08-19 SDOH — ECONOMIC STABILITY: FOOD INSECURITY: WITHIN THE PAST 12 MONTHS, YOU WORRIED THAT YOUR FOOD WOULD RUN OUT BEFORE YOU GOT MONEY TO BUY MORE.: NEVER TRUE

## 2025-08-19 ASSESSMENT — PATIENT HEALTH QUESTIONNAIRE - PHQ9
2. FEELING DOWN, DEPRESSED OR HOPELESS: NOT AT ALL
SUM OF ALL RESPONSES TO PHQ QUESTIONS 1-9: 0
1. LITTLE INTEREST OR PLEASURE IN DOING THINGS: NOT AT ALL
SUM OF ALL RESPONSES TO PHQ QUESTIONS 1-9: 0

## 2025-08-19 ASSESSMENT — PAIN DESCRIPTION - LOCATION: LOCATION: SHOULDER

## 2025-08-19 ASSESSMENT — PAIN DESCRIPTION - DESCRIPTORS: DESCRIPTORS: ACHING;DISCOMFORT

## 2025-08-19 ASSESSMENT — PAIN DESCRIPTION - ORIENTATION: ORIENTATION: LEFT

## 2025-08-19 ASSESSMENT — PAIN - FUNCTIONAL ASSESSMENT: PAIN_FUNCTIONAL_ASSESSMENT: 0-10

## 2025-08-19 ASSESSMENT — PAIN SCALES - GENERAL: PAINLEVEL_OUTOF10: 7

## (undated) DEVICE — NEEDLE 25GAX5.0MM INJ CARR LOCKE

## (undated) DEVICE — BITE BLK 60FR GRN ENDOSCP AD W STRP SLD DISPOSABLE

## (undated) DEVICE — ENDOSCOPY KIT: Brand: MEDLINE INDUSTRIES, INC.

## (undated) DEVICE — FORCEPS BX 240CM 2.4MM L NDL RAD JAW 4 M00513334